# Patient Record
Sex: MALE | Race: BLACK OR AFRICAN AMERICAN | Employment: UNEMPLOYED | URBAN - METROPOLITAN AREA
[De-identification: names, ages, dates, MRNs, and addresses within clinical notes are randomized per-mention and may not be internally consistent; named-entity substitution may affect disease eponyms.]

---

## 2018-04-16 ENCOUNTER — HOSPITAL ENCOUNTER (EMERGENCY)
Age: 33
Discharge: HOME | End: 2018-04-16
Attending: EMERGENCY MEDICINE
Payer: COMMERCIAL

## 2018-04-16 VITALS
TEMPERATURE: 99 F | BODY MASS INDEX: 40.92 KG/M2 | RESPIRATION RATE: 18 BRPM | HEART RATE: 87 BPM | OXYGEN SATURATION: 99 % | HEIGHT: 68 IN | WEIGHT: 270 LBS

## 2018-04-16 DIAGNOSIS — R07.89 ATYPICAL CHEST PAIN: ICD-10-CM

## 2018-04-16 DIAGNOSIS — M54.50 ACUTE BILATERAL LOW BACK PAIN WITHOUT SCIATICA: Primary | ICD-10-CM
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
## 2018-04-16 PROCEDURE — 74011250637 HC RX REV CODE- 250/637: Performed by: PHYSICIAN ASSISTANT

## 2018-04-16 PROCEDURE — 99282 EMERGENCY DEPT VISIT SF MDM: CPT

## 2018-04-16 RX ORDER — METHOCARBAMOL 500 MG/1
1000 TABLET, FILM COATED ORAL ONCE
Status: COMPLETED | OUTPATIENT
Start: 2018-04-16 | End: 2018-04-16

## 2018-04-16 RX ORDER — METHOCARBAMOL 500 MG/1
1000 TABLET, FILM COATED ORAL 4 TIMES DAILY
Status: DISCONTINUED | OUTPATIENT
Start: 2018-04-16 | End: 2018-04-16

## 2018-04-16 RX ORDER — HYDROCODONE BITARTRATE AND ACETAMINOPHEN 5; 325 MG/1; MG/1
1 TABLET ORAL
Status: COMPLETED | OUTPATIENT
Start: 2018-04-16 | End: 2018-04-16

## 2018-04-16 RX ORDER — NAPROXEN 500 MG/1
500 TABLET ORAL 2 TIMES DAILY WITH MEALS
Qty: 20 TAB | Refills: 0 | Status: SHIPPED | OUTPATIENT
Start: 2018-04-16 | End: 2018-04-26

## 2018-04-16 RX ORDER — METAXALONE 800 MG/1
800 TABLET ORAL 4 TIMES DAILY
Qty: 20 TAB | Refills: 0 | Status: SHIPPED | OUTPATIENT
Start: 2018-04-16 | End: 2018-04-21

## 2018-04-16 RX ADMIN — HYDROCODONE BITARTRATE AND ACETAMINOPHEN 1 TABLET: 5; 325 TABLET ORAL at 10:20

## 2018-04-16 RX ADMIN — METHOCARBAMOL 1000 MG: 500 TABLET ORAL at 10:20
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
## 2018-04-16 NOTE — LETTER
[address] [place] [address] [facility] 
[address] [facility] [address] 
[phone] Work/School Note Date: 4/16/2018 To Whom It May concern: 
 
Nick Arce was seen and treated today in the emergency room by the following provider(s): 
Attending Provider: Fariha Royal MD 
Physician Assistant: Rohan Pool, [address] [place] [address] may return to work on 4/19/18 Sincerely, Rohan Pool [facility]
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
## 2018-04-18 ENCOUNTER — HOSPITAL ENCOUNTER (EMERGENCY)
Age: 33
Discharge: HOME | End: 2018-04-18
Attending: EMERGENCY MEDICINE
Payer: MEDICAID

## 2018-04-18 ENCOUNTER — APPOINTMENT (OUTPATIENT)
Dept: GENERAL RADIOLOGY | Age: 33
End: 2018-04-18
Attending: PHYSICIAN ASSISTANT
Payer: MEDICAID

## 2018-04-18 VITALS
RESPIRATION RATE: 18 BRPM | HEIGHT: 68 IN | SYSTOLIC BLOOD PRESSURE: 152 MMHG | WEIGHT: 270 LBS | BODY MASS INDEX: 40.92 KG/M2 | HEART RATE: 101 BPM | TEMPERATURE: 98.5 F | DIASTOLIC BLOOD PRESSURE: 90 MMHG | OXYGEN SATURATION: 99 %

## 2018-04-18 DIAGNOSIS — S39.012D LUMBAR STRAIN, SUBSEQUENT ENCOUNTER: Primary | ICD-10-CM

## 2018-04-18 PROCEDURE — 99282 EMERGENCY DEPT VISIT SF MDM: CPT

## 2018-04-18 PROCEDURE — 72100 X-RAY EXAM L-S SPINE 2/3 VWS: CPT
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
## 2018-04-19 NOTE — DISCHARGE INSTRUCTIONS
Back Strain: Care Instructions  Your Care Instructions    Back strain happens when you overstretch, or pull, a muscle in your back. You may hurt your back in an accident or when you exercise or lift something. Most back pain will get better with rest and time. You can take care of yourself at home to help your back heal.  Follow-up care is a key part of your treatment and safety. Be sure to make and go to all appointments, and call your doctor if you are having problems. It's also a good idea to know your test results and keep a list of the medicines you take. How can you care for yourself at home? · Try to stay as active as you can, but stop or reduce any activity that causes pain. · Put ice or a cold pack on the sore muscle for 10 to 20 minutes at a time to stop swelling. Try this every 1 to 2 hours for 3 days (when you are awake) or until the swelling goes down. Put a thin cloth between the ice pack and your skin. · After 2 or 3 days, apply a heating pad on low or a warm cloth to your back. Some doctors suggest that you go back and forth between hot and cold treatments. · Take pain medicines exactly as directed. ¨ If the doctor gave you a prescription medicine for pain, take it as prescribed. ¨ If you are not taking a prescription pain medicine, ask your doctor if you can take an over-the-counter medicine. · Try sleeping on your side with a pillow between your legs. Or put a pillow under your knees when you lie on your back. These measures can ease pain in your lower back. · Return to your usual level of activity slowly. When should you call for help? Call 911 anytime you think you may need emergency care. For example, call if:  ? · You are unable to move a leg at all. ?Call your doctor now or seek immediate medical care if:  ? · You have new or worse symptoms in your legs, belly, or buttocks. Symptoms may include:  ¨ Numbness or tingling. ¨ Weakness. ¨ Pain.    ? · You lose bladder or bowel control. ? Watch closely for changes in your health, and be sure to contact your doctor if you are not getting better as expected. Where can you learn more? Go to http://lisa-tabitha.info/. Enter Q560 in the search box to learn more about \"Back Strain: Care Instructions. \"  Current as of: March 21, 2017  Content Version: 11.4  © 2281-6282 [employer]. Care instructions adapted under license by [employer] (which disclaims liability or warranty for this information). If you have questions about a medical condition or this instruction, always ask your healthcare professional. [place] [address] any warranty or liability for your use of this information.
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
## 2018-04-20 ENCOUNTER — HOSPITAL ENCOUNTER (EMERGENCY)
Age: 33
Discharge: HOME | End: 2018-04-20
Attending: EMERGENCY MEDICINE
Payer: MEDICAID

## 2018-04-20 VITALS
DIASTOLIC BLOOD PRESSURE: 74 MMHG | WEIGHT: 280 LBS | TEMPERATURE: 98.5 F | SYSTOLIC BLOOD PRESSURE: 143 MMHG | BODY MASS INDEX: 42.44 KG/M2 | HEIGHT: 68 IN | OXYGEN SATURATION: 100 % | RESPIRATION RATE: 18 BRPM | HEART RATE: 105 BPM
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
## 2018-04-20 DIAGNOSIS — M54.50 ACUTE MIDLINE LOW BACK PAIN WITHOUT SCIATICA: ICD-10-CM

## 2018-04-20 PROCEDURE — 99282 EMERGENCY DEPT VISIT SF MDM: CPT

## 2018-04-20 RX ORDER — HYDROCODONE BITARTRATE AND ACETAMINOPHEN 5; 325 MG/1; MG/1
1 TABLET ORAL
Qty: 10 TAB | Refills: 0 | Status: SHIPPED | OUTPATIENT
Start: 2018-04-20 | End: 2018-06-05 | Stop reason: ALTCHOICE

## 2018-04-20 RX ORDER — LIDOCAINE 50 MG/G
PATCH TOPICAL
Qty: 15 EACH | Refills: 0 | Status: SHIPPED | OUTPATIENT
Start: 2018-04-20 | End: 2018-07-10

## 2018-04-20 NOTE — LETTER
NOTIFICATION OF RETURN TO WORK / SCHOOL 
 
4/20/2018 Mr. Lexy Delacruz [address] [facility] [address] [facility] [address] To Whom it may concern, Please excuse Lexy Delacruz from work 04/20/18 for medical reasons.    
 
 
Sincerely,  
 
 
 
 
Derrell Penn PA-C
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
## 2018-04-20 NOTE — DISCHARGE INSTRUCTIONS
Take anti-inflammatories such as tylenol or motrin with food for pain relief. Do not drive while taking narcotics or muscle relaxants. Massage the muscles that are tight and apply heating pads. Ice can help reduce swelling and inflammation. Apply in 20 minute intervals throughout the day. Return to ED for any significant worsening of pain, or neurologic changes such as loss of bowel or bladder control, weakness or numbness in the extremities. If this is a chronic issue, you may want to consider seeing a specialist or pain management physician. Back Stretches: Exercises  Your Care Instructions  Here are some examples of exercises for stretching your back. Start each exercise slowly. Ease off the exercise if you start to have pain. Your doctor or physical therapist will tell you when you can start these exercises and which ones will work best for you. How to do the exercises  Overhead stretch    1. Stand comfortably with your feet shoulder-width apart. 2. Looking straight ahead, raise both arms over your head and reach toward the ceiling. Do not allow your head to tilt back. 3. Hold for 15 to 30 seconds, then lower your arms to your sides. 4. Repeat 2 to 4 times. Side stretch    1. Stand comfortably with your feet shoulder-width apart. 2. Raise one arm over your head, and then lean to the other side. 3. Slide your hand down your leg as you let the weight of your arm gently stretch your side muscles. Hold for 15 to 30 seconds. 4. Repeat 2 to 4 times on each side. Press-up    1. Lie on your stomach, supporting your body with your forearms. 2. Press your elbows down into the floor to raise your upper back. As you do this, relax your stomach muscles and allow your back to arch without using your back muscles. As your press up, do not let your hips or pelvis come off the floor. 3. Hold for 15 to 30 seconds, then relax. 4. Repeat 2 to 4 times. Relax and rest    1.  Lie on your back with a rolled towel under your neck and a pillow under your knees. Extend your arms comfortably to your sides. 2. Relax and breathe normally. 3. Remain in this position for about 10 minutes. 4. If you can, do this 2 or 3 times each day. Follow-up care is a key part of your treatment and safety. Be sure to make and go to all appointments, and call your doctor if you are having problems. It's also a good idea to know your test results and keep a list of the medicines you take. Where can you learn more? Go to http://lisaAfrica Interactivetabitha.info/. Enter O535 in the search box to learn more about \"Back Stretches: Exercises. \"  Current as of: March 21, 2017  Content Version: 11.4  © 0656-0780 [employer]. Care instructions adapted under license by [employer] (which disclaims liability or warranty for this information). If you have questions about a medical condition or this instruction, always ask your healthcare professional. [place] [address] any warranty or liability for your use of this information. Learning About Relief for Back Pain  What is back tension and strain? Back strain happens when you overstretch, or pull, a muscle in your back. You may hurt your back in an accident or when you exercise or lift something. Most back pain will get better with rest and time. You can take care of yourself at home to help your back heal.  What can you do first to relieve back pain? When you first feel back pain, try these steps:  · Walk. Take a short walk (10 to 20 minutes) on a level surface (no slopes, hills, or stairs) every 2 to 3 hours. Walk only distances you can manage without pain, especially leg pain. · Relax. Find a comfortable position for rest. Some people are comfortable on the floor or a medium-firm bed with a small pillow under their head and another under their knees. Some people prefer to lie on their side with a pillow between their knees.  Don't stay in one position for too long. · Try heat or ice. Try using a heating pad on a low or medium setting, or take a warm shower, for 15 to 20 minutes every 2 to 3 hours. Or you can buy single-use heat wraps that last up to 8 hours. You can also try an ice pack for 10 to 15 minutes every 2 to 3 hours. You can use an ice pack or a bag of frozen vegetables wrapped in a thin towel. There is not strong evidence that either heat or ice will help, but you can try them to see if they help. You may also want to try switching between heat and cold. · Take pain medicine exactly as directed. ¨ If the doctor gave you a prescription medicine for pain, take it as prescribed. ¨ If you are not taking a prescription pain medicine, ask your doctor if you can take an over-the-counter medicine. What else can you do? · Stretch and exercise. Exercises that increase flexibility may relieve your pain and make it easier for your muscles to keep your spine in a good, neutral position. And don't forget to keep walking. · Do self-massage. You can use self-massage to unwind after work or school or to energize yourself in the morning. You can easily massage your feet, hands, or neck. Self-massage works best if you are in comfortable clothes and are sitting or lying in a comfortable position. Use oil or lotion to massage bare skin. · Reduce stress. Back pain can lead to a vicious Wiyot: Distress about the pain tenses the muscles in your back, which in turn causes more pain. Learn how to relax your mind and your muscles to lower your stress. Where can you learn more? Go to http://lisa-tabitha.info/. Enter E934 in the search box to learn more about \"Learning About Relief for Back Pain. \"  Current as of: March 21, 2017  Content Version: 11.4  © 1253-9717 [employer] [facility]. Care instructions adapted under license by [employer] (which disclaims liability or warranty for this information).  If you have questions about a medical condition or this instruction, always ask your healthcare professional. [place] [address] any warranty or liability for your use of this information.

## 2018-04-20 NOTE — ED NOTES
Danny Felton is a 34 y.o. male that was discharged in stable condition. The patients diagnosis, condition and treatment were explained to  patient and aftercare instructions were given. The patient verbalized understanding. Patient armband removed and shredded.
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
## 2018-05-14 ENCOUNTER — TELEPHONE (OUTPATIENT)
Dept: INTERNAL MEDICINE CLINIC | Age: 33
End: 2018-05-14

## 2018-05-14 NOTE — TELEPHONE ENCOUNTER
Patient wife andrei calling wanting to know if [employer] will do a prior auth for the patients Ethanuvia. Pt has appt to see JM on 5/21.     pls advise

## 2018-05-20 PROBLEM — M47.816 LUMBAR FACET ARTHROPATHY: Status: ACTIVE | Noted: 2018-05-20

## 2018-05-20 PROBLEM — E66.01 OBESITY, MORBID, BMI 40.0-49.9 (HCC): Status: ACTIVE | Noted: 2018-05-20

## 2018-06-05 ENCOUNTER — OFFICE VISIT (OUTPATIENT)
Dept: INTERNAL MEDICINE CLINIC | Age: 33
End: 2018-06-05

## 2018-06-05 ENCOUNTER — HOSPITAL ENCOUNTER (OUTPATIENT)
Dept: LAB | Age: 33
Discharge: HOME | End: 2018-06-05

## 2018-06-05 VITALS
SYSTOLIC BLOOD PRESSURE: 118 MMHG | OXYGEN SATURATION: 99 % | WEIGHT: 259.9 LBS | DIASTOLIC BLOOD PRESSURE: 88 MMHG | RESPIRATION RATE: 15 BRPM | HEIGHT: 68 IN | HEART RATE: 98 BPM | TEMPERATURE: 98.5 F | BODY MASS INDEX: 39.39 KG/M2

## 2018-06-05 DIAGNOSIS — R06.00 DYSPNEA, UNSPECIFIED TYPE: ICD-10-CM

## 2018-06-05 DIAGNOSIS — E66.01 OBESITY, MORBID, BMI 40.0-49.9 (HCC): ICD-10-CM

## 2018-06-05 DIAGNOSIS — F32.1 CURRENT MODERATE EPISODE OF MAJOR DEPRESSIVE DISORDER WITHOUT PRIOR EPISODE (HCC): ICD-10-CM

## 2018-06-05 DIAGNOSIS — Z76.89 ESTABLISHING CARE WITH NEW DOCTOR, ENCOUNTER FOR: Primary | ICD-10-CM

## 2018-06-05 DIAGNOSIS — E11.21 TYPE II DIABETES MELLITUS WITH NEPHROPATHY (HCC): ICD-10-CM

## 2018-06-05 DIAGNOSIS — G54.6 PHANTOM PAIN AFTER AMPUTATION OF LOWER EXTREMITY (HCC): ICD-10-CM

## 2018-06-05 PROCEDURE — 99001 SPECIMEN HANDLING PT-LAB: CPT | Performed by: NURSE PRACTITIONER

## 2018-06-05 RX ORDER — GABAPENTIN 300 MG/1
300 CAPSULE ORAL 3 TIMES DAILY
COMMUNITY
Start: 2018-05-23 | End: 2018-07-10 | Stop reason: ALTCHOICE

## 2018-06-05 RX ORDER — METOPROLOL TARTRATE 50 MG/1
50 TABLET ORAL 2 TIMES DAILY
COMMUNITY
Start: 2018-05-13 | End: 2018-06-19 | Stop reason: SDUPTHER

## 2018-06-05 RX ORDER — INSULIN LISPRO 100 [IU]/ML
2-10 INJECTION, SOLUTION INTRAVENOUS; SUBCUTANEOUS
COMMUNITY
Start: 2018-05-13 | End: 2018-06-05

## 2018-06-05 RX ORDER — INSULIN GLARGINE 100 [IU]/ML
30 INJECTION, SOLUTION SUBCUTANEOUS
COMMUNITY
Start: 2018-05-13 | End: 2018-07-10

## 2018-06-05 RX ORDER — LOSARTAN POTASSIUM 50 MG/1
50 TABLET ORAL DAILY
Qty: 90 TAB | Refills: 3 | Status: SHIPPED | OUTPATIENT
Start: 2018-06-05 | End: 2018-06-19 | Stop reason: SDUPTHER

## 2018-06-05 RX ORDER — OXYCODONE AND ACETAMINOPHEN 10; 325 MG/1; MG/1
TABLET ORAL
COMMUNITY
Start: 2018-05-23 | End: 2018-07-10

## 2018-06-05 RX ORDER — CLINDAMYCIN HYDROCHLORIDE 300 MG/1
300 CAPSULE ORAL 3 TIMES DAILY
COMMUNITY
Start: 2018-05-16 | End: 2018-06-05 | Stop reason: ALTCHOICE

## 2018-06-05 RX ORDER — LOSARTAN POTASSIUM 50 MG/1
50 TABLET ORAL DAILY
COMMUNITY
Start: 2018-05-14 | End: 2018-06-05 | Stop reason: SDUPTHER

## 2018-06-05 RX ORDER — ATORVASTATIN CALCIUM 20 MG/1
20 TABLET, FILM COATED ORAL
COMMUNITY
Start: 2018-05-13 | End: 2018-06-19 | Stop reason: SDUPTHER

## 2018-06-05 RX ORDER — PEN NEEDLE, DIABETIC 31 GX3/16"
1 NEEDLE, DISPOSABLE MISCELLANEOUS
COMMUNITY
Start: 2018-05-13 | End: 2018-07-10 | Stop reason: ALTCHOICE

## 2018-06-05 NOTE — MR AVS SNAPSHOT
Becky Barrman 
 
 
 [address], [address] [facility] 
[phone] Patient: Mike Eden MRN: [id number] Visit Information Date & Time Provider Department Dept. Phone Encounter #  
 6/5/2018 12:45 PM Kedar Knutson NP [facility] [phone] Your Appointments 6/19/2018  9:30 AM  
Office Visit with Kedar Knutson NP [facility] ([address]) Appt Note: 2 week f/u  
 [address] [facility], [address] [facility]  
  
   
 [address], [address]  
  
    
 6/19/2018 10:00 AM  
Office Visit with Mo Tubbs [facility] ([address]) Appt Note: 2 week f/u  
 [address] [facility], [address] [facility] [address] [place]  
  
   
 [address], [address] Upcoming Health Maintenance Date Due DTaP/Tdap/Td series (1 - Tdap) 8/31/2005 Influenza Age 5 to Adult 8/1/2018 Allergies as of 6/5/2018  Review Complete On: 6/5/2018 By: Ruba Diaz LPN No Known Allergies Current Immunizations  Never Reviewed No immunizations on file. Not reviewed this visit You Were Diagnosed With   
  
 Codes Comments Establishing care with new doctor, encounter for    -  Primary ICD-10-CM: Z76.89 
ICD-9-CM: V65.8 Obesity, morbid, BMI 40.0-49.9 (HCC)     ICD-10-CM: E66.01 
ICD-9-CM: 278.01 Dyspnea, unspecified type     ICD-10-CM: R06.00 
ICD-9-CM: 786.09 Type II diabetes mellitus with nephropathy (HCC)     ICD-10-CM: E11.21 
ICD-9-CM: 250.40, 583.81 Phantom pain after amputation of lower extremity ([facility] [address]     ICD-10-CM: G54.6 ICD-9-CM: 353.6 Current moderate episode of major depressive disorder without prior episode ([facility] [address]     ICD-10-CM: F32.1 ICD-9-CM: 296.22 Vitals BP Pulse Temp Resp Height(growth percentile) Weight(growth percentile) 118/88 (BP 1 Location: Right arm, BP Patient Position: Sitting) 98 98.5 °F (36.9 °C) (Oral) 15 5' 8\" (1.727 m) 259 lb 14.4 oz (117.9 kg) SpO2 BMI Smoking Status 99% 39.52 kg/m2 Never Smoker Vitals History BMI and BSA Data Body Mass Index Body Surface Area  
 39.52 kg/m 2 2.38 m 2 Preferred Pharmacy Pharmacy Name Phone [address] , [address] [place] [address] [phone] Your Updated Medication List  
  
   
This list is accurate as of 6/5/18  2:19 PM.  Always use your most recent med list.  
  
  
  
  
 atorvastatin 20 mg tablet Commonly known as:  LIPITOR 20 mg.  
  
 gabapentin 300 mg capsule Commonly known as:  NEURONTIN Take 300 mg by mouth three (3) times daily. insulin glargine 100 unit/mL injection Commonly known as:  LANTUS  
30 Units by SubCUTAneous route Before breakfast, lunch, dinner and at bedtime. Insulin Needles (Disposable) 32 gauge x 5/32\" Ndle  
1 Units. lidocaine 5 % Commonly known as:  Gilford Crews Apply patch to the affected area for 12 hours a day and remove for 12 hours a day. losartan 50 mg tablet Commonly known as:  COZAAR Take 1 Tab by mouth daily. metoprolol tartrate 50 mg tablet Commonly known as:  LOPRESSOR Take 50 mg by mouth two (2) times a day. oxyCODONE-acetaminophen  mg per tablet Commonly known as:  PERCOCET 10 Take 1 Tab by Mouth Every 4 Hours As Needed. Prescriptions Sent to Pharmacy Refills  
 losartan (COZAAR) 50 mg tablet 3 Sig: Take 1 Tab by mouth daily. Class: Normal  
 Pharmacy: [address] [place] [address], [address] [facility] #: [phone] Route: Oral  
  
We Performed the Following AMB POC EKG ROUTINE W/ 12 LEADS, INTER & REP [56546 CPT(R)] C-PEPTIDE [id number] CPT(R)] CBC WITH AUTOMATED DIFF [19340 CPT(R)] GLUTAMIC ACID DECARB AB [66294 CPT(R)] HEMOGLOBIN A1C WITH EAG [77072 CPT(R)]   
 IA-2 AUTO-ANTIBODY [[id number] Custom] METABOLIC PANEL, COMPREHENSIVE [21387 CPT(R)] MICROALBUMIN, UR, RAND W/ MICROALB/CREAT RATIO [id number] CPT(R)] NT-PRO BNP [id number] CPT(R)] T4, FREE [id number] CPT(R)] TSH 3RD GENERATION [88236 CPT(R)] To-Do List   
 06/05/2018 ECHO:  2D ECHO COMPLETE ADULT (TTE) W OR WO CONTR   
  
 06/05/2018 Lab:  C-PEPTIDE   
  
 06/05/2018 Lab:  CBC WITH AUTOMATED DIFF   
  
 06/05/2018 Lab:  GLUTAMIC ACID DECARB AB   
  
 06/05/2018 Lab:  HEMOGLOBIN A1C WITH EAG   
  
 06/05/2018 Lab:  IA-2 AUTO-ANTIBODY   
  
 06/05/2018 Lab:  METABOLIC PANEL, COMPREHENSIVE   
  
 06/05/2018 Lab:  MICROALBUMIN, UR, RAND W/ MICROALB/CREAT RATIO   
  
 06/05/2018 Lab:  NT-PRO BNP   
  
 06/05/2018 Lab:  T4, FREE   
  
 06/05/2018 Lab:  TSH 3RD GENERATION Patient Instructions Advance Directives: Care Instructions Your Care Instructions An advance directive is a legal way to state your wishes at the end of your life. It tells your family and your doctor what to do if you can no longer say what you want. There are two main types of advance directives. You can change them any time that your wishes change. · A living will tells your family and your doctor your wishes about life support and other treatment. · A durable power of  for health care lets you name a person to make treatment decisions for you when you can't speak for yourself. This person is called a health care agent. If you do not have an advance directive, decisions about your medical care may be made by a doctor or a  who doesn't know you. It may help to think of an advance directive as a gift to the people who care for you. If you have one, they won't have to make tough decisions by themselves. Follow-up care is a key part of your treatment and safety. Be sure to make and go to all appointments, and call your doctor if you are having problems. It's also a good idea to know your test results and keep a list of the medicines you take. How can you care for yourself at home? · Discuss your wishes with your loved ones and your doctor. This way, there are no surprises. · Many states have a unique form. Or you might use a universal form that has been approved by many states. This kind of form can sometimes be completed and stored online. Your electronic copy will then be available wherever you have a connection to the Internet. In most cases, doctors will respect your wishes even if you have a form from a different state. · You don't need a  to do an advance directive. But you may want to get legal advice. · Think about these questions when you prepare an advance directive: ¨ Who do you want to make decisions about your medical care if you are not able to? Many people choose a family member or close friend. ¨ Do you know enough about life support methods that might be used? If not, talk to your doctor so you understand. ¨ What are you most afraid of that might happen? You might be afraid of having pain, losing your independence, or being kept alive by machines. ¨ Where would you prefer to die? Choices include your home, a hospital, or a nursing home. ¨ Would you like to have information about hospice care to support you and your family? ¨ Do you want to donate organs when you die? ¨ Do you want certain Scientologist practices performed before you die? If so, put your wishes in the advance directive. · Read your advance directive every year, and make changes as needed. When should you call for help? Be sure to contact your doctor if you have any questions. Where can you learn more? Go to http://lisa-tabitha.info/. Enter R264 in the search box to learn more about \"Advance Directives: Care Instructions. \" Current as of: September 24, 2016 Content Version: 11.4 © 5575-6880 [employer], [employer]. Care instructions adapted under license by [employer] (which disclaims liability or warranty for this information). If you have questions about a medical condition or this instruction, always ask your healthcare professional. [address] any warranty or liability for your use of this information. Introducing [facility]! [facility] introduces [employer] patient portal. Now you can access parts of your medical record, email your doctor's office, and request medication refills online. 1. In your internet browser, go to [email or web address] 2. Click on the First Time User? Click Here link in the Sign In box. You will see the New Member Sign Up page. 3. Enter your [employer] Access Code exactly as it appears below. You will not need to use this code after youve completed the sign-up process. If you do not sign up before the expiration date, you must request a new code. · [employer] Access Code: [email or web address] Expires: 7/15/2018  9:10 AM 
 
4. Enter the last four digits of your Social Security Number (xxxx) and Date of Birth (mm/dd/yyyy) as indicated and click Submit. You will be taken to the next sign-up page. 5. Create a [employer] ID. This will be your [employer] login ID and cannot be changed, so think of one that is secure and easy to remember. 6. Create a [employer] password. You can change your password at any time. 7. Enter your Password Reset Question and Answer. This can be used at a later time if you forget your password. 8. Enter your e-mail address. You will receive e-mail notification when new information is available in [address] 9. Click Sign Up. You can now view and download portions of your medical record. 10. Click the Download Summary menu link to download a portable copy of your medical information. If you have questions, please visit the Frequently Asked Questions section of the [employer] website. Remember, [employer] is NOT to be used for urgent needs. For medical emergencies, dial 911. Now available from your iPhone and Android! Please provide this summary of care documentation to your next provider. Your primary care clinician is listed as Nkechi Carlton. If you have any questions after today's visit, please call [phone].
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
## 2018-06-05 NOTE — PATIENT INSTRUCTIONS
Advance Directives: Care Instructions  Your Care Instructions  An advance directive is a legal way to state your wishes at the end of your life. It tells your family and your doctor what to do if you can no longer say what you want. There are two main types of advance directives. You can change them any time that your wishes change. · A living will tells your family and your doctor your wishes about life support and other treatment. · A durable power of  for health care lets you name a person to make treatment decisions for you when you can't speak for yourself. This person is called a health care agent. If you do not have an advance directive, decisions about your medical care may be made by a doctor or a  who doesn't know you. It may help to think of an advance directive as a gift to the people who care for you. If you have one, they won't have to make tough decisions by themselves. Follow-up care is a key part of your treatment and safety. Be sure to make and go to all appointments, and call your doctor if you are having problems. It's also a good idea to know your test results and keep a list of the medicines you take. How can you care for yourself at home? · Discuss your wishes with your loved ones and your doctor. This way, there are no surprises. · Many states have a unique form. Or you might use a universal form that has been approved by many states. This kind of form can sometimes be completed and stored online. Your electronic copy will then be available wherever you have a connection to the Internet. In most cases, doctors will respect your wishes even if you have a form from a different state. · You don't need a  to do an advance directive. But you may want to get legal advice. · Think about these questions when you prepare an advance directive:  ¨ Who do you want to make decisions about your medical care if you are not able to?  Many people choose a family member or close friend. ¨ Do you know enough about life support methods that might be used? If not, talk to your doctor so you understand. ¨ What are you most afraid of that might happen? You might be afraid of having pain, losing your independence, or being kept alive by machines. ¨ Where would you prefer to die? Choices include your home, a hospital, or a nursing home. ¨ Would you like to have information about hospice care to support you and your family? ¨ Do you want to donate organs when you die? ¨ Do you want certain Mandaen practices performed before you die? If so, put your wishes in the advance directive. · Read your advance directive every year, and make changes as needed. When should you call for help? Be sure to contact your doctor if you have any questions. Where can you learn more? Go to http://lisa-tabitha.info/. Enter R264 in the search box to learn more about \"Advance Directives: Care Instructions. \"  Current as of: September 24, 2016  Content Version: 11.4  © 0226-5542 [employer]. Care instructions adapted under license by [employer] (which disclaims liability or warranty for this information). If you have questions about a medical condition or this instruction, always ask your healthcare professional. [address] any warranty or liability for your use of this information.

## 2018-06-05 NOTE — PROGRESS NOTES
1. Have you been to the ER, urgent care clinic or hospitalized since your last visit? YES. [facility]    2. Have you seen or consulted any other health care providers outside of the [address] [place] [address] since your last visit (Include any pap smears or colon screening)? YES  Dr Yamile Noyola [facility] podiatrist     Do you have an Advanced Directive? NO    Would you like information on Advanced Directives?  YES

## 2018-06-05 NOTE — LETTER
NOTIFICATION RETURN TO WORK / SCHOOL 
 
6/5/2018 2:05 PM 
 
Mr. Gigi Solano [address] [facility] [address] [facility] [address] To Whom It May Concern: 
 
Gigi Solano is currently under the care of Alan Marcos. He presents today with his wife and she is his form of transportation at this time. If there are questions or concerns please have the patient contact our office. Sincerely, Janelle Chew NP

## 2018-06-06 ENCOUNTER — TELEPHONE (OUTPATIENT)
Dept: INTERNAL MEDICINE CLINIC | Age: 33
End: 2018-06-06

## 2018-06-06 NOTE — TELEPHONE ENCOUNTER
Pt's spouse called, said pt told her he had been referred to get a \"heart\" test and wasn't able to give her information about why he needed the test and couldn't explain to her what he found out at this appt about his health in general and she wanted clarification about what was discussed with pt. I told her I wasn't able to give her any information because pt had not named anyone as a patient rep. Told her to speak with pt and have him come to the office to fill out a form authorizing us to speak with her or he could do it at a follow-up appt.

## 2018-06-10 LAB
ALBUMIN SERPL-MCNC: 4.2 G/DL (ref 3.5–5.5)
ALBUMIN/CREAT UR: 47.6 MG/G CREAT (ref 0–30)
ALBUMIN/GLOB SERPL: 1.2 {RATIO} (ref 1.2–2.2)
ALP SERPL-CCNC: 62 IU/L (ref 39–117)
ALT SERPL-CCNC: 20 IU/L (ref 0–44)
AST SERPL-CCNC: 15 IU/L (ref 0–40)
BASOPHILS # BLD AUTO: 0 X10E3/UL (ref 0–0.2)
BASOPHILS NFR BLD AUTO: 0 %
BILIRUB SERPL-MCNC: 0.3 MG/DL (ref 0–1.2)
BUN SERPL-MCNC: 22 MG/DL (ref 6–20)
BUN/CREAT SERPL: 25 (ref 9–20)
C PEPTIDE SERPL-MCNC: 2.3 NG/ML (ref 1.1–4.4)
CALCIUM SERPL-MCNC: 9.5 MG/DL (ref 8.7–10.2)
CHLORIDE SERPL-SCNC: 102 MMOL/L (ref 96–106)
CO2 SERPL-SCNC: 22 MMOL/L (ref 18–29)
CREAT SERPL-MCNC: 0.87 MG/DL (ref 0.76–1.27)
CREAT UR-MCNC: 56.3 MG/DL
EOSINOPHIL # BLD AUTO: 0.5 X10E3/UL (ref 0–0.4)
EOSINOPHIL NFR BLD AUTO: 5 %
ERYTHROCYTE [DISTWIDTH] IN BLOOD BY AUTOMATED COUNT: 14.4 % (ref 12.3–15.4)
EST. AVERAGE GLUCOSE BLD GHB EST-MCNC: 197 MG/DL
GAD65 AB SER IA-ACNC: <5 U/ML (ref 0–5)
GFR SERPLBLD CREATININE-BSD FMLA CKD-EPI: 113 ML/MIN/1.73
GFR SERPLBLD CREATININE-BSD FMLA CKD-EPI: 131 ML/MIN/1.73
GLOBULIN SER CALC-MCNC: 3.4 G/DL (ref 1.5–4.5)
GLUCOSE SERPL-MCNC: 167 MG/DL (ref 65–99)
HBA1C MFR BLD: 8.5 % (ref 4.8–5.6)
HCT VFR BLD AUTO: 31.7 % (ref 37.5–51)
HGB BLD-MCNC: 10.3 G/DL (ref 13–17.7)
IMM GRANULOCYTES # BLD: 0 X10E3/UL (ref 0–0.1)
IMM GRANULOCYTES NFR BLD: 0 %
ISLET CELL512 AB SER-ACNC: <1 U/ML
LYMPHOCYTES # BLD AUTO: 3.8 X10E3/UL (ref 0.7–3.1)
LYMPHOCYTES NFR BLD AUTO: 38 %
MCH RBC QN AUTO: 26.3 PG (ref 26.6–33)
MCHC RBC AUTO-ENTMCNC: 32.5 G/DL (ref 31.5–35.7)
MCV RBC AUTO: 81 FL (ref 79–97)
MICROALBUMIN UR-MCNC: 26.8 UG/ML
MONOCYTES # BLD AUTO: 0.4 X10E3/UL (ref 0.1–0.9)
MONOCYTES NFR BLD AUTO: 4 %
NEUTROPHILS # BLD AUTO: 5.3 X10E3/UL (ref 1.4–7)
NEUTROPHILS NFR BLD AUTO: 53 %
NT-PROBNP SERPL-MCNC: 225 PG/ML (ref 0–86)
PLATELET # BLD AUTO: 322 X10E3/UL (ref 150–379)
POTASSIUM SERPL-SCNC: 4.1 MMOL/L (ref 3.5–5.2)
PROT SERPL-MCNC: 7.6 G/DL (ref 6–8.5)
RBC # BLD AUTO: 3.91 X10E6/UL (ref 4.14–5.8)
SODIUM SERPL-SCNC: 141 MMOL/L (ref 134–144)
T4 FREE SERPL-MCNC: 1.36 NG/DL (ref 0.82–1.77)
TSH SERPL DL<=0.005 MIU/L-ACNC: 1.42 UIU/ML (ref 0.45–4.5)
WBC # BLD AUTO: 10 X10E3/UL (ref 3.4–10.8)

## 2018-06-11 ENCOUNTER — TELEPHONE (OUTPATIENT)
Dept: INTERNAL MEDICINE CLINIC | Age: 33
End: 2018-06-11

## 2018-06-11 DIAGNOSIS — R79.89 ELEVATED BRAIN NATRIURETIC PEPTIDE (BNP) LEVEL: Primary | ICD-10-CM

## 2018-06-11 RX ORDER — METFORMIN HYDROCHLORIDE 500 MG/1
500 TABLET ORAL 2 TIMES DAILY WITH MEALS
Qty: 60 TAB | Refills: 1 | Status: SHIPPED | OUTPATIENT
Start: 2018-06-11 | End: 2018-06-19

## 2018-06-11 NOTE — TELEPHONE ENCOUNTER
Please contact Mr. Dyanna Homans and ask how his shortness of breath has been, is it increasing, using more pillows to sleep, sleeping sitting up? His blood work did show elevation in the proteins put off my the heart muscle stating he is in congestive heart failure. Did he schedule to the ECHO completed? I am going to refer to cardiology today. Also I note a decrease in his hgb/hct level from previous levels. Has he noted an increase in blood loss with wound vac? Other labs show no signs of autoimmune disease- type 1 DM. A1c level is the marker for dm and it is at 8.5. Kidney function is good so going to send in Metformin to start taking by mouth. Metformin 500 mg to be taken twice a day, we will increase if no problems in the future, can have side effects of diarrhea that after about 2 weeks usually resolves but please monitor and report.

## 2018-06-14 ENCOUNTER — HOSPITAL ENCOUNTER (OUTPATIENT)
Dept: NON INVASIVE DIAGNOSTICS | Age: 33
Discharge: HOME | End: 2018-06-14
Attending: NURSE PRACTITIONER
Payer: COMMERCIAL

## 2018-06-14 ENCOUNTER — TELEPHONE (OUTPATIENT)
Dept: INTERNAL MEDICINE CLINIC | Age: 33
End: 2018-06-14

## 2018-06-14 DIAGNOSIS — E11.21 TYPE II DIABETES MELLITUS WITH NEPHROPATHY (HCC): ICD-10-CM

## 2018-06-14 DIAGNOSIS — R06.00 DYSPNEA, UNSPECIFIED TYPE: ICD-10-CM

## 2018-06-14 PROCEDURE — 93306 TTE W/DOPPLER COMPLETE: CPT

## 2018-06-14 RX ORDER — LANOLIN ALCOHOL/MO/W.PET/CERES
325 CREAM (GRAM) TOPICAL
Qty: 90 TAB | Refills: 2 | Status: SHIPPED | OUTPATIENT
Start: 2018-06-14 | End: 2018-09-06 | Stop reason: ALTCHOICE

## 2018-06-14 NOTE — TELEPHONE ENCOUNTER
Please inform the patient that I would like him to start taking iron tablet that I sent in daily, can possibly cause constipation so make sure water intake is adequate. And I will recheck labs in future. Also I think it is worth being seen for sleep apnea and possible work up for asthma since the shortness of breath is at night time only, I was going to give you more time since you are healing but I feel sooner work up is warranted.

## 2018-06-14 NOTE — TELEPHONE ENCOUNTER
Pt called to spk to SL to inq about whether he should be taking the insulin and the metformin both, pls adv

## 2018-06-14 NOTE — TELEPHONE ENCOUNTER
Pt aware of message below and verbalized understanding. Pt stated he still experiencing some shortness of breath at night and when using restroom. He is using pillows at night. Pt stated he has seen a minimum amount of drainage in wound vac but has not noticed any blood. Provided pt with phone number to contact central scheduling to set up appointment for ECHO.

## 2018-06-19 ENCOUNTER — HOSPITAL ENCOUNTER (OUTPATIENT)
Dept: LAB | Age: 33
Discharge: HOME | End: 2018-06-19

## 2018-06-19 ENCOUNTER — OFFICE VISIT (OUTPATIENT)
Dept: INTERNAL MEDICINE CLINIC | Age: 33
End: 2018-06-19

## 2018-06-19 ENCOUNTER — TELEPHONE (OUTPATIENT)
Dept: INTERNAL MEDICINE CLINIC | Age: 33
End: 2018-06-19

## 2018-06-19 VITALS
BODY MASS INDEX: 38.37 KG/M2 | OXYGEN SATURATION: 99 % | RESPIRATION RATE: 16 BRPM | HEIGHT: 68 IN | HEART RATE: 77 BPM | WEIGHT: 253.2 LBS | DIASTOLIC BLOOD PRESSURE: 78 MMHG | TEMPERATURE: 98.2 F | SYSTOLIC BLOOD PRESSURE: 118 MMHG

## 2018-06-19 DIAGNOSIS — E11.8 TYPE 2 DIABETES MELLITUS WITH COMPLICATION, WITHOUT LONG-TERM CURRENT USE OF INSULIN (HCC): ICD-10-CM

## 2018-06-19 DIAGNOSIS — D64.9 LOW HEMOGLOBIN: Primary | ICD-10-CM

## 2018-06-19 DIAGNOSIS — E11.21 TYPE 2 DIABETES WITH NEPHROPATHY (HCC): Primary | ICD-10-CM

## 2018-06-19 PROBLEM — E11.9 CONTROLLED TYPE 2 DIABETES MELLITUS, WITHOUT LONG-TERM CURRENT USE OF INSULIN (HCC): Status: ACTIVE | Noted: 2018-06-19

## 2018-06-19 PROCEDURE — 99001 SPECIMEN HANDLING PT-LAB: CPT | Performed by: NURSE PRACTITIONER

## 2018-06-19 RX ORDER — ATORVASTATIN CALCIUM 20 MG/1
20 TABLET, FILM COATED ORAL DAILY
Qty: 90 TAB | Refills: 3 | Status: SHIPPED | OUTPATIENT
Start: 2018-06-19 | End: 2018-07-10 | Stop reason: SDUPTHER

## 2018-06-19 RX ORDER — METFORMIN HYDROCHLORIDE 1000 MG/1
1000 TABLET ORAL 2 TIMES DAILY WITH MEALS
Qty: 60 TAB | Refills: 4 | Status: SHIPPED | OUTPATIENT
Start: 2018-06-19 | End: 2018-11-08 | Stop reason: SDUPTHER

## 2018-06-19 RX ORDER — METOPROLOL TARTRATE 50 MG/1
50 TABLET ORAL 2 TIMES DAILY
Qty: 90 TAB | Refills: 3 | Status: SHIPPED | OUTPATIENT
Start: 2018-06-19 | End: 2018-07-10 | Stop reason: SDUPTHER

## 2018-06-19 RX ORDER — LOSARTAN POTASSIUM 50 MG/1
50 TABLET ORAL DAILY
Qty: 90 TAB | Refills: 3 | Status: SHIPPED | OUTPATIENT
Start: 2018-06-19 | End: 2018-07-10 | Stop reason: SDUPTHER
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
## 2018-06-19 NOTE — TELEPHONE ENCOUNTER
Called the patient to notify of appointment for the referrals that [facility] AND [facility] place. First Cardiology appointment on 08/08/2018 at 9:00am.  Patient to arrive at 8:45am with Dr. Jose Villalba at [address] [facility]. [address]. [place], [address] Telephone: [phone]. Pulmonary appointment on 07/10/2018 at 10:20am.  Patient will be mailed new patient paperwork. Patient to arrive at 9:50am to verify all new patient paperwork was filled out correctly. If the patient doesn't fill out paperwork patient must arrive 1 hour early. Address: [address] [place], [address] Telephone: [phone]. Patient verbalized understanding. Patient wanted to know when he should return to see [facility], NP.   Spoke with Tao Doty and schedule appointment on 07/10/2018 at 1:30pm and also a follow up appointment with [facility] on that day as well at 2:00pm

## 2018-06-19 NOTE — PROGRESS NOTES
1. Have you been to the ER, urgent care clinic or hospitalized since your last visit? NO.     2. Have you seen or consulted any other health care providers outside of the [address] [place] [address] since your last visit (Include any pap smears or colon screening)? NO      Do you have an Advanced Directive? NO    Would you like information on Advanced Directives?  NO

## 2018-06-19 NOTE — PROGRESS NOTES
Pharmacy Note - Diabetes   06/19/18       Patient name: Nina Conrad (18 y.o., male)  YOB: 1985    Referred by: Dr. Naun Weber NP for diabetes education / management   No past medical history on file. Allergies:   No Known Allergies  Subjective / Objective      Medications:   Current medications for diabetes include:      Key Antihyperglycemic Medications             metFORMIN (GLUCOPHAGE) 500 mg tablet  Take 1 Tab by mouth two (2) times daily (with meals). Indications: type 2 diabetes mellitus    insulin glargine (LANTUS) 100 unit/mL injection 30 Units by SubCUTAneous route Before breakfast, lunch, dinner and at bedtime. Previous medications used for diabetes management include:   [] metformin [] SGLT-2 Inhibitors   [] sulfonylureas [] TZDs   [] DPP-IV inhibitors [] insulin   [] GLP-1 agonists  [x] none     Patient denies adherence with his medications. Blood Glucose Findings:   He checks his blood glucose readings 3 times daily. Patient did not bring his blood sugar log to today's visit. - fasting/pre-prandial range: reported less 130 for most occasions, lowest was 103      - postprandial range: highest reported was 160's after a meal    Hypoglycemic episodes: No    His last A1c value(s) noted to be:      Lab Results   Component Value Date/Time    Hemoglobin A1c 8.5 (H) 06/05/2018 01:57 PM       Assessment / Plan      1. Diabetes:  Goal A1C: less than 7%. Patient's most recent A1c is not at their current goal. During the visit today reviewed with patient: self-monitoring blood glucose fasting/post-prandial goals, importance of blood glucose control in avoidance of diabetic complications or further progression if already present, signs/symptoms/management of hypoglycemia, impact of exercise on glucose control, and diet and health maintenance items explained below.  He noted that he had stopped taking the Lantus the day after starting the metformin (roughly 1 week ago). Per the account, numbers have not gone higher but they did not bring a log with them. Metformin was increased to 1000 mg twice daily by PCP today. Had an open discussion about further treatment options including self adjustment of Lantus dosing based on fasting numbers or taking a wait and see approach in addition to working on the dietary measures covered below. Patient found this a difficult choice and advised him that tighter control would help his wound heal the fastest. Decided on having patient take metformin increased dose as above and having him come back in 3 weeks with a blood sugar log to determine averages and see what needs to be adjusted. He will remain off the Lantus at this time. Advised patient that he will likely need another medication (A1c at [facility] was noted at 10.5) but that we could likely provide him with another pill as he was somewhat resistant to daily injectable insulin. Will continue to follow with PCP and determine most appropriate steps for treatment. 2. Diet/lifestyle:  Reviewed with patient utilization of the plate method as a way to encourage healthy eating. Reviewed carbohydrate and non-carbohydrate rich foods, carbohydrate content of various foods, appropriate serving sizes for carbohydrates (15 grams = 1 carb serving), reading the nutrition label focusing on total carbohydrates while being mindful of serving size, recommended serving sizes for carbohydrates for meals and snacks (45-60g with meals and less than or equal to 15g for snacks ), and discussion regarding fruits as a carbohydrate. Patient education materials were provided and reviewed with the patient for their future reference and use. He has already begun to work on diet and was unsure about what foods he could eat.  Emphasized portion control would be key for patient to maintain roughly 45 grams of carbohydrates per meal.         Patient verbalized understanding of the information presented and all of the patients questions were answered. AVS was handed to the patient and information reviewed. Patient advised to call the office with any additional questions or concerns. Notification of recommendations will be sent to Ilsa Kraft NP for review.     Future Appointments  Date Time Provider Ana Angelia   7/10/2018 1:30 PM Ilsa Kraft NP [facility]   7/10/2018 2:00 PM [place] [id number]   8/8/2018 9:00 AM Prieto Last MD [address] [place] [address]       Thank you for the consult,  Lissa Shields, PharmD, BCPS, BCACP, BC-ADM

## 2018-06-20 LAB
HCT VFR BLD AUTO: 31.5 % (ref 37.5–51)
HGB BLD-MCNC: 10.4 G/DL (ref 13–17.7)

## 2018-06-20 NOTE — PROGRESS NOTES
Please inform the patient that the labs are stable, about the same as previous will check in a few months or if symptoms progress but for now no further labs or intervention.

## 2018-06-21 ENCOUNTER — TELEPHONE (OUTPATIENT)
Dept: INTERNAL MEDICINE CLINIC | Age: 33
End: 2018-06-21

## 2018-06-21 NOTE — TELEPHONE ENCOUNTER
----- Message from Cameron Mahajan NP sent at 6/20/2018  4:15 PM EDT -----  Please inform the patient that the labs are stable, about the same as previous will check in a few months or if symptoms progress but for now no further labs or intervention.

## 2018-06-29 ENCOUNTER — TELEPHONE (OUTPATIENT)
Dept: INTERNAL MEDICINE CLINIC | Age: 33
End: 2018-06-29

## 2018-06-29 NOTE — TELEPHONE ENCOUNTER
Almost out of his metformin. He wants to talk to Rozina Cifuentes about increasing the mg. He request a call back please.

## 2018-06-29 NOTE — TELEPHONE ENCOUNTER
Spoke with patient wife she is aware of message below and verbalized understanding. No further questions or concerns from pt at this time.

## 2018-06-29 NOTE — TELEPHONE ENCOUNTER
Please inform the patient that I did increase the Metformin at the last office visit to highest dose. He is supposed to be taking Metformin 1000 mg twice a day, at breakfast and dinner. I also called in a script for 60 tablets with 4 refills. Please have him contact his pharmacy.

## 2018-07-10 ENCOUNTER — OFFICE VISIT (OUTPATIENT)
Dept: INTERNAL MEDICINE CLINIC | Age: 33
End: 2018-07-10

## 2018-07-10 ENCOUNTER — HOSPITAL ENCOUNTER (OUTPATIENT)
Dept: LAB | Age: 33
Discharge: HOME | End: 2018-07-10

## 2018-07-10 VITALS
TEMPERATURE: 99.1 F | WEIGHT: 252.5 LBS | HEIGHT: 68 IN | OXYGEN SATURATION: 99 % | SYSTOLIC BLOOD PRESSURE: 126 MMHG | HEART RATE: 95 BPM | BODY MASS INDEX: 38.27 KG/M2 | RESPIRATION RATE: 14 BRPM | DIASTOLIC BLOOD PRESSURE: 84 MMHG

## 2018-07-10 DIAGNOSIS — D64.9 ANEMIA, UNSPECIFIED TYPE: ICD-10-CM

## 2018-07-10 DIAGNOSIS — F06.31 DEPRESSION DUE TO PHYSICAL ILLNESS: ICD-10-CM

## 2018-07-10 DIAGNOSIS — E11.21 TYPE 2 DIABETES WITH NEPHROPATHY (HCC): Primary | ICD-10-CM

## 2018-07-10 DIAGNOSIS — D64.9 ANEMIA, UNSPECIFIED TYPE: Primary | ICD-10-CM

## 2018-07-10 DIAGNOSIS — E11.21 TYPE 2 DIABETES WITH NEPHROPATHY (HCC): ICD-10-CM

## 2018-07-10 PROCEDURE — 99001 SPECIMEN HANDLING PT-LAB: CPT | Performed by: NURSE PRACTITIONER

## 2018-07-10 RX ORDER — LOSARTAN POTASSIUM 50 MG/1
50 TABLET ORAL DAILY
Qty: 90 TAB | Refills: 3 | Status: SHIPPED | OUTPATIENT
Start: 2018-07-10 | End: 2018-10-08 | Stop reason: DRUGHIGH

## 2018-07-10 RX ORDER — ATORVASTATIN CALCIUM 20 MG/1
20 TABLET, FILM COATED ORAL DAILY
Qty: 90 TAB | Refills: 3 | Status: SHIPPED | OUTPATIENT
Start: 2018-07-10 | End: 2018-09-07 | Stop reason: SDUPTHER

## 2018-07-10 RX ORDER — METOPROLOL TARTRATE 50 MG/1
50 TABLET ORAL 2 TIMES DAILY
Qty: 90 TAB | Refills: 3 | Status: SHIPPED | OUTPATIENT
Start: 2018-07-10 | End: 2018-12-14 | Stop reason: SDUPTHER

## 2018-07-10 NOTE — PROGRESS NOTES
Pharmacy Note - Diabetes   07/10/18       Patient name: Souleymane Scanlon (62 y.o., male)  YOB: 1985    Referred by: Elham Lynn NP for diabetes education / management   No past medical history on file. Allergies:   No Known Allergies  Subjective / Objective      Medications:   Current medications for diabetes include:      Key Antihyperglycemic Medications             metFORMIN (GLUCOPHAGE) 1,000 mg tablet Take 1 Tab by mouth two (2) times daily (with meals). Indications: type 2 diabetes mellitus        Patient reports adherence with his diabetes medications. Blood Glucose Findings:   He checks his blood glucose readings 1 -2 times daily. Patient did not bring his blood sugar log to today's visit. States that the lowest reading he has seen was 103 (fasting) and highest reading has been 130 (post-prandial)     Hypoglycemic episodes: No    His last A1c value(s) noted to be:      Lab Results   Component Value Date/Time    Hemoglobin A1c 8.5 (H) 06/05/2018 01:57 PM     Dietary Considerations:   States he has been working on his diet. Notes that he almost had a soda one day but decided to drink something different that day. Assessment / Plan      1. Diabetes: Based on his reported readings, he has been fine off of the Lantus. He did not bring his log with him and his wife was not present at the visit today. Discussed plan with patient's provider and will have him continue on the metformin 1000 mg twice daily and repeat an A1c at his next office visit in 2 months. If A1c is not well controlled at next office visit would consider addition of another oral agent such as a DDP-IV inhibitor or an SGLT2 inhibitor. Next follow up scheduled on 9/4 with PCP. Patient verbalized understanding of the information presented and all of the patients questions were answered. AVS was handed to the patient and information reviewed.       Patient advised to call the office with any additional questions or concerns. Notification of recommendations will be sent to Rhona Farrar NP for review.     Thank you for the consult,  Michael Gerber, PharmD, BCPS, BCACP, BC-ADM
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
## 2018-07-10 NOTE — PROGRESS NOTES
1. Have you been to the ER, urgent care clinic or hospitalized since your last visit? YES.     2. Have you seen or consulted any other health care providers outside of the [address] [place] [address] since your last visit (Include any pap smears or colon screening)? NO      Do you have an Advanced Directive? NO    Would you like information on Advanced Directives?  NO

## 2018-07-11 ENCOUNTER — TELEPHONE (OUTPATIENT)
Dept: INTERNAL MEDICINE CLINIC | Age: 33
End: 2018-07-11

## 2018-07-11 LAB
FERRITIN SERPL-MCNC: 593 NG/ML (ref 30–400)
FOLATE SERPL-MCNC: 4.3 NG/ML
VIT B12 SERPL-MCNC: 544 PG/ML (ref 232–1245)

## 2018-07-19 ENCOUNTER — TELEPHONE (OUTPATIENT)
Dept: INTERNAL MEDICINE CLINIC | Age: 33
End: 2018-07-19

## 2018-07-19 NOTE — TELEPHONE ENCOUNTER
[facility] calling, needs office note that states pt has diabetes to fit him for diabetic inserts    Fax [phone].

## 2018-07-25 DIAGNOSIS — E11.8 TYPE 2 DIABETES MELLITUS WITH COMPLICATION, WITHOUT LONG-TERM CURRENT USE OF INSULIN (HCC): ICD-10-CM

## 2018-07-25 RX ORDER — METFORMIN HYDROCHLORIDE 1000 MG/1
1000 TABLET ORAL 2 TIMES DAILY WITH MEALS
Qty: 60 TAB | Refills: 4 | Status: CANCELLED | OUTPATIENT
Start: 2018-07-25

## 2018-09-06 ENCOUNTER — OFFICE VISIT (OUTPATIENT)
Dept: INTERNAL MEDICINE CLINIC | Age: 33
End: 2018-09-06

## 2018-09-06 VITALS
RESPIRATION RATE: 15 BRPM | BODY MASS INDEX: 40.35 KG/M2 | DIASTOLIC BLOOD PRESSURE: 100 MMHG | HEIGHT: 68 IN | SYSTOLIC BLOOD PRESSURE: 150 MMHG | OXYGEN SATURATION: 98 % | HEART RATE: 77 BPM | TEMPERATURE: 98.9 F | WEIGHT: 266.2 LBS

## 2018-09-06 DIAGNOSIS — E11.21 TYPE 2 DIABETES WITH NEPHROPATHY (HCC): Primary | ICD-10-CM

## 2018-09-06 DIAGNOSIS — E66.01 OBESITY, MORBID, BMI 40.0-49.9 (HCC): ICD-10-CM

## 2018-09-06 DIAGNOSIS — Z23 ENCOUNTER FOR IMMUNIZATION: ICD-10-CM

## 2018-09-06 DIAGNOSIS — R68.82 DECREASED LIBIDO: ICD-10-CM

## 2018-09-06 DIAGNOSIS — I10 ESSENTIAL HYPERTENSION: ICD-10-CM

## 2018-09-06 RX ORDER — CHLORTHALIDONE 25 MG/1
25 TABLET ORAL DAILY
Qty: 30 TAB | Refills: 0 | Status: SHIPPED | OUTPATIENT
Start: 2018-09-06 | End: 2018-10-08 | Stop reason: SDUPTHER
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
## 2018-09-06 NOTE — PROGRESS NOTES
1. Have you been to the ER, urgent care clinic or hospitalized since your last visit? NO.     2. Have you seen or consulted any other health care providers outside of the [address] [place] [address] since your last visit (Include any pap smears or colon screening)? NO      Do you have an Advanced Directive? NO    Would you like information on Advanced Directives?  NO

## 2018-09-06 NOTE — MR AVS SNAPSHOT
[facility] 
 
 
 [address], [facility] [address] [facility] 
[phone] Patient: Chris Ramey MRN: [id number] QMO:[birth year] Visit Information Date & Time Provider Department Dept. Phone Encounter #  
 9/6/2018  9:30 AM Sam Bateman NP [facility] [address] [place] [address] [phone] Your Appointments 10/8/2018 10:30 AM  
Office Visit with Sam Bateman NP [facility] [address] [place] [address] ([facility]) Appt Note: ov 1mo. sl  
 [address] [facility], [facility] Andres Benes [address] [place]  
  
   
 [address] [facility] Upcoming Health Maintenance Date Due  
 LIPID PANEL Q1 1984 FOOT EXAM Q1 8/31/1994 EYE EXAM RETINAL OR DILATED Q1 8/31/1994 Pneumococcal 19-64 Medium Risk (1 of 1 - PPSV23) 8/31/2003 DTaP/Tdap/Td series (1 - Tdap) 8/31/2005 Influenza Age 5 to Adult 8/1/2018 HEMOGLOBIN A1C Q6M 12/5/2018 MICROALBUMIN Q1 6/5/2019 Allergies as of 9/6/2018  Review Complete On: 9/6/2018 By: Rosemarie Cosme No Known Allergies Current Immunizations  Reviewed on 6/19/2018 No immunizations on file. Not reviewed this visit You Were Diagnosed With   
  
 Codes Comments Type 2 diabetes with nephropathy (HCC)    -  Primary ICD-10-CM: E11.21 
ICD-9-CM: 250.40, 583.81 Obesity, morbid, BMI 40.0-49.9 (HCC)     ICD-10-CM: E66.01 
ICD-9-CM: 278.01 Essential hypertension     ICD-10-CM: I10 
ICD-9-CM: 401.9 Decreased libido     ICD-10-CM: P96.53 
ICD-9-CM: 799.81 Vitals BP Pulse Temp Resp Height(growth percentile) Weight(growth percentile) (!) 150/100 (BP 1 Location: Left arm, BP Patient Position: Sitting) 77 98.9 °F (37.2 °C) (Oral) 15 5' 8\" (1.727 m) 266 lb 3.2 oz (120.7 kg) SpO2 BMI Smoking Status 98% 40.48 kg/m2 Never Smoker Vitals History BMI and BSA Data Body Mass Index Body Surface Area 40.48 kg/m 2 2.41 m 2 Preferred Pharmacy Pharmacy Name Phone [address] [facility], [address] [place] [address] [phone] Your Updated Medication List  
  
   
This list is accurate as of 9/6/18 10:34 AM.  Always use your most recent med list.  
  
  
  
  
 atorvastatin 20 mg tablet Commonly known as:  LIPITOR Take 1 Tab by mouth daily. chlorthalidone 25 mg tablet Commonly known as:  Lucie Carry Take 1 Tab by mouth daily. Indications: hypertension  
  
 losartan 50 mg tablet Commonly known as:  COZAAR Take 1 Tab by mouth daily. metFORMIN 1,000 mg tablet Commonly known as:  GLUCOPHAGE Take 1 Tab by mouth two (2) times daily (with meals). Indications: type 2 diabetes mellitus  
  
 metoprolol tartrate 50 mg tablet Commonly known as:  LOPRESSOR Take 1 Tab by mouth two (2) times a day. Prescriptions Sent to Pharmacy Refills  
 chlorthalidone (HYGROTEN) 25 mg tablet 0 Sig: Take 1 Tab by mouth daily. Indications: hypertension Class: Normal  
 Pharmacy: [address] [facility] [address] [place] [address], [address] [place] [address] #: [phone] Route: Oral  
  
We Performed the Following REFERRAL TO NUTRITION [REF50 Custom] REFERRAL TO OPHTHALMOLOGY [REF57 Custom] To-Do List   
 09/06/2018 Lab:  HEMOGLOBIN A1C WITH EAG   
  
 09/06/2018 Lab:  LIPID PANEL Referral Information Referral ID Referred By Referred To  
  
 [id number] Analy Vallejo Not Available Visits Status Start Date End Date 1 New Request 9/6/18 9/6/19 If your referral has a status of pending review or denied, additional information will be sent to support the outcome of this decision. Referral ID Referred By Referred To  
 [id number] Analy Vallejo Not Available Visits Status Start Date End Date 1 New Request 9/6/18 9/6/19  If your referral has a status of pending review or denied, additional information will be sent to support the outcome of this decision. Introducing [facility]! [employer] introduces [employer] patient portal. Now you can access parts of your medical record, email your doctor's office, and request medication refills online. 1. In your internet browser, go to [email or web address] 2. Click on the First Time User? Click Here link in the Sign In box. You will see the New Member Sign Up page. 3. Enter your [employer] Access Code exactly as it appears below. You will not need to use this code after youve completed the sign-up process. If you do not sign up before the expiration date, you must request a new code. · [employer] Access Code: [email or web address] Expires: 12/5/2018  8:51 AM 
 
4. Enter the last four digits of your Social Security Number (xxxx) and Date of Birth (mm/dd/yyyy) as indicated and click Submit. You will be taken to the next sign-up page. 5. Create a [employer] ID. This will be your [employer] login ID and cannot be changed, so think of one that is secure and easy to remember. 6. Create a [employer] password. You can change your password at any time. 7. Enter your Password Reset Question and Answer. This can be used at a later time if you forget your password. 8. Enter your e-mail address. You will receive e-mail notification when new information is available in [address] 9. Click Sign Up. You can now view and download portions of your medical record. 10. Click the Download Summary menu link to download a portable copy of your medical information. If you have questions, please visit the Frequently Asked Questions section of the [employer] website. Remember, [employer] is NOT to be used for urgent needs. For medical emergencies, dial 911. Now available from your iPhone and Android! Please provide this summary of care documentation to your next provider. Your primary care clinician is listed as Shawanda Christopher.  If you have any questions after today's visit, please call [phone].

## 2018-09-07 ENCOUNTER — TELEPHONE (OUTPATIENT)
Dept: INTERNAL MEDICINE CLINIC | Age: 33
End: 2018-09-07

## 2018-09-07 LAB
CHOLEST SERPL-MCNC: 161 MG/DL (ref 100–199)
EST. AVERAGE GLUCOSE BLD GHB EST-MCNC: 128 MG/DL
HBA1C MFR BLD: 6.1 % (ref 4.8–5.6)
HDLC SERPL-MCNC: 38 MG/DL
INTERPRETATION, 910389: NORMAL
LDLC SERPL CALC-MCNC: 96 MG/DL (ref 0–99)
Lab: NORMAL
TRIGL SERPL-MCNC: 133 MG/DL (ref 0–149)
VLDLC SERPL CALC-MCNC: 27 MG/DL (ref 5–40)

## 2018-09-07 RX ORDER — ATORVASTATIN CALCIUM 20 MG/1
40 TABLET, FILM COATED ORAL DAILY
Qty: 90 TAB | Refills: 3 | Status: SHIPPED | OUTPATIENT
Start: 2018-09-07 | End: 2018-10-08 | Stop reason: DRUGHIGH

## 2018-09-07 NOTE — PROGRESS NOTES
Tell Mr. Hardwick that his A1c level is 6.1 this is FANTASTIC!! His cholesterol panel shows still need for improvement so increase the Lipitor from 20 mg to 40 mg daily- take 2 tablets from current prescription and I will call in a new script for higher dose.

## 2018-09-07 NOTE — PROGRESS NOTES
Tell MrYamile Hernandez that his A1c level is 6.1 this is FANTASTIC!! His cholesterol panel shows still need for improvement so increase the Lipitor from 20 mg to 40 mg daily- take 2 tablets from current prescription and I will call in a new script for higher dose.

## 2018-09-07 NOTE — TELEPHONE ENCOUNTER
----- Message from Cornelius Ca NP sent at 9/7/2018  5:07 PM EDT -----  Tell Mr. [facility] that his A1c level is 6.1 this is FANTASTIC!! His cholesterol panel shows still need for improvement so increase the Lipitor from 20 mg to 40 mg daily- take 2 tablets from current prescription and I will call in a new script for higher dose.

## 2018-09-12 ENCOUNTER — TELEPHONE (OUTPATIENT)
Dept: INTERNAL MEDICINE CLINIC | Age: 33
End: 2018-09-12

## 2018-09-12 NOTE — TELEPHONE ENCOUNTER
Spoke with patient and given message below to continue with BP medications as prescribed and let him know that the Hygroten is an addition to what he already has. Explained that sometimes it will take multiple meds to lower the BP as they work differently to do this. He verbalized understanding and I confirmed his f/u appt on 10/8/18.

## 2018-09-12 NOTE — TELEPHONE ENCOUNTER
I added Chlorthalidone to his profile last visit and right now I would stay on Metoprolol until next visit and then we can reassess his BP.

## 2018-09-21 ENCOUNTER — DOCUMENTATION ONLY (OUTPATIENT)
Dept: INTERNAL MEDICINE CLINIC | Age: 33
End: 2018-09-21

## 2018-09-21 NOTE — PROGRESS NOTES
PA for Atorvastatin 20mg approved from 9/20/18-9/20/19. Will fax copy of approval to [facility] [place] on file.

## 2018-10-08 ENCOUNTER — TELEPHONE (OUTPATIENT)
Dept: INTERNAL MEDICINE CLINIC | Age: 33
End: 2018-10-08

## 2018-10-08 ENCOUNTER — OFFICE VISIT (OUTPATIENT)
Dept: INTERNAL MEDICINE CLINIC | Age: 33
End: 2018-10-08

## 2018-10-08 VITALS
HEIGHT: 68 IN | DIASTOLIC BLOOD PRESSURE: 90 MMHG | WEIGHT: 265.8 LBS | SYSTOLIC BLOOD PRESSURE: 134 MMHG | BODY MASS INDEX: 40.28 KG/M2 | HEART RATE: 70 BPM | TEMPERATURE: 98.1 F | OXYGEN SATURATION: 97 % | RESPIRATION RATE: 14 BRPM

## 2018-10-08 DIAGNOSIS — I10 ESSENTIAL HYPERTENSION: ICD-10-CM

## 2018-10-08 DIAGNOSIS — E78.5 HYPERLIPIDEMIA LDL GOAL <70: ICD-10-CM

## 2018-10-08 DIAGNOSIS — Z23 ENCOUNTER FOR IMMUNIZATION: Primary | ICD-10-CM

## 2018-10-08 PROBLEM — H26.9 CATARACT OF BOTH EYES: Status: ACTIVE | Noted: 2018-10-08

## 2018-10-08 PROBLEM — H35.033 HYPERTENSIVE RETINOPATHY OF BOTH EYES: Status: ACTIVE | Noted: 2018-10-08

## 2018-10-08 RX ORDER — LOSARTAN POTASSIUM 100 MG/1
100 TABLET ORAL DAILY
Qty: 90 TAB | Refills: 3 | Status: SHIPPED | OUTPATIENT
Start: 2018-10-08 | End: 2018-12-14 | Stop reason: SDUPTHER

## 2018-10-08 RX ORDER — ATORVASTATIN CALCIUM 40 MG/1
40 TABLET, FILM COATED ORAL DAILY
Qty: 90 TAB | Refills: 3 | Status: SHIPPED | OUTPATIENT
Start: 2018-10-08 | End: 2020-01-01 | Stop reason: SDUPTHER

## 2018-10-08 RX ORDER — CHLORTHALIDONE 25 MG/1
25 TABLET ORAL DAILY
Qty: 90 TAB | Refills: 3 | Status: SHIPPED | OUTPATIENT
Start: 2018-10-08 | End: 2020-01-01

## 2018-10-08 NOTE — PROGRESS NOTES
1. Have you been to the ER, urgent care clinic or hospitalized since your last visit? NO.     2. Have you seen or consulted any other health care providers outside of the [address] [place] [address] since your last visit (Include any pap smears or colon screening)? NO      Do you have an Advanced Directive? NO    Would you like information on Advanced Directives?  NO

## 2018-10-08 NOTE — MR AVS SNAPSHOT
[address] [facility] [address] 
 
 
 [address], [address] [facility] 
[phone] Patient: David Fenton MRN: [id number]:[id number] Visit Information Date & Time Provider Department Dept. Phone Encounter #  
 10/8/2018 10:30 AM Pedro Moreau NP [facility] [address] [place] [address] 21 [phone] Your Appointments 12/14/2018  8:00 AM  
Office Visit with Pedro Moreau NP [facility] [address] [place] [address] ([address]) Appt Note: 2 month follow up  
 [address], [address] [id number] [address] [place]  
  
   
 [address], [address] Upcoming Health Maintenance Date Due  
 EYE EXAM RETINAL OR DILATED Q1 8/31/1994 DTaP/Tdap/Td series (1 - Tdap) 8/31/2005 Influenza Age 5 to Adult 8/1/2018 HEMOGLOBIN A1C Q6M 3/6/2019 MICROALBUMIN Q1 6/5/2019 FOOT EXAM Q1 9/6/2019 LIPID PANEL Q1 9/6/2019 Allergies as of 10/8/2018  Review Complete On: 10/8/2018 By: Magui Joshi No Known Allergies Current Immunizations  Reviewed on 6/19/2018 Name Date Influenza Vaccine (Quad) PF  Incomplete Pneumococcal Polysaccharide (PPSV-23) 9/6/2018 10:21 AM  
 Tdap  Incomplete Not reviewed this visit You Were Diagnosed With   
  
 Codes Comments Encounter for immunization    -  Primary ICD-10-CM: K59 ICD-9-CM: V03.89 Essential hypertension     ICD-10-CM: I10 
ICD-9-CM: 401.9 Hyperlipidemia LDL goal <70     ICD-10-CM: E78.5 ICD-9-CM: 272.4 Vitals BP Pulse Temp Resp Height(growth percentile) Weight(growth percentile) 134/90 (BP 1 Location: Left arm, BP Patient Position: Sitting) 70 98.1 °F (36.7 °C) (Oral) 14 5' 8\" (1.727 m) 265 lb 12.8 oz (120.6 kg) SpO2 BMI Smoking Status 97% 40.41 kg/m2 Never Smoker Vitals History BMI and BSA Data Body Mass Index Body Surface Area  40.41 kg/m 2 2.41 m 2  
  
  
 Preferred Pharmacy Pharmacy Name Phone [address] [facility], [address] [place] [address] [phone] Your Updated Medication List  
  
   
This list is accurate as of 10/8/18 11:12 AM.  Always use your most recent med list.  
  
  
  
  
 atorvastatin 40 mg tablet Commonly known as:  LIPITOR Take 1 Tab by mouth daily. chlorthalidone 25 mg tablet Commonly known as:  Mary Ades Take 1 Tab by mouth daily. Indications: hypertension  
  
 losartan 100 mg tablet Commonly known as:  COZAAR Take 1 Tab by mouth daily. metFORMIN 1,000 mg tablet Commonly known as:  GLUCOPHAGE Take 1 Tab by mouth two (2) times daily (with meals). Indications: type 2 diabetes mellitus  
  
 metoprolol tartrate 50 mg tablet Commonly known as:  LOPRESSOR Take 1 Tab by mouth two (2) times a day. Prescriptions Sent to Pharmacy Refills  
 chlorthalidone (HYGROTEN) 25 mg tablet 3 Sig: Take 1 Tab by mouth daily. Indications: hypertension Class: Normal  
 Pharmacy: [address], [facility] [address] [facility] Ph #: [phone] Route: Oral  
 losartan (COZAAR) 100 mg tablet 3 Sig: Take 1 Tab by mouth daily. Class: Normal  
 Pharmacy: [address], [address] [facility] Ph #: [phone] Route: Oral  
 atorvastatin (LIPITOR) 40 mg tablet 3 Sig: Take 1 Tab by mouth daily. Class: Normal  
 Pharmacy: [address], [address] [facility] Ph #: [phone] Route: Oral  
  
We Performed the Following INFLUENZA VIRUS VAC QUAD,SPLIT,PRESV FREE SYRINGE IM [id number] CPT(R)] TETANUS, DIPHTHERIA TOXOIDS AND ACELLULAR PERTUSSIS VACCINE (TDAP), IN INDIVIDS. >=7, IM [id number] CPT(R)] Introducing [facility]!    
 [employer] introduces [employer] patient portal. Now you can access parts of your medical record, email your doctor's office, and request medication refills online. 1. In your internet browser, go to [email or web address] 2. Click on the First Time User? Click Here link in the Sign In box. You will see the New Member Sign Up page. 3. Enter your [employer] Access Code exactly as it appears below. You will not need to use this code after youve completed the sign-up process. If you do not sign up before the expiration date, you must request a new code. · [employer] Access Code: [email or web address] Expires: 12/5/2018  8:51 AM 
 
4. Enter the last four digits of your Social Security Number (xxxx) and Date of Birth (mm/dd/yyyy) as indicated and click Submit. You will be taken to the next sign-up page. 5. Create a [employer] ID. This will be your [employer] login ID and cannot be changed, so think of one that is secure and easy to remember. 6. Create a [employer] password. You can change your password at any time. 7. Enter your Password Reset Question and Answer. This can be used at a later time if you forget your password. 8. Enter your e-mail address. You will receive e-mail notification when new information is available in [address] 9. Click Sign Up. You can now view and download portions of your medical record. 10. Click the Download Summary menu link to download a portable copy of your medical information. If you have questions, please visit the Frequently Asked Questions section of the [employer] website. Remember, [employer] is NOT to be used for urgent needs. For medical emergencies, dial 911. Now available from your iPhone and Android! Please provide this summary of care documentation to your next provider. Your primary care clinician is listed as Tony Boggs. If you have any questions after today's visit, please call [phone].
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
## 2018-10-08 NOTE — PROGRESS NOTES
Patient given influenza vaccine, FLUARIX Quadrivalent, in left deltoid and tetanus vaccine, TDAP, in right deltoid, per verbal order from VASILE Alba. Instructed patient to sit and wait 10-20 minutes before leaving the premises so that we can watch for any complications or adverse reactions. Patient given vaccine information statement handout before vaccine was given. Patient tolerated well without adverse reactions or complications.

## 2018-10-09 ENCOUNTER — TELEPHONE (OUTPATIENT)
Dept: INTERNAL MEDICINE CLINIC | Age: 33
End: 2018-10-09

## 2018-10-10 NOTE — TELEPHONE ENCOUNTER
[facility] calling again.  Advised per Annelise note on 10/8/2018 the losartan was increased to 100 mg daily

## 2018-10-10 NOTE — TELEPHONE ENCOUNTER
I am confused as to why [facility] is calling. The medication was ordered correctly. Do I need to call them?

## 2018-11-08 DIAGNOSIS — E11.8 TYPE 2 DIABETES MELLITUS WITH COMPLICATION, WITHOUT LONG-TERM CURRENT USE OF INSULIN (HCC): ICD-10-CM

## 2018-11-08 RX ORDER — METFORMIN HYDROCHLORIDE 1000 MG/1
1000 TABLET ORAL 2 TIMES DAILY WITH MEALS
Qty: 180 TAB | Refills: 3 | Status: SHIPPED | OUTPATIENT
Start: 2018-11-08 | End: 2018-11-15 | Stop reason: SDUPTHER

## 2018-11-08 NOTE — TELEPHONE ENCOUNTER
Last Visit: 10/08/2018 with VASILE Macias   Next Appointment: 12/14/2018 with VASILE Macias  Previous Refill Encounters: 06/19/2018 per NP Kamryn Klein #[id number] with 4 refills     Requested Prescriptions     Pending Prescriptions Disp Refills    metFORMIN (GLUCOPHAGE) 1,000 mg tablet 180 Tab 3     Sig: Take 1 Tab by mouth two (2) times daily (with meals).

## 2018-11-12 DIAGNOSIS — E11.8 TYPE 2 DIABETES MELLITUS WITH COMPLICATION, WITHOUT LONG-TERM CURRENT USE OF INSULIN (HCC): ICD-10-CM

## 2018-11-12 RX ORDER — METFORMIN HYDROCHLORIDE 1000 MG/1
1000 TABLET ORAL 2 TIMES DAILY WITH MEALS
Qty: 180 TAB | Refills: 3 | Status: CANCELLED | OUTPATIENT
Start: 2018-11-12

## 2018-11-12 NOTE — TELEPHONE ENCOUNTER
It appears Timbo Vogel sent a RX to [facility] for Metformin on 11/08/2018. Sara, Shayna Tech with [employer], states he will be eligible for a refill on 11/19. Please have the patient contact the pharmacy at that time for a refill.

## 2018-11-15 DIAGNOSIS — E11.8 TYPE 2 DIABETES MELLITUS WITH COMPLICATION, WITHOUT LONG-TERM CURRENT USE OF INSULIN (HCC): ICD-10-CM

## 2018-11-15 RX ORDER — METFORMIN HYDROCHLORIDE 1000 MG/1
1000 TABLET ORAL 2 TIMES DAILY WITH MEALS
Qty: 180 TAB | Refills: 3 | Status: SHIPPED | OUTPATIENT
Start: 2018-11-15 | End: 2020-01-01 | Stop reason: SDUPTHER

## 2018-12-14 ENCOUNTER — OFFICE VISIT (OUTPATIENT)
Dept: INTERNAL MEDICINE CLINIC | Age: 33
End: 2018-12-14

## 2018-12-14 VITALS
OXYGEN SATURATION: 99 % | DIASTOLIC BLOOD PRESSURE: 80 MMHG | TEMPERATURE: 99 F | RESPIRATION RATE: 16 BRPM | HEIGHT: 68 IN | HEART RATE: 75 BPM | SYSTOLIC BLOOD PRESSURE: 138 MMHG | BODY MASS INDEX: 41.37 KG/M2 | WEIGHT: 273 LBS

## 2018-12-14 DIAGNOSIS — I10 ESSENTIAL HYPERTENSION: Primary | ICD-10-CM

## 2018-12-14 DIAGNOSIS — E11.37X9 CONTROLLED TYPE 2 DIABETES MELLITUS WITH DIABETIC MACULAR EDEMA RESOLVED AFTER TREATMENT, WITHOUT LONG-TERM CURRENT USE OF INSULIN, UNSPECIFIED LATERALITY (HCC): ICD-10-CM

## 2018-12-14 DIAGNOSIS — E66.01 SEVERE OBESITY (BMI >= 40) (HCC): ICD-10-CM

## 2018-12-14 DIAGNOSIS — E78.5 HYPERLIPIDEMIA LDL GOAL <70: ICD-10-CM

## 2018-12-14 RX ORDER — LOSARTAN POTASSIUM 100 MG/1
100 TABLET ORAL DAILY
Qty: 90 TAB | Refills: 3 | Status: SHIPPED | OUTPATIENT
Start: 2018-12-14 | End: 2020-01-01 | Stop reason: SDUPTHER

## 2018-12-14 RX ORDER — LANOLIN ALCOHOL/MO/W.PET/CERES
400 CREAM (GRAM) TOPICAL DAILY
Qty: 90 TAB | Refills: 3 | Status: SHIPPED | OUTPATIENT
Start: 2018-12-14 | End: 2021-08-09

## 2018-12-14 RX ORDER — METOPROLOL TARTRATE 50 MG/1
50 TABLET ORAL 2 TIMES DAILY
Qty: 90 TAB | Refills: 3 | Status: SHIPPED | OUTPATIENT
Start: 2018-12-14 | End: 2020-01-01

## 2018-12-14 RX ORDER — LANOLIN ALCOHOL/MO/W.PET/CERES
500 CREAM (GRAM) TOPICAL DAILY
Qty: 90 TAB | Refills: 3 | Status: SHIPPED | OUTPATIENT
Start: 2018-12-14 | End: 2020-06-09 | Stop reason: ALTCHOICE

## 2018-12-14 NOTE — PROGRESS NOTES
ROOM # 600 [facility] presents today for   Chief Complaint   Patient presents with    Follow-up       Arie GA [address] [place] [address] preferred language for health care discussion is english/other. Depression Screening:  PHQ over the last two weeks 12/14/2018 9/6/2018 7/10/2018 6/5/2018 6/5/2018   Little interest or pleasure in doing things Not at all Several days Several days Nearly every day Nearly every day   Feeling down, depressed, irritable, or hopeless Not at all Several days Several days Nearly every day Nearly every day   Total Score PHQ 2 0 2 2 6 6       Learning Assessment:  No flowsheet data found. Abuse Screening:  No flowsheet data found. Fall Risk  No flowsheet data found. Visit Vitals  /80 (BP 1 Location: Left arm, BP Patient Position: Sitting)   Pulse 75   Temp 99 °F (37.2 °C) (Oral)   Resp 16   Ht 5' 8\" (1.727 m)   Wt 273 lb (123.8 kg)   SpO2 99%   BMI 41.51 kg/m²       Health Maintenance reviewed and discussed per provider. Yes    Rico Church is due for There are no preventive care reminders to display for this patient. Please order/place referral if appropriate. Advance Directive:  1. Do you have an advance directive in place? Patient Reply: No    2. If not, would you like material regarding how to put one in place? Patient Reply: No    Coordination of Care:  1. Have you been to the ER, urgent care clinic since your last visit? Hospitalized since your last visit?  No
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
## 2018-12-15 LAB
BUN SERPL-MCNC: 17 MG/DL (ref 6–20)
BUN/CREAT SERPL: 22 (ref 9–20)
CALCIUM SERPL-MCNC: 9.5 MG/DL (ref 8.7–10.2)
CHLORIDE SERPL-SCNC: 102 MMOL/L (ref 96–106)
CHOLEST SERPL-MCNC: 155 MG/DL (ref 100–199)
CO2 SERPL-SCNC: 29 MMOL/L (ref 20–29)
CREAT SERPL-MCNC: 0.77 MG/DL (ref 0.76–1.27)
EST. AVERAGE GLUCOSE BLD GHB EST-MCNC: 134 MG/DL
GLUCOSE SERPL-MCNC: 115 MG/DL (ref 65–99)
HBA1C MFR BLD: 6.3 % (ref 4.8–5.6)
HDLC SERPL-MCNC: 32 MG/DL
INTERPRETATION, 910389: NORMAL
LDLC SERPL CALC-MCNC: 92 MG/DL (ref 0–99)
POTASSIUM SERPL-SCNC: 4.3 MMOL/L (ref 3.5–5.2)
SODIUM SERPL-SCNC: 143 MMOL/L (ref 134–144)
TRIGL SERPL-MCNC: 154 MG/DL (ref 0–149)
VLDLC SERPL CALC-MCNC: 31 MG/DL (ref 5–40)

## 2018-12-16 NOTE — PROGRESS NOTES
Please inform the patient that his A1C went from 6.1 to 6.3. The cholesterol panel shows triglycerides are elevated and LDL (bad cholesterol) is 92- this needs to be below 70 for you. The A1C  is still decent range however with triglycerides elevated it is showing that diet is in excess of carbohydrates. This time of the year is difficult but work on limiting red meat, only eat lean meats, carbohydrates need to be in the vegetables and limit potatoes, rices, pastas, sweets and any sweet drinks.

## 2018-12-17 ENCOUNTER — TELEPHONE (OUTPATIENT)
Dept: INTERNAL MEDICINE CLINIC | Age: 33
End: 2018-12-17

## 2018-12-17 NOTE — TELEPHONE ENCOUNTER
----- Message from Chuy Reeder NP sent at 12/16/2018 10:47 AM EST -----  Please inform the patient that his A1C went from 6.1 to 6.3. The cholesterol panel shows triglycerides are elevated and LDL (bad cholesterol) is 92- this needs to be below 70 for you. The A1C  is still decent range however with triglycerides elevated it is showing that diet is in excess of carbohydrates. This time of the year is difficult but work on limiting red meat, only eat lean meats, carbohydrates need to be in the vegetables and limit potatoes, rices, pastas, sweets and any sweet drinks.

## 2019-02-12 PROBLEM — E11.3593 PROLIFERATIVE DIABETIC RETINOPATHY OF BOTH EYES (HCC): Status: ACTIVE | Noted: 2018-06-19

## 2019-12-04 ENCOUNTER — TELEPHONE (OUTPATIENT)
Dept: INTERNAL MEDICINE CLINIC | Age: 34
End: 2019-12-04

## 2019-12-04 NOTE — TELEPHONE ENCOUNTER
Pt calling asking to be seen for a er follow up for high bp and gastro issues. Naa Valencia is no longer with practice and he is asking to see Dr. Diane Rogel as new pcp. Please advise.

## 2019-12-09 NOTE — TELEPHONE ENCOUNTER
Left voicemail for patient letting him know Dr. Poppy Gomez can fit him in December 18, 2019 at [address] [place] [address]. Added patient to schedule, but did not cancel his January appointment in case he is unable to make the 12/18 appointment.

## 2019-12-18 ENCOUNTER — OFFICE VISIT (OUTPATIENT)
Dept: INTERNAL MEDICINE CLINIC | Age: 34
End: 2019-12-18

## 2019-12-18 VITALS
RESPIRATION RATE: 18 BRPM | TEMPERATURE: 98.7 F | SYSTOLIC BLOOD PRESSURE: 159 MMHG | BODY MASS INDEX: 45.86 KG/M2 | HEIGHT: 68 IN | OXYGEN SATURATION: 99 % | WEIGHT: 302.6 LBS | DIASTOLIC BLOOD PRESSURE: 84 MMHG | HEART RATE: 98 BPM

## 2019-12-18 DIAGNOSIS — E11.37X9 CONTROLLED TYPE 2 DIABETES MELLITUS WITH DIABETIC MACULAR EDEMA RESOLVED AFTER TREATMENT, WITHOUT LONG-TERM CURRENT USE OF INSULIN, UNSPECIFIED LATERALITY (HCC): Primary | ICD-10-CM

## 2019-12-18 DIAGNOSIS — Z23 ENCOUNTER FOR IMMUNIZATION: ICD-10-CM

## 2019-12-18 DIAGNOSIS — N52.9 ERECTILE DYSFUNCTION, UNSPECIFIED ERECTILE DYSFUNCTION TYPE: ICD-10-CM

## 2019-12-18 DIAGNOSIS — D64.9 ANEMIA, UNSPECIFIED TYPE: ICD-10-CM

## 2019-12-18 DIAGNOSIS — R06.83 SNORING: ICD-10-CM

## 2019-12-18 DIAGNOSIS — E78.5 HYPERLIPIDEMIA LDL GOAL <70: ICD-10-CM

## 2019-12-18 DIAGNOSIS — I10 ESSENTIAL HYPERTENSION: ICD-10-CM

## 2019-12-18 RX ORDER — AMLODIPINE BESYLATE 5 MG/1
5 TABLET ORAL DAILY
Qty: 30 TAB | Refills: 6 | Status: SHIPPED | OUTPATIENT
Start: 2019-12-18 | End: 2020-01-02

## 2019-12-18 NOTE — PATIENT INSTRUCTIONS
Vaccine Information Statement Influenza (Flu) Vaccine (Inactivated or Recombinant): What You Need to Know Many Vaccine Information Statements are available in Pashto and other languages. See www.immunize.org/vis Hojas de información sobre vacunas están disponibles en español y en muchos otros idiomas. Visite www.immunize.org/vis 1. Why get vaccinated? Influenza vaccine can prevent influenza (flu). Flu is a contagious disease that spreads around the [facility] every year, usually between October and May. Anyone can get the flu, but it is more dangerous for some people. Infants and young children, people 72years of age and older, pregnant women, and people with certain health conditions or a weakened immune system are at greatest risk of flu complications. Pneumonia, bronchitis, sinus infections and ear infections are examples of flu-related complications. If you have a medical condition, such as heart disease, cancer or diabetes, flu can make it worse. Flu can cause fever and chills, sore throat, muscle aches, fatigue, cough, headache, and runny or stuffy nose. Some people may have vomiting and diarrhea, though this is more common in children than adults. Each year thousands of people in the [facility] die from flu, and many more are hospitalized. Flu vaccine prevents millions of illnesses and flu-related visits to the doctor each year. 2. Influenza vaccines CDC recommends everyone 10months of age and older get vaccinated every flu season. Children 6 months through 6years of age may need 2 doses during a single flu season. Everyone else needs only 1 dose each flu season. It takes about 2 weeks for protection to develop after vaccination. There are many flu viruses, and they are always changing. Each year a new flu vaccine is made to protect against three or four viruses that are likely to cause disease in the upcoming flu season.  Even when the vaccine doesnt exactly match these viruses, it may still provide some protection. Influenza vaccine does not cause flu. Influenza vaccine may be given at the same time as other vaccines. 3. Talk with your health care provider Tell your vaccine provider if the person getting the vaccine: 
 Has had an allergic reaction after a previous dose of influenza vaccine, or has any severe, life-threatening allergies.  Has ever had Guillain-Barré Syndrome (also called GBS). In some cases, your health care provider may decide to postpone influenza vaccination to a future visit. People with minor illnesses, such as a cold, may be vaccinated. People who are moderately or severely ill should usually wait until they recover before getting influenza vaccine. Your health care provider can give you more information. 4. Risks of a reaction  Soreness, redness, and swelling where shot is given, fever, muscle aches, and headache can happen after influenza vaccine.  There may be a very small increased risk of Guillain-Barré Syndrome (GBS) after inactivated influenza vaccine (the flu shot). [employer] children who get the flu shot along with pneumococcal vaccine (PCV13), and/or DTaP vaccine at the same time might be slightly more likely to have a seizure caused by fever. Tell your health care provider if a child who is getting flu vaccine has ever had a seizure. People sometimes faint after medical procedures, including vaccination. Tell your provider if you feel dizzy or have vision changes or ringing in the ears. As with any medicine, there is a very remote chance of a vaccine causing a severe allergic reaction, other serious injury, or death. 5. What if there is a serious problem? An allergic reaction could occur after the vaccinated person leaves the clinic.  If you see signs of a severe allergic reaction (hives, swelling of the face and throat, difficulty breathing, a fast heartbeat, dizziness, or weakness), call 9-1-1 and get the person to the nearest hospital. 
 
For other signs that concern you, call your health care provider. Adverse reactions should be reported to the Vaccine Adverse Event Reporting System (VAERS). Your health care provider will usually file this report, or you can do it yourself. Visit the VAERS website at www.vaers. hhs.gov or call 3-908.261.4962. VAERS is only for reporting reactions, and VAERS staff do not give medical advice. 6. The National Vaccine Injury Compensation Program 
 
The [facility] Vaccine Injury Compensation Program (VICP) is a federal program that was created to compensate people who may have been injured by certain vaccines. Visit the VICP website at www.[facility].gov/vaccinecompensation or call 9-193.900.2032 to learn about the program and about filing a claim. There is a time limit to file a claim for compensation. 7. How can I learn more?  Ask your health care provider.  Call your local or state health department.  Contact the Centers for Disease Control and Prevention (CDC): 
- Call 1-920.661.3623 (6-471-RLG-INFO) or 
- Visit CDCs influenza website at www.cdc.gov/flu Vaccine Information Statement (Interim) Inactivated Influenza Vaccine 8/15/2019 
[address] [id number] Department of Health and [place] Centers for Disease Control and Prevention Office Use Only Health Maintenance Due Topic Date Due  
 MICROALBUMIN Q1  06/05/2019  
 HEMOGLOBIN A1C Q6M  06/14/2019  LIPID PANEL Q1  12/14/2019

## 2019-12-18 NOTE — PROGRESS NOTES
Patel Marroquin presents today for   Chief Complaint   Patient presents with   [address]    ED Follow-up     [facility] 12-04-19 for Hyperglycemia     The patient states it has been about a year since he has taken his blood pressure medications, due to when taking the medications he would feel his heart beat hard, so he stopped taking all,  and cholesterol medication he ran out of medication. At the present time the patient is only taking Meftormin medication for his blood sugar control. Is someone accompanying this pt? Yes wife    Is the patient using any DME equipment during [address]? no    Depression Screening:  3 most recent PHQ Screens 12/18/2019   Little interest or pleasure in doing things Several days   Feeling down, depressed, irritable, or hopeless -   Total Score PHQ 2 -       Learning Assessment:  Learning Assessment 12/18/2019   PRIMARY LEARNER Patient   HIGHEST LEVEL OF EDUCATION - PRIMARY LEARNER  SOME COLLEGE   BARRIERS PRIMARY LEARNER NONE   CO-LEARNER CAREGIVER No   PRIMARY LANGUAGE ENGLISH   LEARNER PREFERENCE PRIMARY DEMONSTRATION   ANSWERED BY patient   RELATIONSHIP SELF       Abuse Screening:  Abuse Screening Questionnaire 12/18/2019   Do you ever feel afraid of your partner? N   Are you in a relationship with someone who physically or mentally threatens you? N   Is it safe for you to go home? Y       Fall Risk  No flowsheet data found. Health Maintenance reviewed and discussed and ordered per Provider. Health Maintenance Due   Topic Date Due    MICROALBUMIN Q1  06/05/2019    HEMOGLOBIN A1C Q6M  06/14/2019    LIPID PANEL Q1  12/14/2019         Coordination of Care:  1. Have you been to the ER, urgent care clinic since your last visit? Hospitalized since your last visit? Yes When: 12-04-19 Where: [facility] Reason: Hyperglycemia    2.  Have you seen or consulted any other health care providers outside of the [address] [place] [address] since your last visit? Include any pap smears or colon screening. no    Noemy Schmidt 1985 male   who presents for routine immunizations. Patient denies any symptoms , reactions or allergies that would exclude them from being immunized today. Risks and adverse reactions were discussed and the VIS was given to them. All questions were addressed. Influenza Vaccine injected into the Right Deltoid, Lot:[id number], Expires:6-30-20, [id number], :Tipbit administered per Dr Anna Nance with read back. Patient was observed for 20 min post injection. There were no reactions observed.      Abdon Khan

## 2019-12-18 NOTE — PROGRESS NOTES
INTERNISTS OF [facility]:  12/18/2019, MRN: [id number]      Cynthia Whipple is a 27 y.o. male and presents to clinic to [address] (ROOM [address]) and ED Follow-up ([facility] [address] ER 12-04-19 for Hyperglycemia)    Subjective: The patient is a 26-year-old male with history of hypertension, type 2 diabetes, lumbar facet arthropathy, obesity, left transmetatarsal amputation secondary to a diabetic foot infection, and HLD. 1.  Type 2 Diabetes: He is present taking metformin. He is overdue for routine labs. He is not adhering to a diabetic diet. Weight is 302lbs. His last eye exam: yesterday. +H/o DM retinopathy. 2. HTN: BP is 159/84 today. He takes metoprolol and losartan along with chlorthalidone. He stopped his rx a year ago because of some ED sx. ED sx did not improve with stopping his BP meds. Snoring hx: yes. Drug use: none. Tobacco use: none. ETOH: none. 3. HLD: He was taking Lipitor. No adverse side effects of taking his medication. He ran out of this rx >1 month ago. 4.  Anemia: Present for over 6 months per review of our EHR and [facility] EHR. No bleeding. Patient Active Problem List    Diagnosis Date Noted    Hyperlipidemia LDL goal <70 12/14/2018    Cataract of both eyes 10/08/2018    Hypertensive retinopathy of both eyes 10/08/2018    Essential hypertension 09/06/2018    Controlled type 2 diabetes mellitus, without long-term current use of insulin ([facility] [address] 06/19/2018    Proliferative diabetic retinopathy of both eyes ([facility] [address] 06/19/2018    Obesity, morbid, BMI 40.0-49.9 ([facility] [address] 05/20/2018    Lumbar facet arthropathy 05/20/2018       Current Outpatient Medications   Medication Sig Dispense Refill    metFORMIN (GLUCOPHAGE) 1,000 mg tablet Take 1 Tab by mouth two (2) times daily (with meals). 180 Tab 3    metoprolol tartrate (LOPRESSOR) 50 mg tablet Take 1 Tab by mouth two (2) times a day. 90 Tab 3    losartan (COZAAR) 100 mg tablet Take 1 Tab by mouth daily.  90 Tab 3    cyanocobalamin (VITAMIN B12) 500 mcg tablet Take 1 Tab by mouth daily. 90 Tab 3    folic acid 496 mcg tablet Take 1 Tab by mouth daily. 90 Tab 3    chlorthalidone (HYGROTEN) 25 mg tablet Take 1 Tab by mouth daily. Indications: hypertension 90 Tab 3    atorvastatin (LIPITOR) 40 mg tablet Take 1 Tab by mouth daily. 90 Tab 3       No Known Allergies    Past Medical History:   Diagnosis Date    Diabetes ([facility] [address]     Hypertension        Past Surgical History:   Procedure Laterality Date    HX AMPUTATION TOE         No family history on file. Social History     Tobacco Use    Smoking status: Never Smoker    Smokeless tobacco: Never Used   Substance Use Topics    Alcohol use: No       ROS   Review of Systems   Constitutional: Negative for chills and fever. HENT: Negative for ear pain and sore throat. Eyes: Negative for blurred vision and pain. Respiratory: Negative for cough and shortness of breath. Cardiovascular: Negative for chest pain. Gastrointestinal: Negative for abdominal pain, blood in stool and melena. Genitourinary: Negative for dysuria and hematuria. Musculoskeletal: Negative for joint pain and myalgias. Skin: Negative for rash. Neurological: Negative for tingling, focal weakness and headaches. Endo/Heme/Allergies: Does not bruise/bleed easily. Psychiatric/Behavioral: Negative for substance abuse. Objective     Vitals:    12/18/19 1258   BP: 159/84   Pulse: 98   Resp: 18   Temp: 98.7 °F (37.1 °C)   TempSrc: Oral   SpO2: 99%   Weight: 302 lb 9.6 oz (137.3 kg)   Height: 5' 8\" (1.727 m)   PainSc:   2   PainLoc: Foot       Physical Exam  Vitals signs and nursing note reviewed. Constitutional:       Appearance: Normal appearance. HENT:      Head: Normocephalic and atraumatic. Right Ear: External ear normal.      Left Ear: External ear normal.      Nose: Nose normal.      Mouth/Throat:      Pharynx: No oropharyngeal exudate or posterior oropharyngeal erythema.    Eyes: General:         Right eye: No discharge. Left eye: No discharge. Conjunctiva/sclera: Conjunctivae normal.   Neck:      Musculoskeletal: Neck supple. No muscular tenderness. Cardiovascular:      Rate and Rhythm: Normal rate and regular rhythm. Pulses: Normal pulses. Heart sounds: Normal heart sounds. Pulmonary:      Effort: Pulmonary effort is normal.      Breath sounds: Normal breath sounds. Abdominal:      General: Abdomen is flat. Bowel sounds are normal. There is no distension. Palpations: Abdomen is soft. Tenderness: There is no tenderness. Musculoskeletal:         General: No swelling or tenderness (BUE). Skin:     General: Skin is warm and dry. Findings: No erythema. Neurological:      General: No focal deficit present. Mental Status: He is alert and oriented to person, place, and time.       Gait: Gait normal.   Psychiatric:         Mood and Affect: Mood normal.         Diabetic foot exam:     Left: Filament test normal sensation with micro filament   Pulse DP: 2+ (normal)   Pulse PT: 2+ (normal)   Deformities: No lacerations/ulcers  Right: Filament test normal sensation with micro filament   Pulse DP: 2+ (normal)   Pulse PT: 2+ (normal)   Deformities: No lacerations/ulcers  LABS   Data Review:   Lab Results   Component Value Date/Time    WBC 10.0 06/05/2018 01:57 PM    HGB 10.4 (L) 06/19/2018 12:23 PM    HCT 31.5 (L) 06/19/2018 12:23 PM    PLATELET 416 77/35/6084 01:57 PM    MCV 81 06/05/2018 01:57 PM       Lab Results   Component Value Date/Time    Sodium 143 12/14/2018 08:38 AM    Potassium 4.3 12/14/2018 08:38 AM    Chloride 102 12/14/2018 08:38 AM    CO2 29 12/14/2018 08:38 AM    Glucose 115 (H) 12/14/2018 08:38 AM    BUN 17 12/14/2018 08:38 AM    Creatinine 0.77 12/14/2018 08:38 AM    BUN/Creatinine ratio 22 (H) 12/14/2018 08:38 AM    GFR est  12/14/2018 08:38 AM    GFR est non- 12/14/2018 08:38 AM    Calcium 9.5 12/14/2018 08:38 AM Lab Results   Component Value Date/Time    Cholesterol, total 155 12/14/2018 08:38 AM    HDL Cholesterol 32 (L) 12/14/2018 08:38 AM    LDL, calculated 92 12/14/2018 08:38 AM    VLDL, calculated 31 12/14/2018 08:38 AM    Triglyceride 154 (H) 12/14/2018 08:38 AM       Lab Results   Component Value Date/Time    Hemoglobin A1c 6.3 (H) 12/14/2018 08:38 AM       Assessment/Plan:   1. Essential hypertension  Checking labs today. Ordering amlodipine since I did not know his renal function at this time. Referral placed to Sleep Medicine  Placing a pharmacy consultation referral order for assistance with BP management and compliance. 2. Controlled type 2 diabetes mellitus:   Continue with Rx as prescribed. A diabetic foot exam was performed today.  We discussed the importance of weight reduction. I encouraged him to carb count, consuming a with a 45 g of carb per meal and no more than 15 g of carb per snack. I will recheck his weight at his follow-up appointment. He was given a carb counting book. [address] labs today. We discussed the complications associated with poorly controlled type 2 diabetes. We discussed his A1c, what this is, and his goal A1c.  I encouraged him to check his blood glucose. We briefly discussed his blood glucose goals.  Placing a referral to our clinical pharmacy team for assistance with diabetes management    3. Hyperlipidemia LDL goal <70  Checking labs today. We discussed how he should be on a cholesterol-lowering medication. We will wait first on his lab results prior to ordering this medication and dosing it. 4. Anemia: Etiology is unknown. [address] labs today. 5. ED: Likely type II poorly controlled BP/DM, which was discussed with him today.  We will try to optimize medical therapy for #1 and #2.   If he continues to have 80 symptoms wants #1 number #2 well-controlled, he will need additional evaluation by a urologist.    6.  Health Maintenance:  Mike Villavicencio flu vaccine was administered today. Health Maintenance Due   Topic Date Due    MICROALBUMIN Q1  06/05/2019    HEMOGLOBIN A1C Q6M  06/14/2019    Influenza Age 9 to Adult  08/01/2019    FOOT EXAM Q1  09/06/2019    LIPID PANEL Q1  12/14/2019     Lab review: labs are reviewed in our EHR and in the [address]    I have discussed the diagnosis with the patient and the intended plan as seen in the above orders. The patient has received an after-visit summary and questions were answered concerning future plans. I have discussed medication side effects and warnings with the patient as well. I have reviewed the plan of care with the patient, accepted their input and they are in agreement with the treatment goals. All questions were answered. The patient understands the plan of care. Handouts provided today with above information. Pt instructed if symptoms worsen to call the office or report to the ED for continued care. Greater than 50% of the visit time was spent in counseling and/or coordination of care. I spent 40 minutes with the patient for this encounter, 25 of which were spent counseling him as described above. Voice recognition was used to generate this report, which may have resulted in some phonetic based errors in grammar and contents. Even though attempts were made to correct all the mistakes, some may have been missed, and remained in the body of the document.           Ulisses Correa MD

## 2019-12-19 LAB
ALBUMIN SERPL-MCNC: 3.8 G/DL (ref 3.5–5.5)
ALBUMIN/CREAT UR: 3127.1 MG/G CREAT (ref 0–30)
ALBUMIN/GLOB SERPL: 1.2 {RATIO} (ref 1.2–2.2)
ALP SERPL-CCNC: 72 IU/L (ref 39–117)
ALT SERPL-CCNC: 15 IU/L (ref 0–44)
AST SERPL-CCNC: 8 IU/L (ref 0–40)
BASOPHILS # BLD AUTO: 0.1 X10E3/UL (ref 0–0.2)
BASOPHILS NFR BLD AUTO: 1 %
BILIRUB SERPL-MCNC: 0.2 MG/DL (ref 0–1.2)
BUN SERPL-MCNC: 29 MG/DL (ref 6–20)
BUN/CREAT SERPL: 24 (ref 9–20)
CALCIUM SERPL-MCNC: 9.7 MG/DL (ref 8.7–10.2)
CHLORIDE SERPL-SCNC: 99 MMOL/L (ref 96–106)
CO2 SERPL-SCNC: 24 MMOL/L (ref 20–29)
CREAT SERPL-MCNC: 1.23 MG/DL (ref 0.76–1.27)
CREAT UR-MCNC: 67.5 MG/DL
EOSINOPHIL # BLD AUTO: 0.3 X10E3/UL (ref 0–0.4)
EOSINOPHIL NFR BLD AUTO: 3 %
ERYTHROCYTE [DISTWIDTH] IN BLOOD BY AUTOMATED COUNT: 13.1 % (ref 12.3–15.4)
FERRITIN SERPL-MCNC: 352 NG/ML (ref 30–400)
GLOBULIN SER CALC-MCNC: 3.3 G/DL (ref 1.5–4.5)
GLUCOSE SERPL-MCNC: 299 MG/DL (ref 65–99)
HBA1C MFR BLD: 12.3 % (ref 4.8–5.6)
HCT VFR BLD AUTO: 36.1 % (ref 37.5–51)
HGB BLD-MCNC: 12.1 G/DL (ref 13–17.7)
IMM GRANULOCYTES # BLD AUTO: 0.1 X10E3/UL (ref 0–0.1)
IMM GRANULOCYTES NFR BLD AUTO: 1 %
IRON SATN MFR SERPL: 24 % (ref 15–55)
IRON SERPL-MCNC: 54 UG/DL (ref 38–169)
LYMPHOCYTES # BLD AUTO: 3.1 X10E3/UL (ref 0.7–3.1)
LYMPHOCYTES NFR BLD AUTO: 32 %
MCH RBC QN AUTO: 26.4 PG (ref 26.6–33)
MCHC RBC AUTO-ENTMCNC: 33.5 G/DL (ref 31.5–35.7)
MCV RBC AUTO: 79 FL (ref 79–97)
MICROALBUMIN UR-MCNC: 2110.8 UG/ML
MONOCYTES # BLD AUTO: 0.6 X10E3/UL (ref 0.1–0.9)
MONOCYTES NFR BLD AUTO: 7 %
NEUTROPHILS # BLD AUTO: 5.5 X10E3/UL (ref 1.4–7)
NEUTROPHILS NFR BLD AUTO: 56 %
PLATELET # BLD AUTO: 371 X10E3/UL (ref 150–450)
POTASSIUM SERPL-SCNC: 4.5 MMOL/L (ref 3.5–5.2)
PROT SERPL-MCNC: 7.1 G/DL (ref 6–8.5)
RBC # BLD AUTO: 4.58 X10E6/UL (ref 4.14–5.8)
SODIUM SERPL-SCNC: 137 MMOL/L (ref 134–144)
T4 FREE SERPL-MCNC: 1.39 NG/DL (ref 0.82–1.77)
TIBC SERPL-MCNC: 227 UG/DL (ref 250–450)
TSH SERPL DL<=0.005 MIU/L-ACNC: 1.99 UIU/ML (ref 0.45–4.5)
UIBC SERPL-MCNC: 173 UG/DL (ref 111–343)
WBC # BLD AUTO: 9.6 X10E3/UL (ref 3.4–10.8)

## 2020-01-01 DIAGNOSIS — E11.8 TYPE 2 DIABETES MELLITUS WITH COMPLICATION, WITHOUT LONG-TERM CURRENT USE OF INSULIN (HCC): ICD-10-CM

## 2020-01-01 DIAGNOSIS — E78.5 HYPERLIPIDEMIA LDL GOAL <70: ICD-10-CM

## 2020-01-01 DIAGNOSIS — I10 ESSENTIAL HYPERTENSION: ICD-10-CM

## 2020-01-01 RX ORDER — LOSARTAN POTASSIUM 50 MG/1
50 TABLET ORAL DAILY
Qty: 90 TAB | Refills: 1 | Status: SHIPPED | OUTPATIENT
Start: 2020-01-01 | End: 2020-01-02

## 2020-01-01 RX ORDER — ATORVASTATIN CALCIUM 40 MG/1
40 TABLET, FILM COATED ORAL DAILY
Qty: 90 TAB | Refills: 3 | Status: SHIPPED | OUTPATIENT
Start: 2020-01-01 | End: 2020-12-13

## 2020-01-01 RX ORDER — METFORMIN HYDROCHLORIDE 1000 MG/1
1000 TABLET ORAL 2 TIMES DAILY WITH MEALS
Qty: 180 TAB | Refills: 1 | Status: SHIPPED | OUTPATIENT
Start: 2020-01-01 | End: 2020-07-07 | Stop reason: SDUPTHER

## 2020-01-02 ENCOUNTER — TELEPHONE (OUTPATIENT)
Dept: INTERNAL MEDICINE CLINIC | Age: 35
End: 2020-01-02

## 2020-01-02 DIAGNOSIS — E11.9 TYPE 2 DIABETES MELLITUS WITHOUT COMPLICATION, WITHOUT LONG-TERM CURRENT USE OF INSULIN (HCC): Primary | ICD-10-CM

## 2020-01-02 DIAGNOSIS — I10 ESSENTIAL HYPERTENSION: ICD-10-CM

## 2020-01-02 RX ORDER — LOSARTAN POTASSIUM 100 MG/1
100 TABLET ORAL DAILY
Qty: 90 TAB | Refills: 1 | Status: SHIPPED | OUTPATIENT
Start: 2020-01-02 | End: 2020-07-07 | Stop reason: SDUPTHER

## 2020-01-02 NOTE — TELEPHONE ENCOUNTER
Please let him know that he can stop norvasc and take 100mg daily of losartan instead of 50mg daily. The swelling in the feet will resolve with stopping norvasc. Meanwhile, if his BP is >140/90 tomorrow when he meets with our clinical pharmacy team, we may add a diuretic rx for better BP control and BLE edema.      Dr. Fareed Worthington  Internists of [facility], [address] [place] [address], [address] [place] [address] Str.  Phone: [phone]  Fax: [phone]

## 2020-01-02 NOTE — TELEPHONE ENCOUNTER
Chief Complaint   Patient presents with    Results     labs done 12-18-19 per Dr Chantell Buchanan     01-02-20 Patient reached and 2 identifiers were used: Full Name, and Date of Birth verified. The patient informed of results, and understands there are 3 medications ordered yesterday by Dr Chantell Buchanan: Metformin 1,000 mg tablet, Losartan 50 mg tablet, and Atorvastatin 40 mg tablet to be taken as prescribed. The patient asked if he should stop taking the Norvasc 5 mg tablet? The patient started taking this a week or so ago, and he feels it is causing his feet to swell, hurts when he walks. The patient informed that Dr Chantell Buchanan is with her patients, and we will get back with him on whether he should stop or take the Norvasc medication. The patient did take the Norvasc medication this morning.

## 2020-01-02 NOTE — PROGRESS NOTES
He will need a statin and ARB or ACEi. Please schedule to meet with him this month for assistance with DM management. I am ordering low dose losartan for now and refilling his lipitor (he used to take).     Dr. Brian Wall  Internists of [facility], [address] [place] [address], [address] [place] [address] Str.  Phone: [phone]  Fax: [phone]

## 2020-01-02 NOTE — TELEPHONE ENCOUNTER
----- Message from Ulisses Correa MD sent at 1/1/2020  9:47 PM EST -----  Please let him know that his diabetes is not controlled well with his A1C measuring 12.3. It needs to be under 7! He is showing signs of microalbuminuria. There is strain on his renal function. TFTs are normal. He is mildly anemic with a hemoglobin measuring 12. 1. MCV is normal. RDW is normal. The remainder of his CBC is unremarkable. I am ordering metformin 1000mg bid. He will need meter supplies and BG checks. We will need to determine how insulin resistant he is. There's a good chance he needs insulin to prevent further damage to his kidneys. Please schedule him with Cecilia Arteaga, our clinical pharmacist, this month to assist with his DM management.     Dr. Mary Aragon  Internists of [address], [facility] [address]  Phone: [phone]  Fax: [phone]

## 2020-01-02 NOTE — TELEPHONE ENCOUNTER
Pt says new medication is making his feet swell. Hurts when he walks. Says he thinks the med name is Norvasc. Says just started it about a week ago. Wants to know what he should do? Stop med get new med?

## 2020-01-02 NOTE — PROGRESS NOTES
Please let him know that his diabetes is not controlled well with his A1C measuring 12.3. It needs to be under 7! He is showing signs of microalbuminuria. There is strain on his renal function. TFTs are normal. He is mildly anemic with a hemoglobin measuring 12. 1. MCV is normal. RDW is normal. The remainder of his CBC is unremarkable. I am ordering metformin 1000mg bid. He will need meter supplies and BG checks. We will need to determine how insulin resistant he is. There's a good chance he needs insulin to prevent further damage to his kidneys. Please schedule him with Aubree Quiñones, our clinical pharmacist, this month to assist with his DM management.     Dr. Maryam Nguyen  Internists of [address]  Phone: [phone]  Fax: [phone]

## 2020-01-03 ENCOUNTER — OFFICE VISIT (OUTPATIENT)
Dept: INTERNAL MEDICINE CLINIC | Age: 35
End: 2020-01-03

## 2020-01-03 VITALS — HEART RATE: 106 BPM | DIASTOLIC BLOOD PRESSURE: 97 MMHG | SYSTOLIC BLOOD PRESSURE: 151 MMHG

## 2020-01-03 DIAGNOSIS — E11.9 TYPE 2 DIABETES MELLITUS WITHOUT COMPLICATION, WITHOUT LONG-TERM CURRENT USE OF INSULIN (HCC): Primary | ICD-10-CM

## 2020-01-03 RX ORDER — FUROSEMIDE 20 MG/1
20 TABLET ORAL DAILY
Qty: 30 TAB | Refills: 2 | Status: SHIPPED | OUTPATIENT
Start: 2020-01-03 | End: 2020-03-16 | Stop reason: SDUPTHER

## 2020-01-03 RX ORDER — FLUTICASONE PROPIONATE 50 MCG
SPRAY, SUSPENSION (ML) NASAL
COMMUNITY
Start: 2020-01-02 | End: 2020-01-10 | Stop reason: ALTCHOICE

## 2020-01-03 RX ORDER — BENZONATATE 100 MG/1
1 CAPSULE ORAL
COMMUNITY
Start: 2020-01-02 | End: 2020-01-10 | Stop reason: ALTCHOICE

## 2020-01-03 NOTE — Clinical Note
Hi Dr. Gonzalo Ram have attached Mr. Marshall's note. As we discussed, I started him on Lasix 20 mg daily and Trulicity 2.37 mg every 7 days. I will see him in 1 week for BG and BP follow up and we will consider starting Lantus at this visit. Please let me know if you have any additional recommendations for him. Wilfredo Blum, PharmDClinical Pharmacist Specialist

## 2020-01-03 NOTE — PROGRESS NOTES
CC:  Diabetes management    S/O: Jabari Torres is a 27 y.o. male referred by Dr. Jaxson Tsang for diabetes management. Current diabetes regimen consists of the following: Metformin 1 gram BID. -States he was on Humalog and Lantus 2 years ago - when he first got out of the hospital after his amputation    States he had been on amlodipine 5 mg for around a week and noticed that he had swelling in his legs and feet; states every step when walking was painful from foot swelling. He states last dose of amlodipine was yesterday. He states this morning he took Cozaar 150 mg today.  -Patient still has +2 bilateral pitting edema in ankles today    Patient reports the following signs/symptoms of diabetes: blurry vision. Patient states sometimes he feels faint when he takes the metformin in the morning and doesn't eat for a while. States this has happened once or twice. Patient is not currently checking blood sugars. Most recent A1C was 12.3 % on 12/18/19. A typical days food intake is as follows:  Breakfast: Scrambled eggs and turkey sausages; states rarely has waffles or pancakes; sometimes oatmeal or grits  Lunch: Sometimes turkey burger or steak, string beans, sometimes burger with no bread, broccoli, cauliflower  Dinner: Chicken fajitas with wheat wrap; sometimes steak or grilled chicken, turkey, vegetables  Beverages: Mainly water and sweet tea (small cup)  Snacks: Jello sugar-free, sugar-free chocolate pudding, grapes, bananas  -States he has been trying to cut down on his carbs  -States he was curious about some low calorie diets and going vegetarian    Exercise: 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   Patient reports adverse effects from their medications - swelling in feet/legs from amlodipine.  -States last time he missed a dose was 2 weeks ago    Past Medical History:   Diagnosis Date    Diabetes ([address]     Hypertension      Past Surgical History:   Procedure Laterality Date    HX AMPUTATION TOE       No family history on file. Social History     Socioeconomic History    Marital status:      Spouse name: Not on file    Number of children: Not on file    Years of education: Not on file    Highest education level: Not on file   Tobacco Use    Smoking status: Never Smoker    Smokeless tobacco: Never Used   Substance and Sexual Activity    Alcohol use: No    Drug use: No    Sexual activity: Yes     Partners: Female     No Known Allergies  Current Outpatient Medications   Medication Sig    losartan (COZAAR) 100 mg tablet Take 1 Tab by mouth daily.  metFORMIN (GLUCOPHAGE) 1,000 mg tablet Take 1 Tab by mouth two (2) times daily (with meals). Indications: type 2 diabetes mellitus    atorvastatin (LIPITOR) 40 mg tablet Take 1 Tab by mouth daily.  cyanocobalamin (VITAMIN B12) 500 mcg tablet Take 1 Tab by mouth daily.  folic acid 298 mcg tablet Take 1 Tab by mouth daily. No current facility-administered medications for this visit. There were no vitals taken for this visit. Data reviewed:  Lab Results   Component Value Date/Time    Hemoglobin A1c 12.3 (H) 12/18/2019 02:00 PM     Lab Results   Component Value Date/Time    Cholesterol, total 155 12/14/2018 08:38 AM    HDL Cholesterol 32 (L) 12/14/2018 08:38 AM    LDL, calculated 92 12/14/2018 08:38 AM    VLDL, calculated 31 12/14/2018 08:38 AM    Triglyceride 154 (H) 12/14/2018 08:38 AM     Lab Results   Component Value Date/Time    ALT (SGPT) 15 12/18/2019 02:00 PM    AST (SGOT) 8 12/18/2019 02:00 PM    Alk.  phosphatase 72 12/18/2019 02:00 PM    Bilirubin, total 0.2 12/18/2019 02:00 PM     Lab Results   Component Value Date/Time    Creatinine 1.23 12/18/2019 02:00 PM     Lab Results   Component Value Date/Time    Microalb/Creat ratio (ug/mg creat.) 3,127.1 (H) 12/18/2019 02:00 PM       Screenings:  Last eye exam was 1/29/19  Last foot exam was 12/18/19    Assessment/Plan:     1. Diabetes: uncontrolled with goal A1C at least <7%. Blood glucose readings: not currently checking BG at home. A. Medications:  Stressed importance of medication adherence on overall diabetes control as well as with preventing complications of diabetes. Reviewed symptoms of hypoglycemia and how to treat. Instructed patient to monitor blood sugars daily (fasting BG) and whenever he is not feeling well and call the office if: he has persistently elevated BG or if he has trouble getting Trulicity Rx filled at pharmacy  -Started Trulicity 2.97 mg every 7 days - completed teaching about administration and storage using demo pen  -Discussed options for diabetes management including long acting insulin, Jardiance   -Discussed that we will likely start long acting insulin at next visit given high BG - discussed that patient must get BG monitor and be checking BG at home; patient states he is familiar with insulin as he has been on this in the past  -Discussed affordable options (Relion, True Metrix) for BG monitors since patient states his insurance did not previously cover meter or test strips  -Provided patient with BG log to record BG numbers    B. Diet/lifestyle:  Reinforced importance of regular activity/exercise to help improve insulin resistance and reviewed food labels/carbohydrates/general carb guidelines for meals (45-60 g) and snacks (goal of 15g).   Patient education materials were provided and reviewed with the patient.  -Discussed that diets including ketogenic diet or low calorie diet would be dangerous for patient, especially without supervision from nutritionist  -Discussed that if he wants to transition to vegetarian diet, he should consult with nutritionist during process to ensure safety    C. Screenings/Prevention:  Vaccinations: Patient is eligible for the following vaccinations: none  Dilated eye exam (recommended at least every 2 years or annually if retinopathy present): next due 21  Albumin-to creatinine ratio (recommended annually): next due 20  Foot Exam (recommended annually): next due 20    2. Hypertension:  Blood pressure is not at goal of < 140/90 mm Hg per the ADA guidelines. Emphasized the importance of regular physical activity, restricting salt in diet and weight maintenance/loss on overall blood pressure control.  -Discussed that patient should only be taking Cozaar (losartan) 100 mg daily each day  -Started furosemide 20 mg daily per Dr. Yadira Robles  -Will recheck BG and edema in legs at visit in 1 week  -Discussed that patient should get home automatic BP cuff at home and check BP daily    3. Hyperlipidemia:  LDL currently is <100 mg/dL (LDL 92 on 19). Current lipid treatment guidelines recommend at least moderate-intensity statin doses to decrease ASCVD risk. 4.  Medication reconciliation was completed during the visit. There are no discontinued medications. Patient verbalized understanding of the information presented and all of the patients questions were answered. AVS was handed to the patient and information reviewed. Patient advised to call the office with any additional questions or concerns. Follow-up: 1 week office viist.  Notification of recommendations will be sent to Dr. Torrie Guzman MD for review.     Thank you for the consult,  Nica Crowder, [facility]    Time spent with the patient:  60 mins  Time spent documentin mins

## 2020-01-03 NOTE — PATIENT INSTRUCTIONS
Your Visit Summary:    1.) You can try the Relion brand glucometer at [facility] or the True Metric brand glucometer at [facility] for affordable glucometer, test strips, and lancets. 2.) Check your blood sugar first thing in the morning every day. Bring your meter/log to all future visits. 3.) Make sure you are taking Cozaar (losartan) 100 mg daily. 4.) Start taking furosemide 20 mg daily every morning.    5.) Get a blood pressure monitor and check your blood pressure once a day. Your blood sugar goals:  - Fasting (first thing in the morning)  blood sugar: 80 - 130   - 1 to 2 hours after a meal: less than 180     When you experience symptoms of low blood sugar (example: less than 70):  - Confirm low reading by checking your blood sugar.   - Then treat with 15 grams of carbohydrates (one-half cup of juice or regular soda, or 4-5 glucose tablets). - Wait 15 minutes to recheck blood sugar.   - Then eat a protein containing meal/snack to prevent another low blood sugar episode. (example: peanut butter + crackers)    Other recommendations:  - Schedule an annual eye exam.  - Check your feet daily for any signs of open wounds, cuts, or sores. - Given your risk factors, the following vaccines are recommended: annual flu shot, age-based pneumococcal vaccines (Pneumovax, Prevnar 13). In addition to taking your medications as directed, improving your blood sugar involves modifying your nutrition and maximizing the amount of physical activity. Nutrition:  - When reviewing a nutrition label, focus on the serving size, total calories, fat (and type of fats), total carbohydrates, sugar (and amount of added sugar), amount of fiber (good for your digestive), and amount of protein. Refer to your nutrition label guide for more information.  - For a meal : max 45 - 60 grams of carbohydrates  - For a snack: max 15 grams of carbohydrates  - Reduce amount of saturated and trans fat.   Consider more unsaturated fat options as they are better for your heart health. - Have at least 1 serving of lean fat protein with each meal.    - Increase fiber intake slowly to prevent constipation.   - Substitute fruit juices for the whole fruit    Physical Activity:  - Aim for 30 minutes of consistent, moderately intensive, physical activity a day for 5 days or an average of 150 minutes per week. - Start slow, increase as tolerated. For example: Walk every day, working up to 30 minutes of brisk walking, 5 days a week--or split the 30 minutes into two 15-minute or three 10-minute walks. - If you sit for a long time, get up and move/stretch every 90 minutes. Please call [phone] for any medication or diabetes questions.     Faye Martell, Clinical Pharmacist

## 2020-01-05 PROBLEM — N52.9 ERECTILE DYSFUNCTION: Status: ACTIVE | Noted: 2020-01-05

## 2020-01-05 PROBLEM — R06.83 SNORING: Status: ACTIVE | Noted: 2020-01-05

## 2020-01-05 PROBLEM — D64.9 ANEMIA: Status: ACTIVE | Noted: 2020-01-05

## 2020-01-10 ENCOUNTER — OFFICE VISIT (OUTPATIENT)
Dept: INTERNAL MEDICINE CLINIC | Age: 35
End: 2020-01-10

## 2020-01-10 VITALS — SYSTOLIC BLOOD PRESSURE: 138 MMHG | DIASTOLIC BLOOD PRESSURE: 86 MMHG | HEART RATE: 95 BPM

## 2020-01-10 DIAGNOSIS — E11.9 TYPE 2 DIABETES MELLITUS WITHOUT COMPLICATION, WITHOUT LONG-TERM CURRENT USE OF INSULIN (HCC): Primary | ICD-10-CM

## 2020-01-10 RX ORDER — GLIPIZIDE 5 MG/1
5 TABLET, FILM COATED, EXTENDED RELEASE ORAL DAILY
Qty: 30 TAB | Refills: 1 | Status: SHIPPED | OUTPATIENT
Start: 2020-01-10 | End: 2020-01-31

## 2020-01-10 RX ORDER — LANCETS
EACH MISCELLANEOUS
Qty: 100 EACH | Refills: 3 | Status: SHIPPED | OUTPATIENT
Start: 2020-01-10 | End: 2021-08-18

## 2020-01-10 RX ORDER — INSULIN PUMP SYRINGE, 3 ML
EACH MISCELLANEOUS
Qty: 1 KIT | Refills: 0 | Status: SHIPPED | OUTPATIENT
Start: 2020-01-10 | End: 2022-06-30

## 2020-01-10 NOTE — PROGRESS NOTES
CC:  Diabetes management    S/O: Milady Carcamo is a 27 y.o. male referred by Dr. Rajiv Sandoval for diabetes management. Current diabetes regimen consists of the following: Trulicity 2.11 mg once a week, metformin 1 gram BID. -States he wasn't able to  Trulicity from pharmacy; states Trulicity was $256 copay    Patient reports the following signs/symptoms of diabetes: polyuria (but on Lasix), some blurry vision. Patient denies recent hypoglycemic symptoms  -States he has appointment with podiatrist on Tuesday    Patient hasn't been able to check BG since he states meter was broken. Most recent A1C was 12.3 % on 12/18/19. A typical days food intake is as follows:  Breakfast: States eats small cup of grits or oatmeal; sometimes eats some grapes (11-12 grapes, about half a cup)  Lunch: Veggie meatballs with brown rice; turkey burger with brown rice  Dinner: Chicken noodle soup; veggie burgers with wheat wrap  Beverages: Water  Snacks: Pistachios, almonds, a few pieces of grapefruit  -States he has improved diet since last visit and is trying to eat heathier and avoid sweets; states wife helps his manage his diet    Exercise: States hard because of his history of foot amputation; states he goes up and down steps at apartment and at job    Patient denies adherence with His medications. Patient denies adverse effects from their medications.  -Did not  trulicity due to cost; states he is adherent with rest of his   -States his leg edema has improved on furosemide    Past Medical History:   Diagnosis Date    Diabetes ([address]     Hypertension      Past Surgical History:   Procedure Laterality Date    HX AMPUTATION TOE       No family history on file.   Social History     Socioeconomic History    Marital status:      Spouse name: Not on file    Number of children: Not on file    Years of education: Not on file    Highest education level: Not on file   Tobacco Use    Smoking status: Never Smoker  Smokeless tobacco: Never Used   Substance and Sexual Activity    Alcohol use: No    Drug use: No    Sexual activity: Yes     Partners: Female     Allergies   Allergen Reactions    Amlodipine Swelling     Had swelling in his legs and feet - states was painful to walk     Current Outpatient Medications   Medication Sig    benzonatate (TESSALON) 100 mg capsule Take 1 Cap by mouth three (3) times daily as needed for Cough.  fluticasone propionate (FLONASE) 50 mcg/actuation nasal spray 1 spray in each nostril once a day    furosemide (LASIX) 20 mg tablet Take 1 Tab by mouth daily.  dulaglutide (TRULICITY) 1.74 UH/2.5 mL sub-q pen 0.5 mL by SubCUTAneous route every seven (7) days.  losartan (COZAAR) 100 mg tablet Take 1 Tab by mouth daily.  metFORMIN (GLUCOPHAGE) 1,000 mg tablet Take 1 Tab by mouth two (2) times daily (with meals). Indications: type 2 diabetes mellitus    atorvastatin (LIPITOR) 40 mg tablet Take 1 Tab by mouth daily.  cyanocobalamin (VITAMIN B12) 500 mcg tablet Take 1 Tab by mouth daily.  folic acid 588 mcg tablet Take 1 Tab by mouth daily. No current facility-administered medications for this visit. There were no vitals taken for this visit. Data reviewed:  Lab Results   Component Value Date/Time    Hemoglobin A1c 12.3 (H) 12/18/2019 02:00 PM     Lab Results   Component Value Date/Time    Cholesterol, total 155 12/14/2018 08:38 AM    HDL Cholesterol 32 (L) 12/14/2018 08:38 AM    LDL, calculated 92 12/14/2018 08:38 AM    VLDL, calculated 31 12/14/2018 08:38 AM    Triglyceride 154 (H) 12/14/2018 08:38 AM     Lab Results   Component Value Date/Time    ALT (SGPT) 15 12/18/2019 02:00 PM    AST (SGOT) 8 12/18/2019 02:00 PM    Alk.  phosphatase 72 12/18/2019 02:00 PM    Bilirubin, total 0.2 12/18/2019 02:00 PM     Lab Results   Component Value Date/Time    Creatinine 1.23 12/18/2019 02:00 PM     Lab Results   Component Value Date/Time    Microalb/Creat ratio (ug/mg creat.) 3,127.1 (H) 12/18/2019 02:00 PM       Screenings:  Last eye exam was 1/29/19  Last foot exam was 1/5/2020    Assessment/Plan:     1. Diabetes: uncontrolled with goal A1C at least <7%. Blood glucose readings: not currently checking BG at home    A. Medications:  Stressed importance of medication adherence on overall diabetes control as well as with preventing complications of diabetes. Reviewed symptoms of hypoglycemia and how to treat. Instructed patient to  glucometer from his pharmacy and check BG at least once per day. Instructed patient to call the office if glucometer is not covered or if copay is unaffordable.  -Sent prescription for glucometer kit, lancets, and test strips to patient's preferred pharmacy [facility]  -Discussed other affordable options for glucometers including the ReliOn meter which is OTC at [facility] and the TrueMetrics meter which is OTC at [facility]  -Started patient on glipizide XL 5 mg daily since patient prefers oral medications and has not been compliant with checking BG at home   -Discussed that if BG are high at next visit, we will have to start him on long acting insulin such as Lantus; reviewed administration and storage of Lantus pen using demo device   -Will discuss plan with Dr. Kate Burt and call his [address] to determine if there are other GLP-1 agents more affordable than Trulicholden     B. Diet/lifestyle:  Reinforced importance of regular activity/exercise to help improve insulin resistance and reviewed food labels/carbohydrates/general carb guidelines for meals (45-60 g) and snacks (goal of 15g).   Patient education materials were provided and reviewed with the patient.  -Patient has made good dietary changes and states he is feeling better; encouraged him to continue good dietary changes and remember to use carb counting to help with carb portions    C. Screenings/Prevention:  Vaccinations: Patient is eligible for the following vaccinations: none  Dilated eye exam (recommended at least every 2 years or annually if retinopathy present): next due 1/29/21  Albumin-to creatinine ratio (recommended annually): next due 12/18/2020  Foot Exam (recommended annually): next due 1/5/21    2. Hypertension:  Blood pressure is not at goal of < 140/90 mm Hg per the ADA guidelines (last /97 on 1/3/2020). Emphasized the importance of regular physical activity, restricting salt in diet and weight maintenance/loss on overall blood pressure control.  -Currently on losartan 100 mg daily and furosemide 20 mg daily  -BP today improved at 138/86 HR 95 using automatic BP cuff  -Instructed patient to continue current regimen    3. Hyperlipidemia:  LDL currently is <100 mg/dL (LDL 92 on 12/14/18). Current lipid treatment guidelines recommend at least moderate-intensity statin doses to decrease ASCVD risk. 4.  Medication reconciliation was completed during the visit. There are no discontinued medications. Patient verbalized understanding of the information presented and all of the patients questions were answered. AVS was handed to the patient and information reviewed. Patient advised to call the office with any additional questions or concerns. Follow-up: 2 weeks. Notification of recommendations will be sent to Dr. Yves Haque MD for review.     Thank you for the consult,  Braxton Desir [facility]    Time spent with the patient: 40 mins  Time spent documenting: 15 mins

## 2020-01-10 NOTE — Clinical Note
Hi Dr. Halley Ernandez have attached my note for Mr. [place] Ant. He has been doing well with eating healthy and improving his diet, but he has still not been checking BG and states he did not  Trulicity since it was expensive. I started him on glipizide XL for now since I don't feel safe starting insulin without him checking BG. I reviewed the consequences of high BG in detail with him. I sent prescriptions for a glucometer kit to his pharmacy. He knows we will start Lantus at next visit if his BG are high. I will call his pharmacy to see if they know which other agents would be affordable for him. I am following up with him in 2 weeks. Please let me know if you have any additional recommendations for him. Meg Miller, PharmDClinical Pharmacist Specialist

## 2020-01-10 NOTE — PATIENT INSTRUCTIONS
Your Visit Summary:    1.) Please  your blood sugar monitor from [address] Call if this is not affordable. Other options include the [employer] meter which you can buy over the counter. 2.) Please check and document your blood sugar first thing in the morning (fasting) every day and whenever you are not feeling well. Bring your meter/log to all future visits. 3.) Keep up the good work with your diet changes. 4.) Continue your metformin 1,000 mg twice a day. Please  your glipizide. Take 1 tablet daily with breakfast.    Your blood sugar goals:  - Fasting (first thing in the morning)  blood sugar: 80 - 130   - 1 to 2 hours after a meal: less than 180     When you experience symptoms of low blood sugar (example: less than 70):  - Confirm low reading by checking your blood sugar.   - Then treat with 15 grams of carbohydrates (one-half cup of juice or regular soda, or 4-5 glucose tablets). - Wait 15 minutes to recheck blood sugar.   - Then eat a protein containing meal/snack to prevent another low blood sugar episode. (example: peanut butter + crackers)    Other recommendations:  - Schedule an annual eye exam.  - Check your feet daily for any signs of open wounds, cuts, or sores. - Given your risk factors, the following vaccines are recommended: annual flu shot, age-based pneumococcal vaccines (Pneumovax, Prevnar 13). In addition to taking your medications as directed, improving your blood sugar involves modifying your nutrition and maximizing the amount of physical activity. Nutrition:  - When reviewing a nutrition label, focus on the serving size, total calories, fat (and type of fats), total carbohydrates, sugar (and amount of added sugar), amount of fiber (good for your digestive), and amount of protein.   Refer to your nutrition label guide for more information.  - For a meal : max 45 - 60 grams of carbohydrates  - For a snack: max 15 grams of carbohydrates  - Reduce amount of saturated and trans fat. Consider more unsaturated fat options as they are better for your heart health. - Have at least 1 serving of lean fat protein with each meal.    - Increase fiber intake slowly to prevent constipation.   - Substitute fruit juices for the whole fruit    Physical Activity:  - Aim for 30 minutes of consistent, moderately intensive, physical activity a day for 5 days or an average of 150 minutes per week. - Start slow, increase as tolerated. For example: Walk every day, working up to 30 minutes of brisk walking, 5 days a week--or split the 30 minutes into two 15-minute or three 10-minute walks. - If you sit for a long time, get up and move/stretch every 90 minutes. Please call [phone] for any medication or diabetes questions.     Jose Guadalupe Alvares, Clinical Pharmacist

## 2020-01-24 ENCOUNTER — OFFICE VISIT (OUTPATIENT)
Dept: INTERNAL MEDICINE CLINIC | Age: 35
End: 2020-01-24

## 2020-01-24 VITALS — SYSTOLIC BLOOD PRESSURE: 149 MMHG | HEART RATE: 102 BPM | DIASTOLIC BLOOD PRESSURE: 89 MMHG

## 2020-01-24 DIAGNOSIS — E11.9 TYPE 2 DIABETES MELLITUS WITHOUT COMPLICATION, WITHOUT LONG-TERM CURRENT USE OF INSULIN (HCC): Primary | ICD-10-CM

## 2020-01-24 LAB — GLUCOSE POC: 168 MG/DL

## 2020-01-24 RX ORDER — PEN NEEDLE, DIABETIC 31 GX3/16"
NEEDLE, DISPOSABLE MISCELLANEOUS
Qty: 50 PEN NEEDLE | Refills: 1 | Status: SHIPPED | OUTPATIENT
Start: 2020-01-24 | End: 2020-01-24

## 2020-01-24 RX ORDER — INSULIN GLARGINE 100 [IU]/ML
10 INJECTION, SOLUTION SUBCUTANEOUS DAILY
Qty: 2 PEN | Refills: 1 | Status: SHIPPED | OUTPATIENT
Start: 2020-01-24 | End: 2020-01-24

## 2020-01-24 NOTE — PROGRESS NOTES
CC:  Diabetes management    S/O: Avinash Gonzalez is a 27 y.o. male referred by Dr. Eddie James for diabetes management. Current diabetes regimen consists of the following: Glipizide XL 5 mg daily, metformin 1 gram BID. States he saw podiatrist on 1/14/2020 and was told he has an ulcer on his left foot that is not infected. Was not started on antibiotics per his report but was given instructions for wound care. He states he was told he needs to follow up in wound care clinic for skin grafts in the coming weeks. Patient denies the following signs/symptoms of diabetes polyuria, polydispia, polyphagia, tingling in fingers and toes, blurry vision. Patient denies recent hypoglycemic symptoms; denies any BG <70  -States he can sleep through the night without getting up to urinate - improved after he started the glipizide    Patient checks home BG 2-3 times daily with BG ranging 80s-160s per BG log.   Most recent A1C was 12.3 % on 12/18/19.  -Brought BG log, but forgot glucometer today  -States he checks BG 3 times daily during weekends   -During weekdays, checks BG before breakfast and before dinner  -Fasting BG -   -Before lunch (3 readings per BG log) -   -Before dinner -     Clinic nurse checked patient's POC BG today in clinic (almost 2 hours after meal) - 168    A typical days food intake is as follows:  Breakfast: Sometimes oatmeal or grits, protein pack (with chicken, cheese, raisins or almonds)  Lunch: [place] ([facility]) chili; turkey burger with no bun, organic beef or veggie burger; string beans or vegetables and small portion of brown rice  Dinner: Spinach turkey wrap from [place]; [place] chicken; chicken noodle or [place] wedding soup (progressive); mainly vegetables (string beans or broccoli), brown rice  Beverages: Water or crystal light  Snacks: Nutrigrain bar or trail mix (nuts, chocolate, raisins), sometimes grapes (around 10 pieces), sometimes half a banana    Exercise: States walking every day; around a half hour every day    Patient denies adherence with His medications. Patient denies adverse effects from their medications. Past Medical History:   Diagnosis Date    Diabetes ([address]     Hypertension      Past Surgical History:   Procedure Laterality Date    HX AMPUTATION TOE       No family history on file. Social History     Socioeconomic History    Marital status:      Spouse name: Not on file    Number of children: Not on file    Years of education: Not on file    Highest education level: Not on file   Tobacco Use    Smoking status: Never Smoker    Smokeless tobacco: Never Used   Substance and Sexual Activity    Alcohol use: No    Drug use: No    Sexual activity: Yes     Partners: Female     Allergies   Allergen Reactions    Amlodipine Swelling     Had swelling in his legs and feet - states was painful to walk     Current Outpatient Medications   Medication Sig    Blood-Glucose Meter monitoring kit Use to check blood sugar 2 times per day.  glucose blood VI test strips (ASCENSIA AUTODISC VI, ONE TOUCH ULTRA TEST VI) strip Use to check blood sugars twice a day.  lancets misc Use to check blood sugar twice a day    glipiZIDE SR (GLUCOTROL XL) 5 mg CR tablet Take 1 Tab by mouth daily.  furosemide (LASIX) 20 mg tablet Take 1 Tab by mouth daily.  dulaglutide (TRULICITY) 6.63 FY/5.7 mL sub-q pen 0.5 mL by SubCUTAneous route every seven (7) days.  losartan (COZAAR) 100 mg tablet Take 1 Tab by mouth daily.  metFORMIN (GLUCOPHAGE) 1,000 mg tablet Take 1 Tab by mouth two (2) times daily (with meals). Indications: type 2 diabetes mellitus    atorvastatin (LIPITOR) 40 mg tablet Take 1 Tab by mouth daily.  cyanocobalamin (VITAMIN B12) 500 mcg tablet Take 1 Tab by mouth daily.  folic acid 940 mcg tablet Take 1 Tab by mouth daily. No current facility-administered medications for this visit. There were no vitals taken for this visit.     Data reviewed:  Lab Results   Component Value Date/Time    Hemoglobin A1c 12.3 (H) 12/18/2019 02:00 PM     Lab Results   Component Value Date/Time    Cholesterol, total 155 12/14/2018 08:38 AM    HDL Cholesterol 32 (L) 12/14/2018 08:38 AM    LDL, calculated 92 12/14/2018 08:38 AM    VLDL, calculated 31 12/14/2018 08:38 AM    Triglyceride 154 (H) 12/14/2018 08:38 AM     Lab Results   Component Value Date/Time    ALT (SGPT) 15 12/18/2019 02:00 PM    AST (SGOT) 8 12/18/2019 02:00 PM    Alk. phosphatase 72 12/18/2019 02:00 PM    Bilirubin, total 0.2 12/18/2019 02:00 PM     Lab Results   Component Value Date/Time    Creatinine 1.23 12/18/2019 02:00 PM     Lab Results   Component Value Date/Time    Microalb/Creat ratio (ug/mg creat.) 3,127.1 (H) 12/18/2019 02:00 PM     Screenings:  Last eye exam was 12/17/19  Last foot exam was 1/5/2020    Assessment/Plan:     1. Diabetes: uncontrolled with goal A1C at least <7%. Blood glucose readings: brought BG log but not glucometer to visit; highest BG 120s-160s per patient's BG log sheet    A. Medications: Stressed importance of medication adherence on overall diabetes control as well as with preventing complications of diabetes. Reviewed symptoms of hypoglycemia and how to treat. Instructed patient to continue checking BG 2-3 times per day and call office if he has BG <70 or in 200s and if he has symptoms of hypoglycemia  -Instructed patient to continue metformin 1 gram BID and glipizide XL 5 mg daily  -Reiterated importance of patient bringing glucometer to appointments   -Follow up on BG at next PCP visit, as patient may need to start long-acting insulin for BG control  -Discussed with patient importance of us getting BG controlled given ulcer on his foot    B. Diet/lifestyle:  Reinforced importance of regular activity/exercise to help improve insulin resistance and reviewed food labels/carbohydrates/general carb guidelines for meals (45-60 g) and snacks (goal of 15g).   -Patient has made good dietary changes and states he is feeling better; encouraged him to continue good dietary changes and remember to use carb counting to help with carb portions    C. Screenings/Prevention:  Vaccinations: Patient is eligible for the following vaccinations: none  Dilated eye exam (recommended at least every 2 years or annually if retinopathy present): next due 12/17/21  Albumin-to creatinine ratio (recommended annually): next due 12/18/2020  Foot Exam (recommended annually): last foot exam 1/14/2020   -States podiatrist also did PVL test in arms and legs    2. Hypertension:  Blood pressure is not at goal of < 140/90 mm Hg per the ADA guidelines (/89 today in office). Emphasized the importance of regular physical activity, restricting salt in diet and weight maintenance/loss on overall blood pressure control.  -Currently on losartan 100 mg daily and furosemide 20 mg daily  -Instructed patient to continue current regimen, recheck BP at next visit    3. Hyperlipidemia:  LDL currently is <100 mg/dL (LDL 92 on 12/14/18). Current lipid treatment guidelines recommend at least moderate-intensity statin doses to decrease ASCVD risk.  -Currently on atorvastatin 40 mg daily    4. Medication reconciliation was completed during the visit. There are no discontinued medications. Patient verbalized understanding of the information presented and all of the patients questions were answered. AVS was handed to the patient and information reviewed. Patient advised to call the office with any additional questions or concerns. Follow-up: 6 weeks per patient's request due to upcoming skin grafts. Notification of recommendations will be sent to Dr. Jorgito Holman MD for review.     Thank you for the consult,  Justice Closs, [facility]    Time spent with the patient: 50 mins  Time spent documenting: 15 mins

## 2020-01-24 NOTE — Clinical Note
Hi Dr. Kierra Montanez have attached my note for . [place]. I apologize for the delay. I am seeing him for followup again the first week of March. Please let me know if you have any additional recommendations for him. Gianna Disla, PharmDClinical Pharmacist Specialist

## 2020-01-24 NOTE — PATIENT INSTRUCTIONS
Your Visit Summary:     1.) Check and document your blood sugar first thing in the morning (fasting) and before bedtime. Also check whenever you are not feeling well. Bring your meter/log to all future visits. 2.) Continue your glipizide XL and your metformin. Do not start Lantus insulin - we are checking the copay cost for you.    3.) Consider checking home blood pressures. Your blood sugar goals:  - Fasting (first thing in the morning)  blood sugar: 80 - 130   - 1 to 2 hours after a meal: less than 180     When you experience symptoms of low blood sugar (example: less than 70):  - Confirm low reading by checking your blood sugar.   - Then treat with 15 grams of carbohydrates (one-half cup of juice or regular soda, or 4-5 glucose tablets). - Wait 15 minutes to recheck blood sugar.   - Then eat a protein containing meal/snack to prevent another low blood sugar episode. (example: peanut butter + crackers)    Other recommendations:  - Schedule an annual eye exam.  - Check your feet daily for any signs of open wounds, cuts, or sores. - Given your risk factors, the following vaccines are recommended: annual flu shot, age-based pneumococcal vaccines (Pneumovax, Prevnar 13). In addition to taking your medications as directed, improving your blood sugar involves modifying your nutrition and maximizing the amount of physical activity. Nutrition:  - When reviewing a nutrition label, focus on the serving size, total calories, fat (and type of fats), total carbohydrates, sugar (and amount of added sugar), amount of fiber (good for your digestive), and amount of protein. Refer to your nutrition label guide for more information.  - For a meal : max 45 - 60 grams of carbohydrates  - For a snack: max 15 grams of carbohydrates  - Reduce amount of saturated and trans fat. Consider more unsaturated fat options as they are better for your heart health.    - Have at least 1 serving of lean fat protein with each meal.    - Increase fiber intake slowly to prevent constipation.   - Substitute fruit juices for the whole fruit    Physical Activity:  - Aim for 30 minutes of consistent, moderately intensive, physical activity a day for 5 days or an average of 150 minutes per week. - Start slow, increase as tolerated. For example: Walk every day, working up to 30 minutes of brisk walking, 5 days a week--or split the 30 minutes into two 15-minute or three 10-minute walks. - If you sit for a long time, get up and move/stretch every 90 minutes. Please call [phone] for any medication or diabetes questions.     Nora Chauhan Clinical Pharmacist

## 2020-01-31 ENCOUNTER — OFFICE VISIT (OUTPATIENT)
Dept: INTERNAL MEDICINE CLINIC | Age: 35
End: 2020-01-31

## 2020-01-31 VITALS
DIASTOLIC BLOOD PRESSURE: 82 MMHG | TEMPERATURE: 98.8 F | HEART RATE: 91 BPM | BODY MASS INDEX: 45.28 KG/M2 | RESPIRATION RATE: 12 BRPM | WEIGHT: 298.8 LBS | OXYGEN SATURATION: 100 % | SYSTOLIC BLOOD PRESSURE: 132 MMHG | HEIGHT: 68 IN

## 2020-01-31 DIAGNOSIS — I10 ESSENTIAL HYPERTENSION: ICD-10-CM

## 2020-01-31 DIAGNOSIS — D64.9 ANEMIA, UNSPECIFIED TYPE: ICD-10-CM

## 2020-01-31 DIAGNOSIS — E78.5 HYPERLIPIDEMIA LDL GOAL <70: ICD-10-CM

## 2020-01-31 DIAGNOSIS — E11.37X9 CONTROLLED TYPE 2 DIABETES MELLITUS WITH DIABETIC MACULAR EDEMA RESOLVED AFTER TREATMENT, WITHOUT LONG-TERM CURRENT USE OF INSULIN, UNSPECIFIED LATERALITY (HCC): Primary | ICD-10-CM

## 2020-01-31 LAB — GLUCOSE POC: 200 MG/DL

## 2020-01-31 RX ORDER — GLIPIZIDE 10 MG/1
10 TABLET, FILM COATED, EXTENDED RELEASE ORAL DAILY
Qty: 30 TAB | Refills: 5 | Status: SHIPPED | OUTPATIENT
Start: 2020-01-31 | End: 2020-07-23

## 2020-01-31 RX ORDER — FUROSEMIDE 40 MG/1
40 TABLET ORAL DAILY
Qty: 30 TAB | Refills: 5 | Status: CANCELLED | OUTPATIENT
Start: 2020-01-31

## 2020-01-31 NOTE — PATIENT INSTRUCTIONS
Health Maintenance Due Topic Date Due  
 LIPID PANEL Q1  12/14/2019 Learning About Tests When You Have Diabetes Why do you need regular diabetes tests? Diabetes can be hard on your body if it's not well controlled. But having tests on a regular schedule can help you and your doctor find problems early, when it's easier to start managing them. What tests do you need? The tests you may have, how often you should have them, and the goals of the tests are: 
A1c blood test. This test shows the average level of blood sugar over the past 2 to 3 months. It helps your doctor see whether blood sugar levels have been staying within your target range. · How often: Every 3 to 6 months · Goal: A blood sugar level in your target range Blood pressure test: This test measures the pressure of blood flow in the arteries. Controlling blood pressure can help prevent damage to nerves and blood vessels. · How often: Every 3 to 6 months · Goal: A blood pressure level in your target range Cholesterol test: This test measures the amount of a type of fat in the blood. It is common for people with diabetes to also have high cholesterol. Too much cholesterol in the blood can build up inside the blood vessels and raise the risk for heart attack and stroke. · How often: At the time of your diabetes diagnosis, and as often as your doctor recommends after that · Goal: A cholesterol level in your target range Albumin-creatinine ratio test: This test checks for kidney damage by looking for the protein albumin (say \"al-BYOO-roscoe\") in the urine. Albumin is normally found in the blood. Kidney damage can let small amounts of it (microalbumin) leak into the urine. · How often: Once a year · Goal: No protein in the urine Blood creatinine test/estimated glomerular filtration (eGFR):  The blood creatinine (say \"cdwo-GZ-uw-neen\") level shows how well your kidneys are working. Creatinine is a waste product that muscles release into the blood. Blood creatinine is used to estimate the glomerular filtration rate. A high level of creatinine and/or a low eGFR may mean your kidneys are not working as well as they should. · How often: Once a year · Goal: Normal level of creatinine in the blood. The eGFR goal is greater than 60 mL/min/1.73 m². Complete foot exam: The doctor checks for foot sores and whether any sensation has been lost. 
· How often: Once a year · Goal: Healthy feet with no foot ulcers or loss of feeling Dental exam and cleaning: The dentist checks for gum disease and tooth decay. People with high blood sugar are more likely to have these problems. · How often: Every 6 months · Goal: Healthy teeth and gums Complete eye exam: High blood sugar levels can damage the eyes. This exam is done by an ophthalmologist or optometrist. It includes a dilated eye exam. The exam shows whether there's damage to the back of the eye (diabetic retinopathy). · How often: Once a year. If you don't have any signs of diabetic retinopathy, your doctor may recommend an exam every 2 years. · Goal: No damage to the back of the eye Thyroid-stimulating hormone (TSH) blood test: This test checks for thyroid disease. Too little thyroid hormone can cause some medicines (like insulin) to stay in the body longer. This can cause low blood sugar. You may be tested if you have high cholesterol or are a woman over 48years old. · How often: As part of your diabetes diagnosis, and as often as your doctor recommends after that · Goal: Normal level of TSH in the blood Follow-up care is a key part of your treatment and safety. Be sure to make and go to all appointments, and call your doctor if you are having problems. It's also a good idea to know your test results and keep a list of the medicines you take. Where can you learn more? Go to http://lisa-tabitha.info/. Enter [id number] in the search box to learn more about \"Learning About Tests When You Have Diabetes. \" Current as of: April 16, 2019 Content Version: 12.2 © 8263-7565 [employer]. Care instructions adapted under license by [employer] (which disclaims liability or warranty for this information). If you have questions about a medical condition or this instruction, always ask your healthcare professional. [address] any warranty or liability for your use of this information.

## 2020-01-31 NOTE — PROGRESS NOTES
Jewel Strickland presents today for   Chief Complaint   Patient presents with    Diabetes     follow up   [address]     done 12-18-19       Is someone accompanying this pt? no    Is the patient using any DME equipment during OV? no    Depression Screening:  3 most recent PHQ Screens 1/31/2020   Little interest or pleasure in doing things Several days   Feeling down, depressed, irritable, or hopeless Several days   Total Score PHQ 2 2       Learning Assessment:  Learning Assessment 1/31/2020   PRIMARY LEARNER Patient   HIGHEST LEVEL OF EDUCATION - PRIMARY LEARNER  SOME COLLEGE   BARRIERS PRIMARY LEARNER NONE   CO-LEARNER CAREGIVER No   PRIMARY LANGUAGE ENGLISH   LEARNER PREFERENCE PRIMARY DEMONSTRATION   ANSWERED BY patient   RELATIONSHIP SELF       Abuse Screening:  Abuse Screening Questionnaire 1/31/2020   Do you ever feel afraid of your partner? N   Are you in a relationship with someone who physically or mentally threatens you? N   Is it safe for you to go home? Y       Fall Risk  No flowsheet data found. Health Maintenance reviewed and discussed and ordered per Provider. Health Maintenance Due   Topic Date Due    LIPID PANEL Q1  12/14/2019   . Coordination of Care:  1. Have you been to the ER, urgent care clinic since your last visit? Hospitalized since your last visit? no    2. Have you seen or consulted any other health care providers outside of the [address] [place] [address] since your last visit? Include any pap smears or colon screening. Yes Podiatry for Left Foot Care.

## 2020-01-31 NOTE — PROGRESS NOTES
INTERNISTS OF [facility]:  1/31/2020, MRN: [id number]      Avinash Gonzalez is a 27 y.o. male and presents to clinic for Diabetes (follow up   ROOM  11) and Labs (done 12-18-19)    Subjective: The patient is a 76-year-old male with history of hypertension, type 2 diabetes, lumbar facet arthropathy, obesity, left transmetatarsal amputation secondary to a diabetic foot infection, and HLD. 1.  Type 2 Diabetes: His most recent labs show that his A1c is up to 12. 3. He has microalbuminuria. His creatinine is high normal.  I added glipizide and metformin. He is checking his blood glucose on average: bid. He brought in his log and states adamantly that they are truly his measurements. The highest BG was 160s and the lowest was 80s. +Left plantar ulcer, diagnosed since his last apt. He has to f/u with with a Wound Clinic next wk at [facility] next week. He states he has made significant changes to his diet. He has cut back significantly on carbs per his history. 1/16/20 Arterial PVL: Normal lower extremity arterial examination at rest.    1/15/20 Left Foot Xray:Plantar ulceration as described. No radiographic evidence of osteomyelitis. 2. HTN: BP is 145/85 and 132/82 today. He is taking Lasix and losartan. No adverse effects of taking these medicines. 3. HLD: He is taking Lipitor. He is overdue for a lipid panel. Weight is 298 pounds. 4. Anemia: His hemoglobin recently checked was in the 12 range. No bleeding.       Patient Active Problem List    Diagnosis Date Noted    Erectile dysfunction 01/05/2020    Snoring 01/05/2020    Anemia 01/05/2020    Hyperlipidemia LDL goal <70 12/14/2018    Cataract of both eyes 10/08/2018    Hypertensive retinopathy of both eyes 10/08/2018    Essential hypertension 09/06/2018    Controlled type 2 diabetes mellitus, without long-term current use of insulin ([address] 06/19/2018    Proliferative diabetic retinopathy of both eyes ([address] 06/19/2018    Obesity, morbid, BMI 40.0-49.9 ([facility] 05/20/2018    Lumbar facet arthropathy 05/20/2018       Current Outpatient Medications   Medication Sig Dispense Refill    Blood-Glucose Meter monitoring kit Use to check blood sugar 2 times per day. 1 Kit 0    glucose blood VI test strips (ASCENSIA AUTODISC VI, ONE TOUCH ULTRA TEST VI) strip Use to check blood sugars twice a day. 100 Strip 3    lancets misc Use to check blood sugar twice a day 100 Each 3    glipiZIDE SR (GLUCOTROL XL) 5 mg CR tablet Take 1 Tab by mouth daily. 30 Tab 1    furosemide (LASIX) 20 mg tablet Take 1 Tab by mouth daily. 30 Tab 2    losartan (COZAAR) 100 mg tablet Take 1 Tab by mouth daily. 90 Tab 1    metFORMIN (GLUCOPHAGE) 1,000 mg tablet Take 1 Tab by mouth two (2) times daily (with meals). Indications: type 2 diabetes mellitus 180 Tab 1    atorvastatin (LIPITOR) 40 mg tablet Take 1 Tab by mouth daily. 90 Tab 3    cyanocobalamin (VITAMIN B12) 500 mcg tablet Take 1 Tab by mouth daily. 90 Tab 3    folic acid 350 mcg tablet Take 1 Tab by mouth daily. 90 Tab 3       Allergies   Allergen Reactions    Amlodipine Swelling     Had swelling in his legs and feet - states was painful to walk       Past Medical History:   Diagnosis Date    Diabetes ([facility]     Hypertension        Past Surgical History:   Procedure Laterality Date    HX AMPUTATION TOE         No family history on file. Social History     Tobacco Use    Smoking status: Never Smoker    Smokeless tobacco: Never Used   Substance Use Topics    Alcohol use: No       ROS   Review of Systems   Constitutional: Negative for chills and fever. HENT: Negative for ear pain and sore throat. Eyes: Negative for blurred vision and pain. Respiratory: Negative for cough and shortness of breath. Cardiovascular: Negative for chest pain. Gastrointestinal: Negative for abdominal pain, blood in stool and melena. Genitourinary: Negative for dysuria and hematuria.    Musculoskeletal: Negative for joint pain and myalgias. Skin: Negative for rash. Neurological: Positive for tingling. Negative for focal weakness and headaches. Endo/Heme/Allergies: Does not bruise/bleed easily. Psychiatric/Behavioral: Negative for substance abuse. Objective     Vitals:    01/31/20 1055   BP: 145/85   Pulse: 94   Resp: 16   Temp: 98.8 °F (37.1 °C)   TempSrc: Oral   SpO2: 100%   Weight: 298 lb 12.8 oz (135.5 kg)   Height: 5' 8\" (1.727 m)   PainSc:   3   PainLoc: Foot       Physical Exam  Vitals signs and nursing note reviewed. Constitutional:       Appearance: Normal appearance. HENT:      Head: Normocephalic and atraumatic. Right Ear: External ear normal.      Left Ear: External ear normal.      Nose: Nose normal.      Mouth/Throat:      Mouth: Mucous membranes are moist.      Pharynx: Oropharynx is clear. No oropharyngeal exudate or posterior oropharyngeal erythema. Eyes:      Extraocular Movements: Extraocular movements intact. Conjunctiva/sclera: Conjunctivae normal.      Pupils: Pupils are equal, round, and reactive to light. Neck:      Musculoskeletal: Neck supple. No muscular tenderness. Cardiovascular:      Pulses: Normal pulses. Heart sounds: Normal heart sounds. Pulmonary:      Effort: Pulmonary effort is normal.      Breath sounds: Normal breath sounds. Abdominal:      General: Abdomen is flat. Bowel sounds are normal. There is no distension. Palpations: Abdomen is soft. Musculoskeletal:         General: No swelling (Bue) or tenderness (Bue). Lymphadenopathy:      Cervical: No cervical adenopathy. Skin:     General: Skin is warm and dry. Findings: No erythema. Neurological:      Mental Status: He is alert and oriented to person, place, and time.       Gait: Gait normal.   Psychiatric:         Mood and Affect: Mood normal.         LABS   Data Review:   Lab Results   Component Value Date/Time    WBC 9.6 12/18/2019 02:00 PM    HGB 12.1 (L) 12/18/2019 02:00 PM HCT 36.1 (L) 12/18/2019 02:00 PM    PLATELET 404 68/20/8365 02:00 PM    MCV 79 12/18/2019 02:00 PM       Lab Results   Component Value Date/Time    Sodium 137 12/18/2019 02:00 PM    Potassium 4.5 12/18/2019 02:00 PM    Chloride 99 12/18/2019 02:00 PM    CO2 24 12/18/2019 02:00 PM    Glucose 299 (H) 12/18/2019 02:00 PM    BUN 29 (H) 12/18/2019 02:00 PM    Creatinine 1.23 12/18/2019 02:00 PM    BUN/Creatinine ratio 24 (H) 12/18/2019 02:00 PM    GFR est AA 87 12/18/2019 02:00 PM    GFR est non-AA 76 12/18/2019 02:00 PM    Calcium 9.7 12/18/2019 02:00 PM       Lab Results   Component Value Date/Time    Cholesterol, total 155 12/14/2018 08:38 AM    HDL Cholesterol 32 (L) 12/14/2018 08:38 AM    LDL, calculated 92 12/14/2018 08:38 AM    VLDL, calculated 31 12/14/2018 08:38 AM    Triglyceride 154 (H) 12/14/2018 08:38 AM       Lab Results   Component Value Date/Time    Hemoglobin A1c 12.3 (H) 12/18/2019 02:00 PM       Assessment/Plan:   1. Essential hypertension: Stable. Continue with Rx as prescribed. We will check labs 1 week before his follow-up appoint. ORDERS:  - LIPID PANEL; Future  - HEMOGLOBIN A1C W/O EAG; Future    2. Anemia: Etiology is unknown.  Checking a FIT test to r/o GI bleed. ORDERS:  - OCCULT BLOOD IMMUNOASSAY,DIAGNOSTIC; Future    3. Controlled type 2 diabetes mellitus: +Left plantar toe ulcer. I encouraged him to follow-up with his wound care clinic and dietary change. Continue with Rx as prescribed but I am increasing his glipizide from 5 mg XL daily to 10 mg XL daily given his postprandial blood glucose today in our office of 200. I encouraged him to reduce his carb intake. I encouraged him to reduce his weight as well. I will recheck his weight at his follow-up appointment. I encouraged him to continue checking his blood glucoses.    I will have him follow-up with our clinical pharmacy team in March per patient request since he cannot take off any time during the month of February.  Checking labs 1 week before his follow-up appointment. ORDERS:  - LIPID PANEL; Future  - HEMOGLOBIN A1C W/O EAG; Future  - AMB POC GLUCOSE, QUANTITATIVE, BLOOD  - glipiZIDE SR (GLUCOTROL XL) 10 mg CR tablet; Take 1 Tab by mouth daily. Dispense: 30 Tab; Refill: 5    4. Hyperlipidemia LDL goal <70:   Continue with Rx as prescribed. We will check labs 1 week before his follow-up appointment. ORDERS:  - LIPID PANEL; Future  - HEMOGLOBIN A1C W/O EAG; Future        Health Maintenance Due   Topic Date Due    LIPID PANEL Q1  12/14/2019         Lab review: labs are reviewed in the EHR and ordered as mentioned above    I have discussed the diagnosis with the patient and the intended plan as seen in the above orders. The patient has received an after-visit summary and questions were answered concerning future plans. I have discussed medication side effects and warnings with the patient as well. I have reviewed the plan of care with the patient, accepted their input and they are in agreement with the treatment goals. All questions were answered. The patient understands the plan of care. Handouts provided today with above information. Pt instructed if symptoms worsen to call the office or report to the ED for continued care. Greater than 50% of the visit time was spent in counseling and/or coordination of care. Voice recognition was used to generate this report, which may have resulted in some phonetic based errors in grammar and contents. Even though attempts were made to correct all the mistakes, some may have been missed, and remained in the body of the document.           Cely Al MD

## 2020-02-10 ENCOUNTER — TELEPHONE (OUTPATIENT)
Dept: INTERNAL MEDICINE CLINIC | Age: 35
End: 2020-02-10

## 2020-02-10 NOTE — TELEPHONE ENCOUNTER
Patient reached and he states that he didn't know how to file for disability. Patient states that he is currently working. Patient advised to contact his employer about their policy/process.

## 2020-02-10 NOTE — TELEPHONE ENCOUNTER
lvm for patient to return our call. Patient advised to leave as much information in message as possible.

## 2020-02-10 NOTE — TELEPHONE ENCOUNTER
Pt calling asking to speak to Dr. Ольга Fregoso. Says he is applying for disability and he has some questions to ask her. Tried to get the information but he said he just needs to ask the doctor or her nurse.

## 2020-03-06 ENCOUNTER — TELEPHONE (OUTPATIENT)
Dept: INTERNAL MEDICINE CLINIC | Age: 35
End: 2020-03-06

## 2020-03-06 NOTE — TELEPHONE ENCOUNTER
Pt calling had to cancel appt today. Wants to speak with Christian Mccollum about when he can be seen.  Needs special time frames

## 2020-03-06 NOTE — TELEPHONE ENCOUNTER
Called patient to follow up to reschedule PharmD appointment. No answer, left message requesting patient return call to PharmD.     Leonardo Amaya, PharmD  Clinical Pharmacist Specialist
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
## 2020-03-16 ENCOUNTER — TELEPHONE (OUTPATIENT)
Dept: FAMILY MEDICINE CLINIC | Age: 35
End: 2020-03-16

## 2020-03-16 ENCOUNTER — OFFICE VISIT (OUTPATIENT)
Dept: FAMILY MEDICINE CLINIC | Age: 35
End: 2020-03-16

## 2020-03-16 VITALS — HEART RATE: 108 BPM | SYSTOLIC BLOOD PRESSURE: 137 MMHG | DIASTOLIC BLOOD PRESSURE: 81 MMHG

## 2020-03-16 DIAGNOSIS — E11.37X9 CONTROLLED TYPE 2 DIABETES MELLITUS WITH DIABETIC MACULAR EDEMA RESOLVED AFTER TREATMENT, WITHOUT LONG-TERM CURRENT USE OF INSULIN, UNSPECIFIED LATERALITY (HCC): Primary | ICD-10-CM

## 2020-03-16 DIAGNOSIS — I10 ESSENTIAL HYPERTENSION: Primary | ICD-10-CM

## 2020-03-16 RX ORDER — FUROSEMIDE 20 MG/1
20 TABLET ORAL DAILY
Qty: 30 TAB | Refills: 5 | Status: SHIPPED | OUTPATIENT
Start: 2020-03-16 | End: 2020-09-22

## 2020-03-16 NOTE — Clinical Note
Hi Dr. Em Wong,  Mr. Preston Yun BG have come down to 70s-180s on his current regimen of metformin and glipizide XL 10 mg daily. I continued his current regimen for now. I scheduled a follow up with him in 4 weeks on the same day as his next visit with you. Please let me know if you have any additional recommendations for him.   Rakesh Clemens, PharmD Clinical Pharmacist Specialist

## 2020-03-16 NOTE — TELEPHONE ENCOUNTER
Called patient to complete Covid-19 screening prior to scheduled appointment today. Patient denies any SOB/fevers/chills or any other symptoms and denies any recent sick exposure or travel. States he has cough from seasonal allergies, but denies any other symptoms.     Karyn Osei, PharmD  Clinical Pharmacist Specialist

## 2020-03-16 NOTE — PATIENT INSTRUCTIONS
Your Visit Summary:  
 
1.) Check your blood sugars at least once a day (fasting, bedtime) and whenever you are not feeling well. Bring your meter/log to all future visits. 2.) Continue your metformin 1,000 mg twice daily and glipizide XL 10 mg daily. 3.) Continue carb counting and using the plate method. Your blood sugar goals: 
- Fasting (first thing in the morning)  blood sugar: 80 - 130  
- 1 to 2 hours after a meal: less than 180 When you experience symptoms of low blood sugar (example: less than 70): - Confirm low reading by checking your blood sugar.  
- Then treat with 15 grams of carbohydrates (one-half cup of juice or regular soda, or 4-5 glucose tablets). - Wait 15 minutes to recheck blood sugar.  
- Then eat a protein containing meal/snack to prevent another low blood sugar episode. (example: peanut butter + crackers) Other recommendations: - Schedule an annual eye exam. 
- Check your feet daily for any signs of open wounds, cuts, or sores. - Given your risk factors, the following vaccines are recommended: annual flu shot, age-based pneumococcal vaccines (Pneumovax, Prevnar 13). In addition to taking your medications as directed, improving your blood sugar involves modifying your nutrition and maximizing the amount of physical activity. Nutrition: 
- When reviewing a nutrition label, focus on the serving size, total calories, fat (and type of fats), total carbohydrates, sugar (and amount of added sugar), amount of fiber (good for your digestive), and amount of protein. Refer to your nutrition label guide for more information. 
- For a meal : max 45 - 60 grams of carbohydrates - For a snack: max 15 grams of carbohydrates - Reduce amount of saturated and trans fat. Consider more unsaturated fat options as they are better for your heart health. - Have at least 1 serving of lean fat protein with each meal.   
- Increase fiber intake slowly to prevent constipation. - Substitute fruit juices for the whole fruit Physical Activity: - Aim for 30 minutes of consistent, moderately intensive, physical activity a day for 5 days or an average of 150 minutes per week. - Start slow, increase as tolerated. For example: Walk every day, working up to 30 minutes of brisk walking, 5 days a weekor split the 30 minutes into two 15-minute or three 10-minute walks. - If you sit for a long time, get up and move/stretch every 90 minutes. Please call [phone] for any medication or diabetes questions.  
 
Shane Copeland, Clinical Pharmacist

## 2020-04-06 ENCOUNTER — TELEPHONE (OUTPATIENT)
Dept: INTERNAL MEDICINE CLINIC | Age: 35
End: 2020-04-06

## 2020-04-06 NOTE — TELEPHONE ENCOUNTER
Called patient to discuss upcoming PharmD appointment on 4/14/2020. No answer, left voicemail requesting patient return call to PharmD. Will continue to attempt to reach patient.     Dajuan Montoya, PharmD  Clinical Pharmacist Specialist

## 2020-04-08 ENCOUNTER — TELEPHONE (OUTPATIENT)
Dept: INTERNAL MEDICINE CLINIC | Age: 35
End: 2020-04-08

## 2020-04-08 NOTE — TELEPHONE ENCOUNTER
Called patient to discuss upcoming PharmD appointment on 4/14/2020. No answer, left voicemail requesting patient return call to PharmD.     Lon Mcleod, PharmD  Clinical Pharmacist Specialist

## 2020-04-14 ENCOUNTER — VIRTUAL VISIT (OUTPATIENT)
Dept: INTERNAL MEDICINE CLINIC | Age: 35
End: 2020-04-14

## 2020-04-14 ENCOUNTER — TELEPHONE (OUTPATIENT)
Dept: INTERNAL MEDICINE CLINIC | Age: 35
End: 2020-04-14

## 2020-04-14 DIAGNOSIS — E11.9 TYPE 2 DIABETES MELLITUS WITHOUT COMPLICATION, WITHOUT LONG-TERM CURRENT USE OF INSULIN (HCC): Primary | ICD-10-CM

## 2020-04-14 DIAGNOSIS — E11.37X9 CONTROLLED TYPE 2 DIABETES MELLITUS WITH DIABETIC MACULAR EDEMA RESOLVED AFTER TREATMENT, WITHOUT LONG-TERM CURRENT USE OF INSULIN, UNSPECIFIED LATERALITY (HCC): Primary | ICD-10-CM

## 2020-04-14 RX ORDER — AMOXICILLIN AND CLAVULANATE POTASSIUM 875; 125 MG/1; MG/1
1 TABLET, FILM COATED ORAL EVERY 12 HOURS
COMMUNITY
Start: 2020-04-13 | End: 2020-04-28 | Stop reason: ALTCHOICE

## 2020-04-14 NOTE — TELEPHONE ENCOUNTER
Pharmacy Progress Note - Telephone Encounter    S/O: Mr. Trevor Matute 27 y.o. male contacted via an outbound telephone call to discuss PharmD office visit scheduled for 4/14 today. A/P:  - Patient agrees to change office visit to telephone appointment today at 2:30PM  - Patient endorses understanding to the provided information. All questions answered at this time.        Thank you,  Radha Toussaint, PharmD  Clinical Pharmacist Specialist

## 2020-04-14 NOTE — Clinical Note
Hi Dr. Edgardo Lock,  I spoke with Mr. Ethel Anand about his BG yesterday. His fasting BG have been 70s-90s and before meal BG have been 100s-120s. He did have 1 episode of hypoglycemia since last visit. I had him continue his current BG regimen and will have him check fasting and postprandial BG. I am calling him in 2 weeks to follow up. Please let me know if you have any additional recommendations for him.   James Clemens, PharmD Clinical Pharmacist Specialist

## 2020-04-14 NOTE — PROGRESS NOTES
Bhumika Clemens is a 27 y.o. male who was seen by synchronous (real-time) audio-video technology on 4/14/2020. Assessment & Plan:   Type 2 diabetes: +Diabetic left foot ulcer  I instructed him to check his blood sugar 2 hours after eating. Continue with Rx as prescribed. I will follow-up with him in 4 weeks to assess his home blood glucose readings. I stressed the importance of carb counting and lowering his carb intake.  I encouraged him to continue following up with his podiatrist.  Anne Rendon with amoxicillin course per Podiatry. Lab review: labs are reviewed in the EHR     I have discussed the diagnosis with the patient and the intended plan as seen in the above orders. I have discussed medication side effects and warnings with the patient as well. I have reviewed the plan of care with the patient, accepted their input and they are in agreement with the treatment goals. All questions were answered. The patient understands the plan of care. Pt instructed if symptoms worsen to call the office or report to the ED for continued care. Greater than 50% of the visit time was spent in counseling and/or coordination of care. Voice recognition was used to generate this report, which may have resulted in some phonetic based errors in grammar and contents. Even though attempts were made to correct all the mistakes, some may have been missed, and remained in the body of the document. Subjective:   Bhumika Clemens was seen for   Chief Complaint   Patient presents with    High Blood Sugar     The patient is a 76-year-old male with history of hypertension, type 2 diabetes, lumbar facet arthropathy, obesity, left transmetatarsal amputation secondary to a diabetic foot infection, and HLD. Type 2 DM and Left Foot Plantar Ulcer: His A1c was 12.3 in December. He is taking glipizide and metformin. No adverse side effects with taking these medicines. Home BG readings: 79-170s.  He checks his BG before meals. He checks his BG tid. Diet: He admits to eating a lot of snacks and oatmeal.     At his last apt, he had a left plantar ulcer. He was seen a physician at a wound care clinic at [facility].  Since his last appointment, he reports: the left plantar ulcer is still present. He saw his podiatrist. \"The left side of my foot is not healing well. \" He was put on abx. He was placed on amoxicillin. He has a f/u apt with his podiatrist in 2 wks. No fever/chills. Prior to Admission medications    Medication Sig Start Date End Date Taking? Authorizing Provider   furosemide (LASIX) 20 mg tablet Take 1 Tab by mouth daily. 3/16/20   Radha Burnham MD   glipiZIDE SR (GLUCOTROL XL) 10 mg CR tablet Take 1 Tab by mouth daily. 1/31/20   Radha Burnham MD   Blood-Glucose Meter monitoring kit Use to check blood sugar 2 times per day. 1/10/20   Radha Burnham MD   glucose blood VI test strips (ASCENSIA AUTODISC VI, ONE TOUCH ULTRA TEST VI) strip Use to check blood sugars twice a day. 1/10/20   Radha Burnham MD   lancets misc Use to check blood sugar twice a day 1/10/20   Radha Burnham MD   losartan (COZAAR) 100 mg tablet Take 1 Tab by mouth daily. 1/2/20   Radha Burnham MD   metFORMIN (GLUCOPHAGE) 1,000 mg tablet Take 1 Tab by mouth two (2) times daily (with meals). Indications: type 2 diabetes mellitus 1/1/20   Radha Burnham MD   atorvastatin (LIPITOR) 40 mg tablet Take 1 Tab by mouth daily. 1/1/20   Radha Burnham MD   cyanocobalamin (VITAMIN B12) 500 mcg tablet Take 1 Tab by mouth daily. 12/14/18   Martina Macias NP   folic acid 010 mcg tablet Take 1 Tab by mouth daily.  12/14/18   Martina Macias NP     Allergies   Allergen Reactions    Amlodipine Swelling     Had swelling in his legs and feet - states was painful to walk     Past Medical History:   Diagnosis Date    Diabetes ([address]     Hypertension      Past Surgical History:   Procedure Laterality Date    HX AMPUTATION TOE       No family history on file. Social History     Socioeconomic History    Marital status:      Spouse name: Not on file    Number of children: Not on file    Years of education: Not on file    Highest education level: Not on file   Tobacco Use    Smoking status: Never Smoker    Smokeless tobacco: Never Used   Substance and Sexual Activity    Alcohol use: No    Drug use: No    Sexual activity: Yes     Partners: Female       ROS:  Gen: No fever/chills  HEENT: No sore throat, eye pain, ear pain, or congestion. No HA  CV: No CP  Resp: No cough/SOB  GI: No abdominal pain  : No hematuria/dysuria  Derm: No rash  Neuro: No new paresthesias/weakness  Musc: No new myalgias/jt pain  Psych: No depression sx      Objective:     General: alert, cooperative, no distress   Mental  status: mental status: alert, oriented to person, place, and time, normal mood, behavior, speech, dress, motor activity, and thought processes   Resp: resp: normal effort and no respiratory distress   Neuro: neuro: no gross deficits   Skin: skin: no discoloration or lesions of concern on visible areas   His left foot plantar ulcer is not evaluated/made today as it was wrapped in a dressing, done by his podiatrist yesterday.     LABS:  Lab Results   Component Value Date/Time    Sodium 137 12/18/2019 02:00 PM    Potassium 4.5 12/18/2019 02:00 PM    Chloride 99 12/18/2019 02:00 PM    CO2 24 12/18/2019 02:00 PM    Glucose 299 (H) 12/18/2019 02:00 PM    BUN 29 (H) 12/18/2019 02:00 PM    Creatinine 1.23 12/18/2019 02:00 PM    BUN/Creatinine ratio 24 (H) 12/18/2019 02:00 PM    GFR est AA 87 12/18/2019 02:00 PM    GFR est non-AA 76 12/18/2019 02:00 PM    Calcium 9.7 12/18/2019 02:00 PM       Lab Results   Component Value Date/Time    Cholesterol, total 155 12/14/2018 08:38 AM    HDL Cholesterol 32 (L) 12/14/2018 08:38 AM    LDL, calculated 92 12/14/2018 08:38 AM    VLDL, calculated 31 12/14/2018 08:38 AM    Triglyceride 154 (H) 12/14/2018 08:38 AM       Lab Results   Component Value Date/Time    WBC 9.6 12/18/2019 02:00 PM    HGB 12.1 (L) 12/18/2019 02:00 PM    HCT 36.1 (L) 12/18/2019 02:00 PM    PLATELET 790 48/33/2026 02:00 PM    MCV 79 12/18/2019 02:00 PM       Lab Results   Component Value Date/Time    Hemoglobin A1c 12.3 (H) 12/18/2019 02:00 PM       Lab Results   Component Value Date/Time    TSH 1.990 12/18/2019 02:00 PM           Due to this being a TeleHealth  evaluation, many elements of the physical examination are unable to be assessed. The pt was seen by synchronous (real-time) audio-video technology, and/or her healthcare decision maker, is aware that this patient-initiated, Telehealth encounter is a billable service, with coverage as determined by her insurance carrier. She is aware that she may receive a bill and has provided verbal consent to proceed: Yes. The pt is being evaluated by a video visit encounter for concerns as above. A caregiver was present when appropriate. Due to this being a TeleHealth encounter (During [place] public health emergency), evaluation of the following organ systems was limited: Vitals/Constitutional/EENT/Resp/CV/GI//MS/Neuro/Skin/Heme-Lymph-Imm. Pursuant to the emergency declaration under the [address] [place] [address], [facility] waiver authority and the [employer] and [employer] Act, this Virtual  Visit was conducted, with patient's (and/or legal guardian's) consent, to reduce the patient's risk of exposure to COVID-19 and provide necessary medical care. Services were provided through a video synchronous discussion virtually to substitute for in-person clinic visit. Patient and provider were located at their individual homes. We discussed the expected course, resolution and complications of the diagnosis(es) in detail. Medication risks, benefits, costs, interactions, and alternatives were discussed as indicated.   I advised him to contact the office if his condition worsens, changes or fails to improve as anticipated. He expressed understanding with the diagnosis(es) and plan.      Suhail Rodriguez MD

## 2020-04-14 NOTE — PROGRESS NOTES
Pharmacy Note:  Diabetes Follow-up    S/O: Placed an outgoing call to patient to follow-up on SMBG readings and diabetes. Patient referred by Myra Maynard MD for diabetes management.    -States podiatrist put him on Augmentin 10 day course for his foot    Patient is currently taking the following for diabetes: glipizide XL 10 mg daily, metformin 1,000 mg daily. He reports signs/sx of hypoglycemia. States one day a few weeks ago he woke up and felt dizzy. States he drank half cup of orange juice then felt better. Last A1C was 12.3 on 12/18/19. Patient is checking His blood sugar 3 times daily and reports the following values:  Date Fasting Before Lunch Before Dinner   4/14 82     4/13 90     4/12 79 110 103   4/11 93 117 (didn't have lunch)    4/10 97  120, 115   4/9 82  103   4/8 94  110   4/7 90 100 106   4/6      -States he usually checks BG before meals; states BG usually around 100 before dinner  -States highest BG since last PharmD visit was 180 before dinner    Diet/Exercise:  -States he eats turkey burgers or beef burgers wrapped in lettuce    Medication Adherence:  -Patient denies adherence with medications  -Patient denies adverse effects from medications    Screenings:  Diabetic Foot and Eye Exam HM Status   Topic Date Due    Diabetic Foot Care  01/05/2021    Eye Exam  12/17/2021       Current Outpatient Medications   Medication Sig    furosemide (LASIX) 20 mg tablet Take 1 Tab by mouth daily.  glipiZIDE SR (GLUCOTROL XL) 10 mg CR tablet Take 1 Tab by mouth daily.  Blood-Glucose Meter monitoring kit Use to check blood sugar 2 times per day.  glucose blood VI test strips (ASCENSIA AUTODISC VI, ONE TOUCH ULTRA TEST VI) strip Use to check blood sugars twice a day.  lancets misc Use to check blood sugar twice a day    losartan (COZAAR) 100 mg tablet Take 1 Tab by mouth daily.  metFORMIN (GLUCOPHAGE) 1,000 mg tablet Take 1 Tab by mouth two (2) times daily (with meals). Indications: type 2 diabetes mellitus    atorvastatin (LIPITOR) 40 mg tablet Take 1 Tab by mouth daily.  cyanocobalamin (VITAMIN B12) 500 mcg tablet Take 1 Tab by mouth daily.  folic acid 898 mcg tablet Take 1 Tab by mouth daily. No current facility-administered medications for this visit. Lab Results   Component Value Date/Time    Hemoglobin A1c 12.3 (H) 12/18/2019 02:00 PM       Lab Results   Component Value Date/Time    Sodium 137 12/18/2019 02:00 PM    Potassium 4.5 12/18/2019 02:00 PM    Chloride 99 12/18/2019 02:00 PM    CO2 24 12/18/2019 02:00 PM    Glucose 299 (H) 12/18/2019 02:00 PM    BUN 29 (H) 12/18/2019 02:00 PM    Creatinine 1.23 12/18/2019 02:00 PM    BUN/Creatinine ratio 24 (H) 12/18/2019 02:00 PM    GFR est AA 87 12/18/2019 02:00 PM    GFR est non-AA 76 12/18/2019 02:00 PM    Calcium 9.7 12/18/2019 02:00 PM       Lab Results   Component Value Date/Time    Cholesterol, total 155 12/14/2018 08:38 AM    HDL Cholesterol 32 (L) 12/14/2018 08:38 AM    LDL, calculated 92 12/14/2018 08:38 AM    Triglyceride 154 (H) 12/14/2018 08:38 AM       A/P:    Diabetes:  -Instructed patient to continue glipizide XL and metformin  -Reiterated importance of checking BG 2 times daily (fasting, 2 hours postprandial)    Reviewed with patient signs/sx/treatment of hypoglycemia. Patient verbalized understanding. Will follow up with patient on 4/28 at 2:00 PM for telephone visit. Will route note to PCP.     Rian Garcia, PharmD  Clinical Pharmacist Specialist    CLINICAL PHARMACY CONSULT: MED RECONCILIATION/REVIEW ADDENDUM    For Pharmacy Admin Tracking Only    PHSO: PHSO Patient?: No  Total # of Interventions Recommended: Count: 1 - diabetes education  Total Interventions Accepted: 1  Time Spent (min): 2623 [facility], PharmD

## 2020-04-28 ENCOUNTER — TELEPHONE (OUTPATIENT)
Dept: INTERNAL MEDICINE CLINIC | Age: 35
End: 2020-04-28

## 2020-04-28 ENCOUNTER — VIRTUAL VISIT (OUTPATIENT)
Dept: INTERNAL MEDICINE CLINIC | Age: 35
End: 2020-04-28

## 2020-04-28 DIAGNOSIS — E11.9 TYPE 2 DIABETES MELLITUS WITHOUT COMPLICATION, WITHOUT LONG-TERM CURRENT USE OF INSULIN (HCC): Primary | ICD-10-CM

## 2020-04-28 NOTE — PROGRESS NOTES
Pharmacy Note:  Diabetes Follow-up    S/O: Placed an outgoing call to patient to follow-up on SMBG readings and diabetes. Patient referred by Jerry Alvarenga MD for diabetes management. States he might be having surgery on left foot tomorrow at [address] [place]. States he got MRI last week, and he reports he was told by podiatrist that his foot might be infected. States he was told this was scheduled for either tomorrow or later this week. States he completed course of Augmentin around 4 days ago. Patient is currently taking the following for diabetes: glipizide XL 10 mg daily, metformin 1,000 mg daily. He denies any signs/sx of hypoglycemia. Denies any signs of hyperglycemia. Last A1C was 12.3 on 12/18/19. Patient is checking His blood sugar 1-2 times daily and reports the following values:  -States his BG have been 149 (after breakfast), 140 (after dinner yesterday), 130 (after breakfast), 150 (after dinner), 120 (afte breakfast), 156 (after dinner)  -States highest BG since last visit was 159, lowest was 110    Diet/Exercise:  -States he has been eating less carbs for breakfast - avoiding oatmeal and having more eggs  -States otherwise diet is similar  -States he eats some sugar free jello, trail mix, and peaches for snacks  -States he mainly eats chicken and fish    Medication Adherence:  -Patient reports adherence with medications  -Patient denies adverse effects from medications    Screenings:  Diabetic Foot and Eye Exam HM Status   Topic Date Due    Diabetic Foot Care  01/05/2021    Eye Exam  12/17/2021       Current Outpatient Medications   Medication Sig    amoxicillin-clavulanate (AUGMENTIN) 875-125 mg per tablet Take 1 Tab by mouth every twelve (12) hours.  furosemide (LASIX) 20 mg tablet Take 1 Tab by mouth daily.  glipiZIDE SR (GLUCOTROL XL) 10 mg CR tablet Take 1 Tab by mouth daily.  Blood-Glucose Meter monitoring kit Use to check blood sugar 2 times per day.     glucose blood VI test strips (ASCENSIA AUTODISC VI, ONE TOUCH ULTRA TEST VI) strip Use to check blood sugars twice a day.  lancets misc Use to check blood sugar twice a day    losartan (COZAAR) 100 mg tablet Take 1 Tab by mouth daily.  metFORMIN (GLUCOPHAGE) 1,000 mg tablet Take 1 Tab by mouth two (2) times daily (with meals). Indications: type 2 diabetes mellitus    atorvastatin (LIPITOR) 40 mg tablet Take 1 Tab by mouth daily.  cyanocobalamin (VITAMIN B12) 500 mcg tablet Take 1 Tab by mouth daily.  folic acid 868 mcg tablet Take 1 Tab by mouth daily. No current facility-administered medications for this visit. Lab Results   Component Value Date/Time    Hemoglobin A1c 12.3 (H) 12/18/2019 02:00 PM       Lab Results   Component Value Date/Time    Sodium 137 12/18/2019 02:00 PM    Potassium 4.5 12/18/2019 02:00 PM    Chloride 99 12/18/2019 02:00 PM    CO2 24 12/18/2019 02:00 PM    Glucose 299 (H) 12/18/2019 02:00 PM    BUN 29 (H) 12/18/2019 02:00 PM    Creatinine 1.23 12/18/2019 02:00 PM    BUN/Creatinine ratio 24 (H) 12/18/2019 02:00 PM    GFR est AA 87 12/18/2019 02:00 PM    GFR est non-AA 76 12/18/2019 02:00 PM    Calcium 9.7 12/18/2019 02:00 PM       Lab Results   Component Value Date/Time    Cholesterol, total 155 12/14/2018 08:38 AM    HDL Cholesterol 32 (L) 12/14/2018 08:38 AM    LDL, calculated 92 12/14/2018 08:38 AM    Triglyceride 154 (H) 12/14/2018 08:38 AM       A/P:    Diabetes:  -Instructed patient to continue glipizide and metformin  -Instructed him to ask his podiatrist about instructions for medications for surgery tomorrow  -Reiterated importance of continuing to check BG 1-2 times daily (fasting and post-prandial)    Reviewed with patient signs/sx/treatment of hypoglycemia. Patient verbalized understanding. Will follow up with patient on 5/5 for telephone visit. Will route note to PCP.     Lon Mcleod, PharmD  Clinical Pharmacist Specialist    CLINICAL PHARMACY CONSULT: MED RECONCILIATION/REVIEW ADDENDUM    For Pharmacy Admin Tracking Only    PHSO: PHSO Patient?: No  Total # of Interventions Recommended: Count: 0  Total Interventions Accepted: 0  Time Spent (min): Joel 967, 1634 [facility], PharmD

## 2020-04-28 NOTE — TELEPHONE ENCOUNTER
Received call from patient stating that his foot surgery at [facility] was moved to this Friday morning on 5/1. States he was told he will get call tomorrow regarding instructions for surgery. Will make PCP aware.     Schuyler Aguilar, PharmD  Clinical Pharmacist Specialist

## 2020-04-28 NOTE — Clinical Note
Audie Krause,  I spoke with Mr. Codi oMrrison today, he states he may be getting outpatient foot surgery tomorrow at [facility]. He states he got an MRI of his foot last week and his podiatrist told him his left foot may be infected. He had just finished a course of Augmentin. His BG have been running 110s-150s postprandial.  I told him to follow up with his podiatrist about medication instructions for surgery since I was not sure the exact details of the procedure. I told him they would likely have him hold his glipizide and metformin the morning of surgery. I told him to continue metformin and glipizide once cleared after his surgery and continue to check BG 2 times daily (fasting and postprandial). I am calling him in 1 week to follow up. Please let me know if you have any additional recommendations for him.   Kandi Clemens, PharmD Clinical Pharmacist Specialist

## 2020-05-12 ENCOUNTER — VIRTUAL VISIT (OUTPATIENT)
Dept: INTERNAL MEDICINE CLINIC | Age: 35
End: 2020-05-12

## 2020-05-12 DIAGNOSIS — E11.621 DIABETIC ULCER OF LEFT HEEL ASSOCIATED WITH TYPE 2 DIABETES MELLITUS, UNSPECIFIED ULCER STAGE (HCC): ICD-10-CM

## 2020-05-12 DIAGNOSIS — L97.429 DIABETIC ULCER OF LEFT HEEL ASSOCIATED WITH TYPE 2 DIABETES MELLITUS, UNSPECIFIED ULCER STAGE (HCC): ICD-10-CM

## 2020-05-12 DIAGNOSIS — E11.37X9 CONTROLLED TYPE 2 DIABETES MELLITUS WITH DIABETIC MACULAR EDEMA RESOLVED AFTER TREATMENT, WITHOUT LONG-TERM CURRENT USE OF INSULIN, UNSPECIFIED LATERALITY (HCC): Primary | ICD-10-CM

## 2020-05-12 NOTE — PROGRESS NOTES
Robby Wilkins is a 27 y.o. male who was seen by synchronous (real-time) audio-video technology on 5/12/2020. Assessment & Plan:   Controlled type 2 diabetes mellitus: +Diabetic Left heel ulcer/infection s/p abx and debridement 5/1/20  - Continue to follow up with Podiatry for rx of the diabetic heel ulcer and infection.  - C/w rx as prescribed. Adding Jardiance. We discussed the side effect profile of this rx. All questions were answered. - We discussed how I believe he needs a shot of long acting insulin daily for better DM control. He seemed hesitant about this. We will max out on po rx and have him c/w BG checks at home. I will f/u with him in 6 weeks to assess his response to this new rx. - Low carb diet stressed once more. ORDERS:  - empagliflozin (Jardiance) 25 mg tablet; Take 1 Tab by mouth daily. Dispense: 30 Tab; Refill: 5      Lab review: labs are reviewed in the EHR     I have discussed the diagnosis with the patient and the intended plan as seen in the above orders. I have discussed medication side effects and warnings with the patient as well. I have reviewed the plan of care with the patient, accepted their input and they are in agreement with the treatment goals. All questions were answered. The patient understands the plan of care. Pt instructed if symptoms worsen to call the office or report to the ED for continued care. Greater than 50% of the visit time was spent in counseling and/or coordination of care. Voice recognition was used to generate this report, which may have resulted in some phonetic based errors in grammar and contents. Even though attempts were made to correct all the mistakes, some may have been missed, and remained in the body of the document.       Subjective:   Robby Wilkins was seen for   Chief Complaint   Patient presents with    Follow-up     The patient is a 39-year-old male with history of hypertension, type 2 diabetes, lumbar facet arthropathy, obesity, left transmetatarsal amputation secondary to a diabetic foot infection, and HLD. Type 2 DM and Left Foot Plantar Ulcer: At his last apt, he reported having a left plantar ulcer, which was being treated by Podiatry. Since then, he reports: no pain along his left heel. He is scheduled to meet with Podiatry next wk. He was told to keep the dressing on his heel and to offset pressure from his heel as much as possible. BG  Checks: bid. Taking: glipizide and metformin. Diet: he is still consuming high amts of carb but his wife has forced him to cut back on his portions. Fever/chills: none. S/p augmentin course. Pain along the left plantar ulcer: none. Paresthesias in BLE: none. He admits that BGs at home have been 120-200s. Prior to Admission medications    Medication Sig Start Date End Date Taking? Authorizing Provider   traMADoL (ULTRAM) 50 mg tablet Take 50 mg by mouth every six (6) hours as needed for Pain. Provider, Historical   amoxicillin-clavulanate (Augmentin) 875-125 mg per tablet Take 1 Tab by mouth every twelve (12) hours. Provider, Historical   furosemide (LASIX) 20 mg tablet Take 1 Tab by mouth daily. 3/16/20   Helga Delong MD   glipiZIDE SR (GLUCOTROL XL) 10 mg CR tablet Take 1 Tab by mouth daily. 1/31/20   Helga Delong MD   Blood-Glucose Meter monitoring kit Use to check blood sugar 2 times per day. 1/10/20   Helga Delong MD   glucose blood VI test strips (ASCENSIA AUTODISC VI, ONE TOUCH ULTRA TEST VI) strip Use to check blood sugars twice a day. 1/10/20   Helga Delong MD   lancets misc Use to check blood sugar twice a day 1/10/20   Helga Delong MD   losartan (COZAAR) 100 mg tablet Take 1 Tab by mouth daily. 1/2/20   Helga Delong MD   metFORMIN (GLUCOPHAGE) 1,000 mg tablet Take 1 Tab by mouth two (2) times daily (with meals).  Indications: type 2 diabetes mellitus 1/1/20   Hegla Delong MD   atorvastatin (LIPITOR) 40 mg tablet Take 1 Tab by mouth daily. 1/1/20   Jerry Alvarenga MD   cyanocobalamin (VITAMIN B12) 500 mcg tablet Take 1 Tab by mouth daily. 12/14/18   Elliot Macias NP   folic acid 665 mcg tablet Take 1 Tab by mouth daily. 12/14/18   Elliot Macias NP     Allergies   Allergen Reactions    Amlodipine Swelling     Had swelling in his legs and feet - states was painful to walk     Past Medical History:   Diagnosis Date    Diabetes ([facility] [address]     Hypertension      Past Surgical History:   Procedure Laterality Date    HX AMPUTATION TOE       No family history on file. Social History     Socioeconomic History    Marital status:      Spouse name: Not on file    Number of children: Not on file    Years of education: Not on file    Highest education level: Not on file   Tobacco Use    Smoking status: Never Smoker    Smokeless tobacco: Never Used   Substance and Sexual Activity    Alcohol use: No    Drug use: No    Sexual activity: Yes     Partners: Female       ROS:  Gen: No fever/chills  HEENT: No sore throat, eye pain, ear pain, or congestion.  No HA  CV: No CP  Resp: No cough/SOB  GI: No abdominal pain  : No hematuria/dysuria  Derm: No rash  Neuro: No new paresthesias/weakness  Musc: No new myalgias/jt pain  Psych: No depression sx      Objective:     General: alert, cooperative, no distress   Mental  status: mental status: alert, oriented to person, place, and time, normal mood, behavior, speech, dress, motor activity, and thought processes   Resp: resp: normal effort and no respiratory distress   Neuro: neuro: no gross deficits   Skin: skin: no discoloration or lesions of concern on visible areas; his left foot was not examined - as it had a dressing on it     LABS:  Lab Results   Component Value Date/Time    Sodium 137 12/18/2019 02:00 PM    Potassium 4.5 12/18/2019 02:00 PM    Chloride 99 12/18/2019 02:00 PM    CO2 24 12/18/2019 02:00 PM    Glucose 299 (H) 12/18/2019 02:00 PM    BUN 29 (H) 12/18/2019 02:00 PM Creatinine 1.23 12/18/2019 02:00 PM    BUN/Creatinine ratio 24 (H) 12/18/2019 02:00 PM    GFR est AA 87 12/18/2019 02:00 PM    GFR est non-AA 76 12/18/2019 02:00 PM    Calcium 9.7 12/18/2019 02:00 PM       Lab Results   Component Value Date/Time    Cholesterol, total 155 12/14/2018 08:38 AM    HDL Cholesterol 32 (L) 12/14/2018 08:38 AM    LDL, calculated 92 12/14/2018 08:38 AM    VLDL, calculated 31 12/14/2018 08:38 AM    Triglyceride 154 (H) 12/14/2018 08:38 AM       Lab Results   Component Value Date/Time    WBC 9.6 12/18/2019 02:00 PM    HGB 12.1 (L) 12/18/2019 02:00 PM    HCT 36.1 (L) 12/18/2019 02:00 PM    PLATELET 447 82/11/3625 02:00 PM    MCV 79 12/18/2019 02:00 PM       Lab Results   Component Value Date/Time    Hemoglobin A1c 12.3 (H) 12/18/2019 02:00 PM       Lab Results   Component Value Date/Time    TSH 1.990 12/18/2019 02:00 PM           Due to this being a TeleHealth  evaluation, many elements of the physical examination are unable to be assessed. The pt was seen by synchronous (real-time) audio-video technology, and/or her healthcare decision maker, is aware that this patient-initiated, Telehealth encounter is a billable service, with coverage as determined by her insurance carrier. She is aware that she may receive a bill and has provided verbal consent to proceed: Yes. The pt is being evaluated by a video visit encounter for concerns as above. A caregiver was present when appropriate. Due to this being a TeleHealth encounter ([facility]), evaluation of the following organ systems was limited: Vitals/Constitutional/EENT/Resp/CV/GI//MS/Neuro/Skin/Heme-Lymph-Imm.    Pursuant to the emergency declaration under the [facility] waiver authority and the [employer] Act, this Virtual  Visit was conducted, with patient's (and/or legal guardian's) consent, to reduce the patient's risk of exposure to COVID-19 and provide necessary medical care. Services were provided through a video synchronous discussion virtually to substitute for in-person clinic visit. Patient and provider were located at their individual homes. We discussed the expected course, resolution and complications of the diagnosis(es) in detail. Medication risks, benefits, costs, interactions, and alternatives were discussed as indicated. I advised him to contact the office if his condition worsens, changes or fails to improve as anticipated. He expressed understanding with the diagnosis(es) and plan.      Shayan Valentino MD

## 2020-05-13 ENCOUNTER — TELEPHONE (OUTPATIENT)
Dept: INTERNAL MEDICINE CLINIC | Age: 35
End: 2020-05-13

## 2020-05-13 NOTE — TELEPHONE ENCOUNTER
Pt calling to let Charbel Valles know he hasn't been able to  his medication from yesterday as of yet because he is waiting for his new insurance card to come in the mail. Says it is over 500.00 so he does have to wait for insurance.

## 2020-05-14 DIAGNOSIS — E11.37X9 CONTROLLED TYPE 2 DIABETES MELLITUS WITH DIABETIC MACULAR EDEMA RESOLVED AFTER TREATMENT, WITHOUT LONG-TERM CURRENT USE OF INSULIN, UNSPECIFIED LATERALITY (HCC): Primary | ICD-10-CM

## 2020-05-18 NOTE — TELEPHONE ENCOUNTER
Patient had regular medicaid.  ID# [id number]  Medicaid # [id number]  Until May 31, 2020 and then he will [facility] on 6-1-20

## 2020-05-18 NOTE — TELEPHONE ENCOUNTER
Phone number provided was incorrect, contacted patients pharmacy, they gave me number they have on file [phone].

## 2020-05-18 NOTE — TELEPHONE ENCOUNTER
Card info is required, like bin number and group numbers, phone number to insurance that is usually on the back of the card, as well as the name on the top of the card which is not in the system. Please get this info from the patient.

## 2020-05-22 NOTE — PROGRESS NOTES
Pharmacy Note:  Diabetes Follow-up    S/O: Placed an outgoing call to patient to follow-up on SMBG readings and diabetes. Patient referred by Johnny Myers MD for diabetes management. States he completed full course of Augmentin and has podiatrist appointment on June 1st.    Patient is currently taking the following for diabetes: glipizide XL 10 mg daily, metformin 1,000 mg daily, Jardiance 25 mg daily.  -States he started Jardiance less than 1 week ago    He denies any signs/sx of hypoglycemia. Denies any BG <70. Last A1C was 12.3 on 12/18/19. States he has been urinating a lot from adjust. States he has been trying to drink a lot of water. Denies any symptoms of UTI/yeast infections or any dizziness. Denies any tingling in fingers/toes or blurry vision    Patient is checking His blood sugar 1-2 times daily and reports the following values:  -States he doesn't have meter with him since he isn't home currently  -States yesterday he thinks BG was around 120s after breakfast  -States fasting BG were usually around 80s-100s  -States after dinner, he thinks BG were around 130s-140s    Diet/Exercise:  -States he usually has 2-3 meals per day  -Breakfast - Small bowl of grits or scrambled eggs  -Lunch - Varies; sometimes 1 slice of wheat bread with turkey, sometimes turkey burger with no bread  -Dinner - Sometimes salad with some steak in it and light vinaigrette  -Snacks - Trail mix    Medication Adherence:  -Patient reports adherence with medications  -Patient denies adverse effects from medications    Screenings:  Diabetic Foot and Eye Exam HM Status   Topic Date Due    Diabetic Foot Care  01/05/2021    Eye Exam  12/17/2021       Current Outpatient Medications   Medication Sig    empagliflozin (Jardiance) 25 mg tablet Take 1 Tab by mouth daily.  furosemide (LASIX) 20 mg tablet Take 1 Tab by mouth daily.  glipiZIDE SR (GLUCOTROL XL) 10 mg CR tablet Take 1 Tab by mouth daily.     Blood-Glucose Meter monitoring kit Use to check blood sugar 2 times per day.  glucose blood VI test strips (ASCENSIA AUTODISC VI, ONE TOUCH ULTRA TEST VI) strip Use to check blood sugars twice a day.  lancets misc Use to check blood sugar twice a day    losartan (COZAAR) 100 mg tablet Take 1 Tab by mouth daily.  metFORMIN (GLUCOPHAGE) 1,000 mg tablet Take 1 Tab by mouth two (2) times daily (with meals). Indications: type 2 diabetes mellitus    atorvastatin (LIPITOR) 40 mg tablet Take 1 Tab by mouth daily.  traMADoL (ULTRAM) 50 mg tablet Take 50 mg by mouth every six (6) hours as needed for Pain.  cyanocobalamin (VITAMIN B12) 500 mcg tablet Take 1 Tab by mouth daily.  folic acid 399 mcg tablet Take 1 Tab by mouth daily. No current facility-administered medications for this visit.         Lab Results   Component Value Date/Time    Hemoglobin A1c 12.3 (H) 12/18/2019 02:00 PM       Lab Results   Component Value Date/Time    Sodium 137 12/18/2019 02:00 PM    Potassium 4.5 12/18/2019 02:00 PM    Chloride 99 12/18/2019 02:00 PM    CO2 24 12/18/2019 02:00 PM    Glucose 299 (H) 12/18/2019 02:00 PM    BUN 29 (H) 12/18/2019 02:00 PM    Creatinine 1.23 12/18/2019 02:00 PM    BUN/Creatinine ratio 24 (H) 12/18/2019 02:00 PM    GFR est AA 87 12/18/2019 02:00 PM    GFR est non-AA 76 12/18/2019 02:00 PM    Calcium 9.7 12/18/2019 02:00 PM       Lab Results   Component Value Date/Time    Cholesterol, total 155 12/14/2018 08:38 AM    HDL Cholesterol 32 (L) 12/14/2018 08:38 AM    LDL, calculated 92 12/14/2018 08:38 AM    Triglyceride 154 (H) 12/14/2018 08:38 AM       A/P:    Diabetes:  -Instructed patient to continue current regimen of glipizide XL 10 mg daily, metformin 1,000 mg daily, Jardiance 25 mg daily  -Reiterated importance of checking BG 1-2 times daily (fasting, 2 hours after dinner) and having BG monitor at all PharmD and PCP appointments  -Instructed patient to call PharmD or PCP if he feels dizzy or has symptoms of UTI/yeast infection; instructed him to get home upper arm BG automatic cuff if possible  -Discussed that patient should avoid trail mix due to raisins/chocolate in it having high sugar; discussed other low carb snack alternatives    Reviewed with patient signs/sx/treatment of hypoglycemia. Patient verbalized understanding. Will follow up with patient in 2 weeks for telephone visit. Will route note to PCP.     Nolia Buerger, PharmD  Clinical Pharmacist Specialist    CLINICAL PHARMACY CONSULT: MED RECONCILIATION/REVIEW ADDENDUM    For Pharmacy Admin Tracking Only    PHSO: PHSO Patient?: No  Total # of Interventions Recommended: Count: 0  Total Interventions Accepted: 0  Time Spent (min): Joel 112, [facility], PharmD

## 2020-05-26 ENCOUNTER — VIRTUAL VISIT (OUTPATIENT)
Dept: INTERNAL MEDICINE CLINIC | Age: 35
End: 2020-05-26

## 2020-05-26 DIAGNOSIS — E11.9 TYPE 2 DIABETES MELLITUS WITHOUT COMPLICATION, WITHOUT LONG-TERM CURRENT USE OF INSULIN (HCC): Primary | ICD-10-CM

## 2020-05-26 NOTE — Clinical Note
Hi Dr. Pepe Hallman,  I spoke with Mr. Roge Garcias this morning. He started jardiance less than 1 week ago and has been drinking plenty of water. He did not have BG monitor with him but reports BG 80s-140s. I reiterated importance of him checking BG 1-2 times daily and having BG monitor ready for all PharmD and PCP appointments. I had him continue current BG regimen and advised him to monitor for signs of UTI/yeast infection and dizzienss. I advised him to get home BP cuff if possible. I will call him in 2 weeks to follow up on BG. Of note, he completed full course of Augmentin and has podiatry appt on June 1. I am a little concerned about possible increased risk of lower limb amputation with SGLT-2 therapy. He should continue following closely with podiatry and we should continue to weigh risk of this adverse effect.   Kaley Clemens, PharmD Clinical Pharmacist Specialist

## 2020-06-09 ENCOUNTER — VIRTUAL VISIT (OUTPATIENT)
Dept: INTERNAL MEDICINE CLINIC | Age: 35
End: 2020-06-09

## 2020-06-09 DIAGNOSIS — E11.9 TYPE 2 DIABETES MELLITUS WITHOUT COMPLICATION, WITHOUT LONG-TERM CURRENT USE OF INSULIN (HCC): Primary | ICD-10-CM

## 2020-06-09 RX ORDER — LANOLIN ALCOHOL/MO/W.PET/CERES
1000 CREAM (GRAM) TOPICAL DAILY
COMMUNITY
End: 2021-03-31

## 2020-06-09 NOTE — PROGRESS NOTES
Pharmacy Note:  Diabetes Follow-up    S/O: Placed an outgoing call to patient to follow-up on SMBG readings and diabetes. Patient referred by Tiffani Bradley MD for diabetes management. States he has upcoming follow up with podiatrist on Monday. States having a little foot discomfort. Patient is currently taking the following for diabetes: glipizide XL 10 mg daily, metformin 1,000 mg daily, Jardiance 25 mg daily. He denies any signs/sx of hypoglycemia. Denies any BG <70. Last A1C was 12.3 on 12/18/19. Reports polyuria due to Jardiance.  Also reports blurry vision, and states he went to eye doctor yesterday.  -States eye doctor \"stuck needle in both of my eyes to decrease the swelling\"  -States he has follow up with eye doctor in 6 weeks    Patient is checking His blood sugar 1-2 times daily and reports the following values:  Date Fasting After Breakfast Bedtime   6/9  125    6/8  136 119   6/7 96  132   6/6 100     6/4 102  140   6/3 122  136   6/2  132    6/1   145   -States highest BG since he started Aline Hoffman was 145, lowest was 96    Diet/Exercise:  -States he has been drinking plenty of water  -Breakfast - 2 pancakes and sugar free syrup; normally some scrambled eggs with turkey meat; rarely has grits and boiled eggs; sometimes sandwich with whole wheat bread  -Lunch - Sometimes doesn't eat lunch since he doesn't feel hungry; sometimes beef burger with no bread  -Dinner - Sometimes turkey meatloaf or food from [employer] (super greens - lots of vegetables with grilled chicken)    Medication Adherence:  -Patient reports adherence with medications  -Patient denies adverse effects from medications  -Denies any itching/burning with urination or any dizziness    Screenings:  Diabetic Foot and Eye Exam HM Status   Topic Date Due    Diabetic Foot Care  01/05/2021    Eye Exam  12/17/2021       Current Outpatient Medications   Medication Sig    cyanocobalamin (Vitamin B-12) 1,000 mcg tablet Take 1,000 mcg by mouth daily.  empagliflozin (Jardiance) 25 mg tablet Take 1 Tab by mouth daily.  traMADoL (ULTRAM) 50 mg tablet Take 50 mg by mouth every six (6) hours as needed for Pain.  furosemide (LASIX) 20 mg tablet Take 1 Tab by mouth daily.  glipiZIDE SR (GLUCOTROL XL) 10 mg CR tablet Take 1 Tab by mouth daily.  Blood-Glucose Meter monitoring kit Use to check blood sugar 2 times per day.  glucose blood VI test strips (ASCENSIA AUTODISC VI, ONE TOUCH ULTRA TEST VI) strip Use to check blood sugars twice a day.  lancets misc Use to check blood sugar twice a day    losartan (COZAAR) 100 mg tablet Take 1 Tab by mouth daily.  metFORMIN (GLUCOPHAGE) 1,000 mg tablet Take 1 Tab by mouth two (2) times daily (with meals). Indications: type 2 diabetes mellitus    atorvastatin (LIPITOR) 40 mg tablet Take 1 Tab by mouth daily.  folic acid 939 mcg tablet Take 1 Tab by mouth daily. No current facility-administered medications for this visit.         Lab Results   Component Value Date/Time    Hemoglobin A1c 12.3 (H) 12/18/2019 02:00 PM       Lab Results   Component Value Date/Time    Sodium 137 12/18/2019 02:00 PM    Potassium 4.5 12/18/2019 02:00 PM    Chloride 99 12/18/2019 02:00 PM    CO2 24 12/18/2019 02:00 PM    Glucose 299 (H) 12/18/2019 02:00 PM    BUN 29 (H) 12/18/2019 02:00 PM    Creatinine 1.23 12/18/2019 02:00 PM    BUN/Creatinine ratio 24 (H) 12/18/2019 02:00 PM    GFR est AA 87 12/18/2019 02:00 PM    GFR est non-AA 76 12/18/2019 02:00 PM    Calcium 9.7 12/18/2019 02:00 PM       Lab Results   Component Value Date/Time    Cholesterol, total 155 12/14/2018 08:38 AM    HDL Cholesterol 32 (L) 12/14/2018 08:38 AM    LDL, calculated 92 12/14/2018 08:38 AM    Triglyceride 154 (H) 12/14/2018 08:38 AM       A/P:    Diabetes:  -Instructed patient to continue glipizide XL 10 mg daily, metformin 1,000 mg daily, Jardiance 25 mg daily  -Reiterated importance of continuing checking BG 1-2 times daily  -Instructed him to contact PCP if he has signs of UTI/yeast infection or any dizziness  -Reminded him of importance of continuing to watch his carb intake  -Reminded him of importance of following closely with his podiatrist if he has any issues with his foot; discussed that Juliet Miner has small risk of worsening foot wound    Reviewed with patient signs/sx/treatment of hypoglycemia. Patient verbalized understanding. Will follow up with patient in 4 weeks for telephone visit. If BG continue to be at goal, will sign off on patient at follow up. Will route note to PCP.     Shayna NelsonD  Clinical Pharmacist Specialist    CLINICAL PHARMACY CONSULT: MED RECONCILIATION/REVIEW ADDENDUM    For Pharmacy Admin Tracking Only    PHSO: PHSO Patient?: No  Total # of Interventions Recommended: Count: 0  Total Interventions Accepted: 0  Time Spent (min): 138 Joanie Garcia, [id number] [facility], PharmD

## 2020-07-07 ENCOUNTER — VIRTUAL VISIT (OUTPATIENT)
Dept: INTERNAL MEDICINE CLINIC | Age: 35
End: 2020-07-07

## 2020-07-07 DIAGNOSIS — I10 ESSENTIAL HYPERTENSION: ICD-10-CM

## 2020-07-07 DIAGNOSIS — E11.8 TYPE 2 DIABETES MELLITUS WITH COMPLICATION, WITHOUT LONG-TERM CURRENT USE OF INSULIN (HCC): ICD-10-CM

## 2020-07-07 RX ORDER — LOSARTAN POTASSIUM 100 MG/1
100 TABLET ORAL DAILY
Qty: 90 TAB | Refills: 1 | Status: SHIPPED | OUTPATIENT
Start: 2020-07-07 | End: 2021-01-04

## 2020-07-07 RX ORDER — METFORMIN HYDROCHLORIDE 1000 MG/1
1000 TABLET ORAL 2 TIMES DAILY WITH MEALS
Qty: 180 TAB | Refills: 1 | Status: SHIPPED | OUTPATIENT
Start: 2020-07-07 | End: 2021-01-04

## 2020-07-07 NOTE — PROGRESS NOTES
Pharmacy Note:  Diabetes Follow-up    S/O: Placed an outgoing call to patient to follow-up on SMBG readings and diabetes. Patient referred by Jackie Jane MD for diabetes management. Discussed with patient the Pharmacist Collaborative Practice Agreement. Patient provided verbal and/or electronic (ex. mychart) consent to participate in the collaborative practice agreement between the pharmacist and referred patient. This is in lieu of paper consent due to COVID-19 precautions and the use of remote/virtual visits. States he has small ulcer on left foot. States he is going to wound clinic every 2 weeks. States he applies MediHoney to it and changes it every other day. Patient is currently taking the following for diabetes: glipizide XL 10 mg daily, metformin 1,000 mg daily, Jardiance 25 mg daily.  -States he needs refills for metformin and losartan    He denies any signs/sx of hypoglycemia. Denies any BG <70. Last A1C was 12.3 on 12/18/19. Denies any signs of hyperglycemia except blurry vision, which is improving. States he is seeing eye doctor later this month.     Patient is checking His blood sugar 1-2 times daily and reports the following values:  Date Fasting After Breakfast Before Lunch Before Dinner   7/7 125      7/6 101  140    7/5  100  120   7/4 98   145   7/3 113  127    7/2  130  127   7/1 98        Diet/Exercise:  -Breakfast - Eggs and turkey sausage  -Lunch - Sometimes not that hungry; sometimes has burger with lettuce and tomato (no bread)  -Dinner - [place] chicken breast or grilled chicken salad with string beans or vegetables  -Snacks - Skinny popcorn    Medication Adherence:  -Patient reports adherence with medications  -Patient denies adverse effects from medications    Screenings:  Diabetic Foot and Eye Exam HM Status   Topic Date Due    Diabetic Foot Care  01/05/2021    Eye Exam  12/17/2021       Current Outpatient Medications   Medication Sig    cyanocobalamin (Vitamin B-12) 1,000 mcg tablet Take 1,000 mcg by mouth daily.  empagliflozin (Jardiance) 25 mg tablet Take 1 Tab by mouth daily.  traMADoL (ULTRAM) 50 mg tablet Take 50 mg by mouth every six (6) hours as needed for Pain.  furosemide (LASIX) 20 mg tablet Take 1 Tab by mouth daily.  glipiZIDE SR (GLUCOTROL XL) 10 mg CR tablet Take 1 Tab by mouth daily.  Blood-Glucose Meter monitoring kit Use to check blood sugar 2 times per day.  glucose blood VI test strips (ASCENSIA AUTODISC VI, ONE TOUCH ULTRA TEST VI) strip Use to check blood sugars twice a day.  lancets misc Use to check blood sugar twice a day    losartan (COZAAR) 100 mg tablet Take 1 Tab by mouth daily.  metFORMIN (GLUCOPHAGE) 1,000 mg tablet Take 1 Tab by mouth two (2) times daily (with meals). Indications: type 2 diabetes mellitus    atorvastatin (LIPITOR) 40 mg tablet Take 1 Tab by mouth daily.  folic acid 815 mcg tablet Take 1 Tab by mouth daily. No current facility-administered medications for this visit.         Lab Results   Component Value Date/Time    Hemoglobin A1c 12.3 (H) 12/18/2019 02:00 PM       Lab Results   Component Value Date/Time    Sodium 137 12/18/2019 02:00 PM    Potassium 4.5 12/18/2019 02:00 PM    Chloride 99 12/18/2019 02:00 PM    CO2 24 12/18/2019 02:00 PM    Glucose 299 (H) 12/18/2019 02:00 PM    BUN 29 (H) 12/18/2019 02:00 PM    Creatinine 1.23 12/18/2019 02:00 PM    BUN/Creatinine ratio 24 (H) 12/18/2019 02:00 PM    GFR est AA 87 12/18/2019 02:00 PM    GFR est non-AA 76 12/18/2019 02:00 PM    Calcium 9.7 12/18/2019 02:00 PM       Lab Results   Component Value Date/Time    Cholesterol, total 155 12/14/2018 08:38 AM    HDL Cholesterol 32 (L) 12/14/2018 08:38 AM    LDL, calculated 92 12/14/2018 08:38 AM    Triglyceride 154 (H) 12/14/2018 08:38 AM       A/P:    Diabetes:  -Instructed patient to continue current BG regimen of glipizide XL, metformin and Jardiance   -Patient has upcoming labwork on 7/24, will assess A1C   -Consider switching glipizide Xl to Trulicity at future visit to help with weight loss  -Reiterated importance of checking BG 1-2 times dialy  -Reiterated importance of drinking plenty of water and monitoring for UTI/yeast infections and dizziness and letting PCP know if these occur  -Discussed potential increased risk of lower limb amputation on Jardiance - reiterated importance of patient monitoring for worsening of foot ulcer and calling PCP if this occurs  -Sent refill for metformin Rx to his pharmacy per his request; routed Rx request for losartan to PCP    Reviewed with patient signs/sx/treatment of hypoglycemia. Patient verbalized understanding. Will follow up with patient in 3 weeks for telephone visit. Will route note to PCP.     Brent Galindo, PharmD  Clinical Pharmacist Specialist    CLINICAL PHARMACY CONSULT: MED RECONCILIATION/REVIEW ADDENDUM    For Pharmacy Admin Tracking Only    PHSO: PHSO Patient?: No  Total # of Interventions Recommended: Count: 0  Total Interventions Accepted: 0  Time Spent (min): 138 Joanie Garcia, [address] [facility], PharmD

## 2020-07-22 DIAGNOSIS — E11.37X9 CONTROLLED TYPE 2 DIABETES MELLITUS WITH DIABETIC MACULAR EDEMA RESOLVED AFTER TREATMENT, WITHOUT LONG-TERM CURRENT USE OF INSULIN, UNSPECIFIED LATERALITY (HCC): ICD-10-CM

## 2020-07-23 RX ORDER — GLIPIZIDE 10 MG/1
TABLET, FILM COATED, EXTENDED RELEASE ORAL
Qty: 30 TAB | Refills: 0 | Status: SHIPPED | OUTPATIENT
Start: 2020-07-23 | End: 2020-07-28 | Stop reason: DRUGHIGH

## 2020-07-24 ENCOUNTER — HOSPITAL ENCOUNTER (OUTPATIENT)
Dept: LAB | Age: 35
Discharge: HOME | End: 2020-07-24

## 2020-07-24 ENCOUNTER — APPOINTMENT (OUTPATIENT)
Dept: INTERNAL MEDICINE CLINIC | Age: 35
End: 2020-07-24

## 2020-07-24 LAB — XX-LABCORP SPECIMEN COL,LCBCF: NORMAL

## 2020-07-24 PROCEDURE — 99001 SPECIMEN HANDLING PT-LAB: CPT

## 2020-07-25 LAB
CHOLEST SERPL-MCNC: 259 MG/DL (ref 100–199)
HBA1C MFR BLD: 6 % (ref 4.8–5.6)
HDLC SERPL-MCNC: 34 MG/DL
INTERPRETATION, 910389: NORMAL
LDLC SERPL CALC-MCNC: 156 MG/DL (ref 0–99)
TRIGL SERPL-MCNC: 347 MG/DL (ref 0–149)
VLDLC SERPL CALC-MCNC: 69 MG/DL (ref 5–40)

## 2020-07-27 NOTE — PROGRESS NOTES
I will discuss his results with him at his upcoming appointment. His total cholesterol is up to 259 from 1.5 a year ago. His triglycerides are 347, up from 154 a year ago. His HDL is 34. His LDL is up to 156 from 92 a year ago. His A1c is down from 12.3 seven months ago to 6.     Dr. Keshawn Kaufman  [facility], [address] [place] [address], [address] [place] [address] Str.  Phone: [phone]  Fax: [phone]

## 2020-07-28 ENCOUNTER — VIRTUAL VISIT (OUTPATIENT)
Dept: INTERNAL MEDICINE CLINIC | Age: 35
End: 2020-07-28

## 2020-07-28 DIAGNOSIS — E11.8 TYPE 2 DIABETES MELLITUS WITH COMPLICATION, WITHOUT LONG-TERM CURRENT USE OF INSULIN (HCC): Primary | ICD-10-CM

## 2020-07-28 RX ORDER — GLIPIZIDE 5 MG/1
5 TABLET, FILM COATED, EXTENDED RELEASE ORAL DAILY
Qty: 30 TAB | Refills: 1 | Status: SHIPPED | OUTPATIENT
Start: 2020-07-28 | End: 2020-08-17 | Stop reason: ALTCHOICE

## 2020-07-28 RX ORDER — DULAGLUTIDE 0.75 MG/.5ML
0.75 INJECTION, SOLUTION SUBCUTANEOUS
Qty: 4 PEN | Refills: 1 | Status: SHIPPED | OUTPATIENT
Start: 2020-07-28 | End: 2020-08-31 | Stop reason: SDUPTHER

## 2020-07-28 NOTE — PROGRESS NOTES
Pharmacy Note:  Diabetes Follow-up    S/O: Placed an outgoing call to patient to follow-up on SMBG readings and diabetes. Patient referred by Lane Mccormick MD for diabetes management. Discussed with patient the Pharmacist Collaborative Practice Agreement. Patient provided verbal and/or electronic (ex. mychart) consent to participate in the collaborative practice agreement between the pharmacist and referred patient. This is in lieu of paper consent due to COVID-19 precautions and the use of remote/virtual visits. Patient is currently taking the following for diabetes: glipizide XL 10 mg daily, metformin 1,000 mg BID, Jardiance 25 mg daily. He denies any signs/sx of hypoglycemia. Denies any BG <70. Last A1C was 6.0 on 7/24/2020.     Denies any signs of hyperglycemia except for some blurry vision.  -States he is following with eye doctor - states next appointment is September    Patient is checking His blood sugar 1-2 times daily and reports the following values:  Date Fasting After Lunch Bedtime   7/28 90     7/27 103 108    7/26 94     7/25 101  104   7/24 106     7/23 97     7/22 91     7/21 90     7/20 111       Diet/Exercise:  -Breakfast - 2 egg white patties with spinach and turkey sausage  -Lunch - Sometimes skips when he isn't hungry; progresso soup  -Dinner - Rotisserie or baked chicken; rarely has steak; for sides has mainly vegetables (mixed veggies, string beans, peas, asparagus)  -Snacks - Sometimes has watermelon or grapes, skinny popcorn  -States he has been drinking Zevia drinks - has zero sugar and no artificial sweeteners; states he rarely has a sweet tea    Medication Adherence:  -Patient reports adherence with medications  -Patient denies adverse effects from medications  -Denies any itching/burning with urination, dizziness    Screenings:  Diabetic Foot and Eye Exam HM Status   Topic Date Due    Diabetic Foot Care  01/05/2021    Eye Exam  06/08/2022     Current Outpatient Medications   Medication Sig    glipiZIDE SR (GLUCOTROL XL) 10 mg CR tablet Take 1 tablet by mouth once daily    metFORMIN (GLUCOPHAGE) 1,000 mg tablet Take 1 Tab by mouth two (2) times daily (with meals). Indications: type 2 diabetes mellitus    losartan (COZAAR) 100 mg tablet Take 1 Tab by mouth daily.  cyanocobalamin (Vitamin B-12) 1,000 mcg tablet Take 1,000 mcg by mouth daily.  empagliflozin (Jardiance) 25 mg tablet Take 1 Tab by mouth daily.  traMADoL (ULTRAM) 50 mg tablet Take 50 mg by mouth every six (6) hours as needed for Pain.  furosemide (LASIX) 20 mg tablet Take 1 Tab by mouth daily.  Blood-Glucose Meter monitoring kit Use to check blood sugar 2 times per day.  glucose blood VI test strips (ASCENSIA AUTODISC VI, ONE TOUCH ULTRA TEST VI) strip Use to check blood sugars twice a day.  lancets misc Use to check blood sugar twice a day    atorvastatin (LIPITOR) 40 mg tablet Take 1 Tab by mouth daily.  folic acid 212 mcg tablet Take 1 Tab by mouth daily. No current facility-administered medications for this visit.         Lab Results   Component Value Date/Time    Hemoglobin A1c 6.0 (H) 07/24/2020 09:46 AM       Lab Results   Component Value Date/Time    Sodium 137 12/18/2019 02:00 PM    Potassium 4.5 12/18/2019 02:00 PM    Chloride 99 12/18/2019 02:00 PM    CO2 24 12/18/2019 02:00 PM    Glucose 299 (H) 12/18/2019 02:00 PM    BUN 29 (H) 12/18/2019 02:00 PM    Creatinine 1.23 12/18/2019 02:00 PM    BUN/Creatinine ratio 24 (H) 12/18/2019 02:00 PM    GFR est AA 87 12/18/2019 02:00 PM    GFR est non-AA 76 12/18/2019 02:00 PM    Calcium 9.7 12/18/2019 02:00 PM       Lab Results   Component Value Date/Time    Cholesterol, total 259 (H) 07/24/2020 09:46 AM    HDL Cholesterol 34 (L) 07/24/2020 09:46 AM    LDL, calculated 156 (H) 07/24/2020 09:46 AM    Triglyceride 347 (H) 07/24/2020 09:46 AM     A/P:    Diabetes:  -Instructed patient to start Trulicity 5.35 mg once weekly and decrease glipizide XL to 5 mg daily  -Changing glipizide XL to Trulicity to help decrease weight gain with glipizide  -Sent Rx for Trulicity and glipizide XL 5 mg tablet to his pharmacy  -Reviewed proper administration and storage of trulicity  -Instructed him to continue metformin and Jardiance  -Discussed that A1C is now at goal at 6.0 on 7/24/2020  -Reiterated importance of continuing to check BG 1-2 times daily    Patient verbalized understanding. Will follow up with patient in 2 weeks for telephone visit. Will route note to PCP.     Reji Graves, ShaynaD  Clinical Pharmacist Specialist    CLINICAL PHARMACY CONSULT: MED RECONCILIATION/REVIEW ADDENDUM    For Pharmacy Admin Tracking Only    PHSO: PHSO Patient?: No  Total # of Interventions Recommended: Count: 2  - Decreased Dose #: 1  - New Order #: 1 New Medication Order Reason(s): Needs Additional Medication Therapy  Total Interventions Accepted: 3  Time Spent (min): 26[address] [facility], PharmD

## 2020-07-28 NOTE — Clinical Note
Hi Dr. Rivas Reason,    I spoke with Mr. Kenny Cain yesterday. I saw his A1C is at goal at 6.0. However, he had some weight gain from his glipizide. I am trying to transition his glipizide XL to Trulicity to help with weight gain but maintain BG control. I started Trulicity 2.19 mg once weekly and cut glipizide XL down to 5 mg daily. If he tolerates Trulicity, I will work with him to uptitrate this and get him off glipizide. I am following up with him in 2 weeks.    Sudheer Clemens, PharmD  Clinical Pharmacist Specialist

## 2020-07-29 DIAGNOSIS — E11.37X9 CONTROLLED TYPE 2 DIABETES MELLITUS WITH DIABETIC MACULAR EDEMA RESOLVED AFTER TREATMENT, WITHOUT LONG-TERM CURRENT USE OF INSULIN, UNSPECIFIED LATERALITY (HCC): ICD-10-CM

## 2020-07-29 RX ORDER — GLIPIZIDE 10 MG/1
TABLET, FILM COATED, EXTENDED RELEASE ORAL
Qty: 30 TAB | Refills: 0 | OUTPATIENT
Start: 2020-07-29

## 2020-07-30 ENCOUNTER — DOCUMENTATION ONLY (OUTPATIENT)
Dept: INTERNAL MEDICINE CLINIC | Age: 35
End: 2020-07-30

## 2020-07-30 NOTE — PROGRESS NOTES
Your request has been approved for Trulicity 4.05/3.1  PA Case: [id number], Status: Approved, Coverage Starts on: 7/30/2020 12:00:00 AM, Coverage Ends on: 7/30/2021 faxed to pharmacy.

## 2020-07-31 ENCOUNTER — TELEPHONE (OUTPATIENT)
Dept: INTERNAL MEDICINE CLINIC | Age: 35
End: 2020-07-31

## 2020-07-31 ENCOUNTER — VIRTUAL VISIT (OUTPATIENT)
Dept: INTERNAL MEDICINE CLINIC | Age: 35
End: 2020-07-31

## 2020-07-31 DIAGNOSIS — J30.9 ALLERGIC RHINITIS, UNSPECIFIED SEASONALITY, UNSPECIFIED TRIGGER: ICD-10-CM

## 2020-07-31 DIAGNOSIS — E11.37X9 CONTROLLED TYPE 2 DIABETES MELLITUS WITH DIABETIC MACULAR EDEMA RESOLVED AFTER TREATMENT, WITHOUT LONG-TERM CURRENT USE OF INSULIN, UNSPECIFIED LATERALITY (HCC): Primary | ICD-10-CM

## 2020-07-31 DIAGNOSIS — L97.429 DIABETIC ULCER OF LEFT HEEL ASSOCIATED WITH DIABETES MELLITUS DUE TO UNDERLYING CONDITION, UNSPECIFIED ULCER STAGE (HCC): ICD-10-CM

## 2020-07-31 DIAGNOSIS — E08.621 DIABETIC ULCER OF LEFT HEEL ASSOCIATED WITH DIABETES MELLITUS DUE TO UNDERLYING CONDITION, UNSPECIFIED ULCER STAGE (HCC): ICD-10-CM

## 2020-07-31 RX ORDER — FLUTICASONE PROPIONATE 50 MCG
2 SPRAY, SUSPENSION (ML) NASAL DAILY
Qty: 1 BOTTLE | Refills: 11 | Status: SHIPPED | OUTPATIENT
Start: 2020-07-31 | End: 2021-08-28

## 2020-07-31 NOTE — TELEPHONE ENCOUNTER
----- Message from Connie Way MD sent at 7/31/2020 10:48 AM EDT -----  Regarding: F/u apts needed  Please schedule for labs anytime after October 26. Please have him scheduled to follow up with me for a 30 in virtual apt after his lab apt, the first wk of November.     Thanks,  Dr. Daisha Luna  [facility], [address] [place] [address], [address] [place] [address] Str.  Phone: [phone]  Fax: [phone]

## 2020-07-31 NOTE — PROGRESS NOTES
Ulysses Dockery is a 27 y.o. male who was seen by synchronous (real-time) audio-video technology on 7/31/2020. Assessment & Plan:   1. Type 2 DM and Left Heel Ulcer: His ulcer is healing well per his hx. DM is well controlled. - Rechecking his A1C in 3 months. - C/w a low carb diet  - We discussed the plan to transition him to Trulicity in place of glipizide. We will do this gradually. I encouraged him to  the Trulicity (which was just made available by the pharmacy yesterday per pt hx) so that he can start it. If he tolerates this rx well, we can increase the dose and stop his glipizide altogether  - F/u with our clinical pharmacy team for continued support/assistance with DM management. - F/u with Podiatry/Wound Care team for continued rx of left heel ulcer. 2. Allergic rhinitis:  - Ok to use benadryl prn but ordering flonase (which is also OTC) for better efficacy and less side effects. Lab review: labs are reviewed in the EHR and ordered as mentioned above     I have discussed the diagnosis with the patient and the intended plan as seen in the above orders. I have discussed medication side effects and warnings with the patient as well. I have reviewed the plan of care with the patient, accepted their input and they are in agreement with the treatment goals. All questions were answered. The patient understands the plan of care. Pt instructed if symptoms worsen to call the office or report to the ED for continued care. Greater than 50% of the visit time was spent in counseling and/or coordination of care. Voice recognition was used to generate this report, which may have resulted in some phonetic based errors in grammar and contents. Even though attempts were made to correct all the mistakes, some may have been missed, and remained in the body of the document.       Subjective:   Ulysses Dockery was seen for   Chief Complaint   Patient presents with    Diabetes     The patient is a 77-year-old male with history of hypertension, type 2 diabetes, lumbar facet arthropathy, obesity, left transmetatarsal amputation secondary to a diabetic foot infection, and HLD.     1. Type 2 DM and Left Foot Plantar Ulcer: Earlier this year, he developed a diabetic left heel ulcer, complicated by infection. S/p abx and debridement in May. Since his last apt, he reports: He is scheduled with a Wound clinic on Monday. No foul odor or drainage. He is applying dressings per the Wound Care clinic instructions. He has some pain off/on along the left heel area, especially with walking. No fever. His A1C was checked earlier this summer and measured 6. He is taking Jardiance and metformin. Glipizide is being transitioned off as he transitions to Trulicity to help promote weight loss. He plans to  his Trulicity today to begin rx. He is strictly adhering to a low carb diet and has cut out bread from his diet! 2. Allergic Rhinitis: He has had some congestion, worse at night, relieved with benadryl. Benadryl makes him drowsy though. No fever, SOB, or sore throat. Prior to Admission medications    Medication Sig Start Date End Date Taking? Authorizing Provider   dulaglutide (Trulicity) 4.91 ZR/9.0 mL sub-q pen 0.5 mL by SubCUTAneous route every seven (7) days. 7/28/20   Shona Bernstein MD   glipiZIDE SR (GLUCOTROL XL) 5 mg CR tablet Take 1 Tab by mouth daily. 7/28/20   Shona Bernstein MD   metFORMIN (GLUCOPHAGE) 1,000 mg tablet Take 1 Tab by mouth two (2) times daily (with meals). Indications: type 2 diabetes mellitus 7/7/20   Shona Bernstein MD   losartan (COZAAR) 100 mg tablet Take 1 Tab by mouth daily. 7/7/20   Shona Bernstein MD   cyanocobalamin (Vitamin B-12) 1,000 mcg tablet Take 1,000 mcg by mouth daily. Provider, Historical   empagliflozin (Jardiance) 25 mg tablet Take 1 Tab by mouth daily.  5/12/20   Shona Bernstein MD   traMADoL Suzzane [place]) 50 mg tablet Take 50 mg by mouth every six (6) hours as needed for Pain. Provider, Historical   furosemide (LASIX) 20 mg tablet Take 1 Tab by mouth daily. 3/16/20   Thalia Chapa MD   Blood-Glucose Meter monitoring kit Use to check blood sugar 2 times per day. 1/10/20   Thalia Chapa MD   glucose blood VI test strips (ASCENSIA AUTODISC VI, ONE TOUCH ULTRA TEST VI) strip Use to check blood sugars twice a day. 1/10/20   Thalia Chapa MD   lancets misc Use to check blood sugar twice a day 1/10/20   Thalia Chapa MD   atorvastatin (LIPITOR) 40 mg tablet Take 1 Tab by mouth daily. 1/1/20   Thalia Chapa MD   folic acid 712 mcg tablet Take 1 Tab by mouth daily. 12/14/18   Jadiel Macias, NP     Allergies   Allergen Reactions    Amlodipine Swelling     Had swelling in his legs and feet - states was painful to walk     Past Medical History:   Diagnosis Date    Diabetes ([facility] [address]     Hypertension      Past Surgical History:   Procedure Laterality Date    HX AMPUTATION TOE       No family history on file.   Social History     Socioeconomic History    Marital status:      Spouse name: Not on file    Number of children: Not on file    Years of education: Not on file    Highest education level: Not on file   Tobacco Use    Smoking status: Never Smoker    Smokeless tobacco: Never Used   Substance and Sexual Activity    Alcohol use: No    Drug use: No    Sexual activity: Yes     Partners: Female       ROS:  Gen: No fever/chills  HEENT: No sore throat, eye pain, ear pain, or HA. +Congestion  CV: No CP  Resp: No cough/SOB  GI: No abdominal pain  : No hematuria/dysuria  Derm: No rash  Neuro: No new paresthesias/weakness  Musc: See HPI  Psych: No depression sx      Objective:     General: alert, cooperative, no distress   Mental  status: mental status: alert, oriented to person, place, and time, normal mood, behavior, speech, dress, motor activity, and thought processes   Resp: resp: normal effort and no respiratory distress Neuro: neuro: no gross deficits   Skin: skin: no discoloration or lesions of concern on visible areas     LABS:  Lab Results   Component Value Date/Time    Sodium 137 12/18/2019 02:00 PM    Potassium 4.5 12/18/2019 02:00 PM    Chloride 99 12/18/2019 02:00 PM    CO2 24 12/18/2019 02:00 PM    Glucose 299 (H) 12/18/2019 02:00 PM    BUN 29 (H) 12/18/2019 02:00 PM    Creatinine 1.23 12/18/2019 02:00 PM    BUN/Creatinine ratio 24 (H) 12/18/2019 02:00 PM    GFR est AA 87 12/18/2019 02:00 PM    GFR est non-AA 76 12/18/2019 02:00 PM    Calcium 9.7 12/18/2019 02:00 PM       Lab Results   Component Value Date/Time    Cholesterol, total 259 (H) 07/24/2020 09:46 AM    HDL Cholesterol 34 (L) 07/24/2020 09:46 AM    LDL, calculated 156 (H) 07/24/2020 09:46 AM    VLDL, calculated 69 (H) 07/24/2020 09:46 AM    Triglyceride 347 (H) 07/24/2020 09:46 AM       Lab Results   Component Value Date/Time    WBC 9.6 12/18/2019 02:00 PM    HGB 12.1 (L) 12/18/2019 02:00 PM    HCT 36.1 (L) 12/18/2019 02:00 PM    PLATELET 841 52/44/2673 02:00 PM    MCV 79 12/18/2019 02:00 PM       Lab Results   Component Value Date/Time    Hemoglobin A1c 6.0 (H) 07/24/2020 09:46 AM       Lab Results   Component Value Date/Time    TSH 1.990 12/18/2019 02:00 PM           Due to this being a TeleHealth  evaluation, many elements of the physical examination are unable to be assessed. The pt was seen by synchronous (real-time) audio-video technology, and/or her healthcare decision maker, is aware that this patient-initiated, Telehealth encounter is a billable service, with coverage as determined by her insurance carrier. She is aware that she may receive a bill and has provided verbal consent to proceed: Yes. The pt is being evaluated by a video visit encounter for concerns as above. A caregiver was present when appropriate.  Due to this being a TeleHealth encounter (During [facility] public health emergency), evaluation of the following organ systems was limited: Vitals/Constitutional/EENT/Resp/CV/GI//MS/Neuro/Skin/Heme-Lymph-Imm. Pursuant to the emergency declaration under the [facility] [place] [address] waiver authority and the [employer] Act, this Virtual  Visit was conducted, with patient's (and/or legal guardian's) consent, to reduce the patient's risk of exposure to COVID-19 and provide necessary medical care. Services were provided through a video synchronous discussion virtually to substitute for in-person clinic visit. Patient and provider were located at their individual homes. We discussed the expected course, resolution and complications of the diagnosis(es) in detail. Medication risks, benefits, costs, interactions, and alternatives were discussed as indicated. I advised him to contact the office if his condition worsens, changes or fails to improve as anticipated. He expressed understanding with the diagnosis(es) and plan.      Connie Way MD

## 2020-08-03 ENCOUNTER — TELEPHONE (OUTPATIENT)
Dept: INTERNAL MEDICINE CLINIC | Age: 35
End: 2020-08-03

## 2020-08-03 NOTE — TELEPHONE ENCOUNTER
Please have him get this letter from his podiatrist who is treating him for a left heel ulcer.     Dr. Mcneal Inch  [facility], [address] [place] [address], [address] [place] [address] Str.  Phone: [phone]  Fax: [phone]

## 2020-08-03 NOTE — TELEPHONE ENCOUNTER
Pt asking for a letter giving permission for him to drive because of his left foot.     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

## 2020-08-11 ENCOUNTER — VIRTUAL VISIT (OUTPATIENT)
Dept: INTERNAL MEDICINE CLINIC | Age: 35
End: 2020-08-11

## 2020-08-11 ENCOUNTER — TELEPHONE (OUTPATIENT)
Dept: INTERNAL MEDICINE CLINIC | Age: 35
End: 2020-08-11

## 2020-08-11 DIAGNOSIS — E11.37X9 CONTROLLED TYPE 2 DIABETES MELLITUS WITH DIABETIC MACULAR EDEMA RESOLVED AFTER TREATMENT, WITHOUT LONG-TERM CURRENT USE OF INSULIN, UNSPECIFIED LATERALITY (HCC): Primary | ICD-10-CM

## 2020-08-11 NOTE — PROGRESS NOTES
Pharmacy Note:  Diabetes Follow-up    S/O: Placed an outgoing call to patient to follow-up on SMBG readings and diabetes. Patient referred by Chloe Walton MD for diabetes management. Discussed with patient the Pharmacist Collaborative Practice Agreement. Patient provided verbal and/or electronic (ex. mychart) consent to participate in the collaborative practice agreement between the pharmacist and referred patient. This is in lieu of paper consent due to COVID-19 precautions and the use of remote/virtual visits. Patient is currently taking the following for diabetes: glipizide XL 5 mg daily, metformin 1,000 mg BID, Jardiance 25 mg daily, Trulicity 7.43 mg once weekly. -States he has taken 2 doses of Trulicity so far    He reports signs/sx of hypoglycemia. States one day last week he felt jittery; states he skipped lunch that day; states he had a snack and felt better  -States BG that morning was 93    Last A1C was 6.0 on 7/24/2020. Denies any signs of hyperglycemia. Patient is checking His blood sugar 1-2 times daily and reports the following values:  Date Fasting Before Dinner Bedtime   8/11 98     8/10 88 130    8/9 96 125    8/8 94 129    8/7 89  140   8/6 97     8/5 100     8/4 87  136   -States he checks fasting BG and before dinner    Diet/Exercise:  -States diet has been pretty much the same, but doing smaller portions  -States he has been doubling up with his vegetables    Medication Adherence:  -Patient reports adherence with medications  -Patient denies adverse effects from medications  -States his appetite has decreased since starting Trulicity    Screenings:  Diabetic Foot and Eye Exam HM Status   Topic Date Due    Diabetic Foot Care  01/05/2021    Eye Exam  06/08/2022       Current Outpatient Medications   Medication Sig    fluticasone propionate (FLONASE) 50 mcg/actuation nasal spray 2 Sprays by Both Nostrils route daily.     dulaglutide (Trulicity) 5.64 MG/5.9 mL sub-q pen 0.5 mL by SubCUTAneous route every seven (7) days.  glipiZIDE SR (GLUCOTROL XL) 5 mg CR tablet Take 1 Tab by mouth daily.  metFORMIN (GLUCOPHAGE) 1,000 mg tablet Take 1 Tab by mouth two (2) times daily (with meals). Indications: type 2 diabetes mellitus    losartan (COZAAR) 100 mg tablet Take 1 Tab by mouth daily.  cyanocobalamin (Vitamin B-12) 1,000 mcg tablet Take 1,000 mcg by mouth daily.  empagliflozin (Jardiance) 25 mg tablet Take 1 Tab by mouth daily.  traMADoL (ULTRAM) 50 mg tablet Take 50 mg by mouth every six (6) hours as needed for Pain.  furosemide (LASIX) 20 mg tablet Take 1 Tab by mouth daily.  Blood-Glucose Meter monitoring kit Use to check blood sugar 2 times per day.  glucose blood VI test strips (ASCENSIA AUTODISC VI, ONE TOUCH ULTRA TEST VI) strip Use to check blood sugars twice a day.  lancets misc Use to check blood sugar twice a day    atorvastatin (LIPITOR) 40 mg tablet Take 1 Tab by mouth daily.  folic acid 315 mcg tablet Take 1 Tab by mouth daily. No current facility-administered medications for this visit.         Lab Results   Component Value Date/Time    Hemoglobin A1c 6.0 (H) 07/24/2020 09:46 AM       Lab Results   Component Value Date/Time    Sodium 137 12/18/2019 02:00 PM    Potassium 4.5 12/18/2019 02:00 PM    Chloride 99 12/18/2019 02:00 PM    CO2 24 12/18/2019 02:00 PM    Glucose 299 (H) 12/18/2019 02:00 PM    BUN 29 (H) 12/18/2019 02:00 PM    Creatinine 1.23 12/18/2019 02:00 PM    BUN/Creatinine ratio 24 (H) 12/18/2019 02:00 PM    GFR est AA 87 12/18/2019 02:00 PM    GFR est non-AA 76 12/18/2019 02:00 PM    Calcium 9.7 12/18/2019 02:00 PM       Lab Results   Component Value Date/Time    Cholesterol, total 259 (H) 07/24/2020 09:46 AM    HDL Cholesterol 34 (L) 07/24/2020 09:46 AM    LDL, calculated 156 (H) 07/24/2020 09:46 AM    Triglyceride 347 (H) 07/24/2020 09:46 AM       A/P:    Diabetes:  -Instructed patient to stop glipizide  -Instructed him to continue metformin 1,000 mg BID, Jardiance 25 mg daily, Trulicity 1.72 mg once weekly  -Reiterated importance of continuing to check BG at least 2 times daily and whenever he is not feeling well  -Instructed him to monitor for GI symptoms from Trulicity and contact PCP if these are bothersome    Reviewed with patient signs/sx/treatment of hypoglycemia. Patient verbalized understanding. Will follow up with patient in 1 week for telephone visit. Will route note to PCP.     Codey Johnson, PharmD  Clinical Pharmacist Specialist    CLINICAL PHARMACY CONSULT: MED RECONCILIATION/REVIEW ADDENDUM    For Pharmacy Admin Tracking Only    PHSO: PHSO Patient?: No  Total # of Interventions Recommended: Count: 1  - Discontinued Medication #: 1 Discontinue Reason(s): Patient Preference  Total Interventions Accepted: 1  Time Spent (min): 138 Joanie Garcia, [facility], PharmD

## 2020-08-11 NOTE — TELEPHONE ENCOUNTER
Called patient for PharmD telephone appointment for diabetes follow up. Attempted to reach patient 2 times. No answer, left voicemail requesting patient return call to PharmD.     Aaron Lynn, Ankur  Clinical Pharmacist Specialist

## 2020-08-17 ENCOUNTER — VIRTUAL VISIT (OUTPATIENT)
Dept: FAMILY MEDICINE CLINIC | Age: 35
End: 2020-08-17

## 2020-08-17 DIAGNOSIS — E11.37X9 CONTROLLED TYPE 2 DIABETES MELLITUS WITH DIABETIC MACULAR EDEMA RESOLVED AFTER TREATMENT, WITHOUT LONG-TERM CURRENT USE OF INSULIN, UNSPECIFIED LATERALITY (HCC): Primary | ICD-10-CM

## 2020-08-17 NOTE — PROGRESS NOTES
Pharmacy Note:  Diabetes Follow-up    S/O: Placed an outgoing call to patient to follow-up on SMBG readings and diabetes. Patient referred by Lizzy Reddy MD for diabetes management. Discussed with patient the Pharmacist Collaborative Practice Agreement. Patient provided verbal and/or electronic (ex. mychart) consent to participate in the collaborative practice agreement between the pharmacist and referred patient. This is in lieu of paper consent due to COVID-19 precautions and the use of remote/virtual visits. Patient is currently taking the following for diabetes: metformin 1,000 mg BID, Jardiance 25 mg daily, Trulicity 0.04 mg once weekly. He denies any signs/sx of hypoglycemia. Denies any BG <70. Last A1C was 6.0 on 7/24/2020. Denies polyuria/dipsia/phagia, tingling in fingers and toes  -Reports some blurry vision - states this is improving, he is following up with eye doctor next month  -States Jardiance and Lasix make him urinate; states he drinks plenty of water and denies signs of UTI/yeast infection or dizziness    Patient is checking His blood sugar 1-2 times daily and reports the following values:  Date Fasting After Breakfast Bedtime   8/17  98    8/16   120   8/15 92  120   8/14 101  130   8/13 96     8/12 90  136   8/11 97       Diet/Exercise:  -Denies any big changes in diet since last time  -States he had a little brown rice with chicken for dinner one night; usually doesn't have too many carbs    Medication Adherence:  -Patient reports adherence with medications  -Patient denies adverse effects from medications    Screenings:  Diabetic Foot and Eye Exam HM Status   Topic Date Due    Diabetic Foot Care  01/05/2021    Eye Exam  06/08/2022       Current Outpatient Medications   Medication Sig    fluticasone propionate (FLONASE) 50 mcg/actuation nasal spray 2 Sprays by Both Nostrils route daily.     dulaglutide (Trulicity) 2.17 TH/7.8 mL sub-q pen 0.5 mL by SubCUTAneous route every seven (7) days.  glipiZIDE SR (GLUCOTROL XL) 5 mg CR tablet Take 1 Tab by mouth daily.  metFORMIN (GLUCOPHAGE) 1,000 mg tablet Take 1 Tab by mouth two (2) times daily (with meals). Indications: type 2 diabetes mellitus    losartan (COZAAR) 100 mg tablet Take 1 Tab by mouth daily.  cyanocobalamin (Vitamin B-12) 1,000 mcg tablet Take 1,000 mcg by mouth daily.  empagliflozin (Jardiance) 25 mg tablet Take 1 Tab by mouth daily.  traMADoL (ULTRAM) 50 mg tablet Take 50 mg by mouth every six (6) hours as needed for Pain.  furosemide (LASIX) 20 mg tablet Take 1 Tab by mouth daily.  Blood-Glucose Meter monitoring kit Use to check blood sugar 2 times per day.  glucose blood VI test strips (ASCENSIA AUTODISC VI, ONE TOUCH ULTRA TEST VI) strip Use to check blood sugars twice a day.  lancets misc Use to check blood sugar twice a day    atorvastatin (LIPITOR) 40 mg tablet Take 1 Tab by mouth daily.  folic acid 944 mcg tablet Take 1 Tab by mouth daily. No current facility-administered medications for this visit.         Lab Results   Component Value Date/Time    Hemoglobin A1c 6.0 (H) 07/24/2020 09:46 AM       Lab Results   Component Value Date/Time    Sodium 137 12/18/2019 02:00 PM    Potassium 4.5 12/18/2019 02:00 PM    Chloride 99 12/18/2019 02:00 PM    CO2 24 12/18/2019 02:00 PM    Glucose 299 (H) 12/18/2019 02:00 PM    BUN 29 (H) 12/18/2019 02:00 PM    Creatinine 1.23 12/18/2019 02:00 PM    BUN/Creatinine ratio 24 (H) 12/18/2019 02:00 PM    GFR est AA 87 12/18/2019 02:00 PM    GFR est non-AA 76 12/18/2019 02:00 PM    Calcium 9.7 12/18/2019 02:00 PM       Lab Results   Component Value Date/Time    Cholesterol, total 259 (H) 07/24/2020 09:46 AM    HDL Cholesterol 34 (L) 07/24/2020 09:46 AM    LDL, calculated 156 (H) 07/24/2020 09:46 AM    Triglyceride 347 (H) 07/24/2020 09:46 AM     A/P:    Diabetes:  -Instructed patient to continue metformin 1,000 mg BID, Jardiance 25 mg daily, Trulicity 0.75 mg once weekly  -If he has hypoglycemia, would recommend decreasing Jardiance to 10 mg daily at next visit  -Reiterated importance of checking BG 1-2 daily    Reviewed with patient signs/sx/treatment of hypoglycemia. Patient verbalized understanding. Will follow up with patient in 2 weeks for telephone visit. Will route note to PCP.     Lucius Lanes, PharmD  Clinical Pharmacist Specialist    CLINICAL PHARMACY CONSULT: MED RECONCILIATION/REVIEW ADDENDUM    For Pharmacy Admin Tracking Only    PHSO: PHSO Patient?: No  Total # of Interventions Recommended: Count: 0  Total Interventions Accepted: 0  Time Spent (min): [address] [facility], PharmD

## 2020-08-31 ENCOUNTER — VIRTUAL VISIT (OUTPATIENT)
Dept: FAMILY MEDICINE CLINIC | Age: 35
End: 2020-08-31

## 2020-08-31 DIAGNOSIS — E11.8 TYPE 2 DIABETES MELLITUS WITH COMPLICATION, WITHOUT LONG-TERM CURRENT USE OF INSULIN (HCC): ICD-10-CM

## 2020-08-31 RX ORDER — DULAGLUTIDE 0.75 MG/.5ML
0.75 INJECTION, SOLUTION SUBCUTANEOUS
Qty: 12 PEN | Refills: 3 | Status: SHIPPED | OUTPATIENT
Start: 2020-08-31 | End: 2021-08-09 | Stop reason: ALTCHOICE

## 2020-08-31 NOTE — PROGRESS NOTES
Pharmacy Note:  Diabetes Follow-up    S/O: Placed an outgoing call to patient to follow-up on SMBG readings and diabetes. Patient referred by Elvia Barrios MD for diabetes management. Discussed with patient the Pharmacist Collaborative Practice Agreement. Patient provided verbal and/or electronic (ex. mychart) consent to participate in the collaborative practice agreement between the pharmacist and referred patient. This is in lieu of paper consent due to COVID-19 precautions and the use of remote/virtual visits. States wound on his foot has closed, and he was discharged from [address] [place] [address] today; states he will follow up with them every 2 months. Patient is currently taking the following for diabetes: metformin 1,000 mg BID, Jardiance 25 mg daily, and Trulicity 4.97 mg once weekly. He denies any signs/sx of hypoglycemia. Denies any BG <70. Last A1C was 6.0 on 7/24/2020. Reports polyuria, but on Lasix and Jardiance. Denies any other signs of hyperglycemia. Patient is checking His blood sugar 1-2 times daily and reports the following values:  Date Fasting Bedtime   8/31 93    8/30 100 136   8/29 84 140   8/28 99    8/27 96 142   8/26 101    8/25 106 148   8/24 95 145     Diet/Exercise:  -Denies any big changes in diet  -States he gets full quickly since he started on Trulicity    Medication Adherence:  -Patient reports adherence with medications  -Patient denies adverse effects from medications  -Denies any itching/burning with urination with Jardiance    Screenings:  Diabetic Foot and Eye Exam HM Status   Topic Date Due    Diabetic Foot Care  01/05/2021    Eye Exam  06/08/2022       Current Outpatient Medications   Medication Sig    fluticasone propionate (FLONASE) 50 mcg/actuation nasal spray 2 Sprays by Both Nostrils route daily.  dulaglutide (Trulicity) 3.84 PI/0.7 mL sub-q pen 0.5 mL by SubCUTAneous route every seven (7) days.     metFORMIN (GLUCOPHAGE) 1,000 mg tablet Take 1 Tab by mouth two (2) times daily (with meals). Indications: type 2 diabetes mellitus    losartan (COZAAR) 100 mg tablet Take 1 Tab by mouth daily.  cyanocobalamin (Vitamin B-12) 1,000 mcg tablet Take 1,000 mcg by mouth daily.  empagliflozin (Jardiance) 25 mg tablet Take 1 Tab by mouth daily.  traMADoL (ULTRAM) 50 mg tablet Take 50 mg by mouth every six (6) hours as needed for Pain.  furosemide (LASIX) 20 mg tablet Take 1 Tab by mouth daily.  Blood-Glucose Meter monitoring kit Use to check blood sugar 2 times per day.  glucose blood VI test strips (ASCENSIA AUTODISC VI, ONE TOUCH ULTRA TEST VI) strip Use to check blood sugars twice a day.  lancets misc Use to check blood sugar twice a day    atorvastatin (LIPITOR) 40 mg tablet Take 1 Tab by mouth daily.  folic acid 223 mcg tablet Take 1 Tab by mouth daily. No current facility-administered medications for this visit.         Lab Results   Component Value Date/Time    Hemoglobin A1c 6.0 (H) 07/24/2020 09:46 AM       Lab Results   Component Value Date/Time    Sodium 137 12/18/2019 02:00 PM    Potassium 4.5 12/18/2019 02:00 PM    Chloride 99 12/18/2019 02:00 PM    CO2 24 12/18/2019 02:00 PM    Glucose 299 (H) 12/18/2019 02:00 PM    BUN 29 (H) 12/18/2019 02:00 PM    Creatinine 1.23 12/18/2019 02:00 PM    BUN/Creatinine ratio 24 (H) 12/18/2019 02:00 PM    GFR est AA 87 12/18/2019 02:00 PM    GFR est non-AA 76 12/18/2019 02:00 PM    Calcium 9.7 12/18/2019 02:00 PM       Lab Results   Component Value Date/Time    Cholesterol, total 259 (H) 07/24/2020 09:46 AM    HDL Cholesterol 34 (L) 07/24/2020 09:46 AM    LDL, calculated 156 (H) 07/24/2020 09:46 AM    Triglyceride 347 (H) 07/24/2020 09:46 AM       A/P:    Diabetes:  -Instructed patient to continue metformin 1,000 mg BID, Jardiance 25 mg daily, and   Trulicity 8.20 mg once weekly   -Sent trulicity refill to his pharmacy per his request  -Can increase his Trulicity to 1.5 mg once weekly in the future if needed based on BG control  -Reiterated importance of checking BG 2 times daily and whenever he is not feeling well    Reviewed with patient signs/sx/treatment of hypoglycemia. Patient verbalized understanding. Will have patient follow up with PCP for diabetes since A1C is controlled. Will route note to PCP.     Rah Pedraza, PharmD  Clinical Pharmacist Specialist    CLINICAL PHARMACY CONSULT: MED RECONCILIATION/REVIEW ADDENDUM    For Pharmacy Admin Tracking Only    PHSO: PHSO Patient?: No  Total # of Interventions Recommended: Count: 1  - Refills Provided #: 1  Total Interventions Accepted: 1  Time Spent (min): 138 Joanie Garcia, [facility], PharmD

## 2020-08-31 NOTE — Clinical Note
Audie Ugalde,    I spoke with Mr. Kenny Cain yesterday. He continues to do well on metformin 1000 mg BID, Jardiance 25 mg daily, and Trulicity 4.94 mg once weekly. I had him continue these. He will follow up with you for diabetes since his A1C is controlled.     Kam Clemens, PharmD  Clinical Pharmacist Specialist

## 2020-09-14 DIAGNOSIS — E11.8 TYPE 2 DIABETES MELLITUS WITH COMPLICATION, WITHOUT LONG-TERM CURRENT USE OF INSULIN (HCC): ICD-10-CM

## 2020-09-14 RX ORDER — DULAGLUTIDE 0.75 MG/.5ML
0.75 INJECTION, SOLUTION SUBCUTANEOUS
Qty: 12 PEN | Refills: 3 | Status: CANCELLED | OUTPATIENT
Start: 2020-09-14

## 2020-10-27 DIAGNOSIS — E11.37X9 CONTROLLED TYPE 2 DIABETES MELLITUS WITH DIABETIC MACULAR EDEMA RESOLVED AFTER TREATMENT, WITHOUT LONG-TERM CURRENT USE OF INSULIN, UNSPECIFIED LATERALITY (HCC): ICD-10-CM

## 2020-10-27 DIAGNOSIS — L97.429 DIABETIC ULCER OF LEFT HEEL ASSOCIATED WITH DIABETES MELLITUS DUE TO UNDERLYING CONDITION, UNSPECIFIED ULCER STAGE (HCC): ICD-10-CM

## 2020-10-27 DIAGNOSIS — E08.621 DIABETIC ULCER OF LEFT HEEL ASSOCIATED WITH DIABETES MELLITUS DUE TO UNDERLYING CONDITION, UNSPECIFIED ULCER STAGE (HCC): ICD-10-CM

## 2020-10-28 RX ORDER — EMPAGLIFLOZIN 25 MG/1
TABLET, FILM COATED ORAL
Qty: 30 TAB | Refills: 5 | Status: SHIPPED | OUTPATIENT
Start: 2020-10-28 | End: 2021-04-22

## 2020-10-30 ENCOUNTER — APPOINTMENT (OUTPATIENT)
Dept: INTERNAL MEDICINE CLINIC | Age: 35
End: 2020-10-30

## 2020-10-30 DIAGNOSIS — E11.37X9 CONTROLLED TYPE 2 DIABETES MELLITUS WITH DIABETIC MACULAR EDEMA RESOLVED AFTER TREATMENT, WITHOUT LONG-TERM CURRENT USE OF INSULIN, UNSPECIFIED LATERALITY (HCC): ICD-10-CM

## 2020-10-30 DIAGNOSIS — E78.5 HYPERLIPIDEMIA LDL GOAL <70: ICD-10-CM

## 2020-10-30 DIAGNOSIS — I10 ESSENTIAL HYPERTENSION: ICD-10-CM

## 2020-10-31 LAB
CHOLEST SERPL-MCNC: 256 MG/DL (ref 100–199)
HDLC SERPL-MCNC: 35 MG/DL
INTERPRETATION, 910389: NORMAL
LDLC SERPL CALC-MCNC: 151 MG/DL (ref 0–99)
TRIGL SERPL-MCNC: 371 MG/DL (ref 0–149)
VLDLC SERPL CALC-MCNC: 70 MG/DL (ref 5–40)

## 2020-11-04 ENCOUNTER — VIRTUAL VISIT (OUTPATIENT)
Dept: INTERNAL MEDICINE CLINIC | Age: 35
End: 2020-11-04
Payer: MEDICAID

## 2020-11-04 DIAGNOSIS — E78.5 HYPERLIPIDEMIA LDL GOAL <70: Primary | ICD-10-CM

## 2020-11-04 DIAGNOSIS — T78.40XA ALLERGIC REACTION, INITIAL ENCOUNTER: ICD-10-CM

## 2020-11-04 DIAGNOSIS — E11.37X9 CONTROLLED TYPE 2 DIABETES MELLITUS WITH DIABETIC MACULAR EDEMA RESOLVED AFTER TREATMENT, WITHOUT LONG-TERM CURRENT USE OF INSULIN, UNSPECIFIED LATERALITY (HCC): ICD-10-CM

## 2020-11-04 DIAGNOSIS — I10 ESSENTIAL HYPERTENSION: ICD-10-CM

## 2020-11-04 PROCEDURE — 99214 OFFICE O/P EST MOD 30 MIN: CPT | Performed by: INTERNAL MEDICINE

## 2020-11-04 RX ORDER — EZETIMIBE 10 MG/1
10 TABLET ORAL DAILY
Qty: 90 TAB | Refills: 3 | Status: SHIPPED | OUTPATIENT
Start: 2020-11-04 | End: 2021-10-24

## 2020-11-04 RX ORDER — EPINEPHRINE 0.3 MG/.3ML
0.3 INJECTION SUBCUTANEOUS
Qty: 1 SYRINGE | Refills: 1 | Status: SHIPPED | OUTPATIENT
Start: 2020-11-04 | End: 2020-11-04

## 2020-11-04 NOTE — PROGRESS NOTES
Rob Gutierrez is a 38 y.o. male who was seen by synchronous (real-time) audio-video technology on 11/4/2020. Assessment & Plan:   1. Type 2 DM:+HTN. We will have him schedule in our office for an A1c check. We will check labs again in 3 months. We will follow-up with him in March for an office appointment  [facility] [place] with Rx as prescribed. I encouraged him to maintain a low-carb diet. 2.  Hyperlipidemia: LDL is still not at goal.  Continue with Lipitor. Adding Zetia. Checking labs in 3 months. 3.  Allergic reaction:  Notify me if symptoms recur. At that time, I would recommend allergy testing. Ordering an EpiPen. Okay to use Benadryl as needed for any pruritus. Checking a CBC in 3 months. 4. Health Maintenance:  We will schedule him for his flu vaccine in our office. Lab review: labs are reviewed in the EHR and ordered as mentioned above     I have discussed the diagnosis with the patient and the intended plan as seen in the above orders. I have discussed medication side effects and warnings with the patient as well. I have reviewed the plan of care with the patient, accepted their input and they are in agreement with the treatment goals. All questions were answered. The patient understands the plan of care. Pt instructed if symptoms worsen to call the office or report to the ED for continued care. Greater than 50% of the visit time was spent in counseling and/or coordination of care. Voice recognition was used to generate this report, which may have resulted in some phonetic based errors in grammar and contents. Even though attempts were made to correct all the mistakes, some may have been missed, and remained in the body of the document.       Subjective:   Rob Gutierrez was seen for   Chief Complaint   Patient presents with    Follow-up     The patient is a 34-year-old male with history of hypertension, type 2 diabetes, lumbar facet arthropathy, obesity, left transmetatarsal amputation secondary to a diabetic foot infection, and HLD. 1. Type 2 DM: His most recent labs show: last A1C was 6. His A1C was not checked last month. +Jardaince, Trulicity, and metformin. +ARB given h/o HTN. 2. HLD: His labs show no change in his LDL. His LDL is in the 150s. Taking: lipitor. No adverse side effects from this rx. 3. Allergic Reaction: He had swelling along his eyes and hives after eating belvita (blueberry flavored). Sx resolved with taking benadryl. He occasionally will have pruritus along his arms since the event mentioned above. Prior to Admission medications    Medication Sig Start Date End Date Taking? Authorizing Provider   Jardiance 25 mg tablet Take 1 tablet by mouth once daily 10/28/20   Neelima Pozo MD   furosemide (LASIX) 20 mg tablet Take 1 tablet by mouth once daily 9/22/20   Neelima Pozo MD   dulaglutide (Trulicity) 5.23 OO/1.3 mL sub-q pen 0.5 mL by SubCUTAneous route every seven (7) days. 8/31/20   Neelima Pozo MD   fluticasone propionate (FLONASE) 50 mcg/actuation nasal spray 2 Sprays by Both Nostrils route daily. 7/31/20   Neelima Pozo MD   metFORMIN (GLUCOPHAGE) 1,000 mg tablet Take 1 Tab by mouth two (2) times daily (with meals). Indications: type 2 diabetes mellitus 7/7/20   Neelima Pozo MD   losartan (COZAAR) 100 mg tablet Take 1 Tab by mouth daily. 7/7/20   Neelima Pozo MD   cyanocobalamin (Vitamin B-12) 1,000 mcg tablet Take 1,000 mcg by mouth daily. Provider, Historical   traMADoL (ULTRAM) 50 mg tablet Take 50 mg by mouth every six (6) hours as needed for Pain. Provider, Historical   Blood-Glucose Meter monitoring kit Use to check blood sugar 2 times per day. 1/10/20   Neelima Pozo MD   glucose blood VI test strips (ASCENSIA AUTODISC VI, ONE TOUCH ULTRA TEST VI) strip Use to check blood sugars twice a day.  1/10/20   Neelima Pozo MD   lancets misc Use to check blood sugar twice a day 1/10/20   Yris Bran MD   atorvastatin (LIPITOR) 40 mg tablet Take 1 Tab by mouth daily. 1/1/20   Yris Bran MD   folic acid 763 mcg tablet Take 1 Tab by mouth daily. 12/14/18   Geetha Macias NP     Allergies   Allergen Reactions    Amlodipine Swelling     Had swelling in his legs and feet - states was painful to walk     Past Medical History:   Diagnosis Date    Diabetes ([address]     Hypertension      Past Surgical History:   Procedure Laterality Date    HX AMPUTATION TOE       No family history on file. Social History     Socioeconomic History    Marital status:      Spouse name: Not on file    Number of children: Not on file    Years of education: Not on file    Highest education level: Not on file   Tobacco Use    Smoking status: Never Smoker    Smokeless tobacco: Never Used   Substance and Sexual Activity    Alcohol use: No    Drug use: No    Sexual activity: Yes     Partners: Female       ROS:  Gen: No fever/chills  HEENT: No sore throat, eye pain, ear pain, or congestion.  No HA  CV: No CP  Resp: No cough/SOB  GI: No abdominal pain  : No hematuria/dysuria  Derm: See HPI  Neuro: No new paresthesias/weakness  Musc: No new myalgias/jt pain  Psych: No depression sx      Objective:     General: alert, cooperative, no distress   Mental  status: mental status: alert, oriented to person, place, and time, normal mood, behavior, speech, dress, motor activity, and thought processes   Resp: resp: normal effort and no respiratory distress   Neuro: neuro: no gross deficits   Skin: skin: no discoloration or lesions of concern on visible areas     LABS:  Lab Results   Component Value Date/Time    Sodium 137 12/18/2019 02:00 PM    Potassium 4.5 12/18/2019 02:00 PM    Chloride 99 12/18/2019 02:00 PM    CO2 24 12/18/2019 02:00 PM    Glucose 299 (H) 12/18/2019 02:00 PM    BUN 29 (H) 12/18/2019 02:00 PM    Creatinine 1.23 12/18/2019 02:00 PM    BUN/Creatinine ratio 24 (H) 12/18/2019 02:00 PM    GFR est AA 87 12/18/2019 02:00 PM    GFR est non-AA 76 12/18/2019 02:00 PM    Calcium 9.7 12/18/2019 02:00 PM       Lab Results   Component Value Date/Time    Cholesterol, total 256 (H) 10/30/2020 10:01 AM    HDL Cholesterol 35 (L) 10/30/2020 10:01 AM    LDL, calculated 156 (H) 07/24/2020 09:46 AM    LDL Chol Calc (NIH) 151 (H) 10/30/2020 10:01 AM    VLDL, calculated 69 (H) 07/24/2020 09:46 AM    VLDL Cholesterol Jordon 70 (H) 10/30/2020 10:01 AM    Triglyceride 371 (H) 10/30/2020 10:01 AM       Lab Results   Component Value Date/Time    WBC 9.6 12/18/2019 02:00 PM    HGB 12.1 (L) 12/18/2019 02:00 PM    HCT 36.1 (L) 12/18/2019 02:00 PM    PLATELET 161 67/58/1644 02:00 PM    MCV 79 12/18/2019 02:00 PM       Lab Results   Component Value Date/Time    Hemoglobin A1c 6.0 (H) 07/24/2020 09:46 AM       Lab Results   Component Value Date/Time    TSH 1.990 12/18/2019 02:00 PM           Due to this being a TeleHealth  evaluation, many elements of the physical examination are unable to be assessed. The pt was seen by synchronous (real-time) audio-video technology, and/or her healthcare decision maker, is aware that this patient-initiated, Telehealth encounter is a billable service, with coverage as determined by her insurance carrier. She is aware that she may receive a bill and has provided verbal consent to proceed: Yes. The pt is being evaluated by a video visit encounter for concerns as above. A caregiver was present when appropriate. Due to this being a TeleHealth encounter (During WXWPT-36 public health emergency), evaluation of the following organ systems was limited: Vitals/Constitutional/EENT/Resp/CV/GI//MS/Neuro/Skin/Heme-Lymph-Imm.    Pursuant to the emergency declaration under the [facility] waiver authority and the [employer] and [employer] Act, this Virtual  Visit was conducted, with patient's (and/or legal guardian's) consent, to reduce the patient's risk of exposure to COVID-19 and provide necessary medical care. Services were provided through a video synchronous discussion virtually to substitute for in-person clinic visit. Patient and provider were located at their individual homes. We discussed the expected course, resolution and complications of the diagnosis(es) in detail. Medication risks, benefits, costs, interactions, and alternatives were discussed as indicated. I advised him to contact the office if his condition worsens, changes or fails to improve as anticipated. He expressed understanding with the diagnosis(es) and plan.      Morgan Arellano MD

## 2020-11-05 ENCOUNTER — TELEPHONE (OUTPATIENT)
Dept: INTERNAL MEDICINE CLINIC | Age: 35
End: 2020-11-05

## 2020-11-05 NOTE — TELEPHONE ENCOUNTER
LMTRC-pt needs to sched appt for labs , along with flu shot and pneumonia -  He needs to a 30 ov in march with labs 1 week prior

## 2020-11-10 ENCOUNTER — DOCUMENTATION ONLY (OUTPATIENT)
Dept: INTERNAL MEDICINE CLINIC | Age: 35
End: 2020-11-10

## 2020-11-10 ENCOUNTER — TELEPHONE (OUTPATIENT)
Dept: INTERNAL MEDICINE CLINIC | Age: 35
End: 2020-11-10

## 2020-11-10 NOTE — PROGRESS NOTES
Received notification from [place] [id number] approved epipen 2 saba 0.3 mg auto-injct 11-9-20 thru 11-9-21. Faxed to pharmacy.

## 2020-11-10 NOTE — TELEPHONE ENCOUNTER
[address] called said they receved Prior Auth for epiepen have a question said they  Received a Prior Auth for epipen . 3mg 1 pen , she said they only have 2 pack

## 2020-12-03 ENCOUNTER — CLINICAL SUPPORT (OUTPATIENT)
Dept: INTERNAL MEDICINE CLINIC | Age: 35
End: 2020-12-03
Payer: MEDICAID

## 2020-12-03 ENCOUNTER — APPOINTMENT (OUTPATIENT)
Dept: INTERNAL MEDICINE CLINIC | Age: 35
End: 2020-12-03

## 2020-12-03 DIAGNOSIS — Z23 NEEDS FLU SHOT: Primary | ICD-10-CM

## 2020-12-03 PROCEDURE — 90686 IIV4 VACC NO PRSV 0.5 ML IM: CPT | Performed by: INTERNAL MEDICINE

## 2020-12-03 PROCEDURE — 90471 IMMUNIZATION ADMIN: CPT | Performed by: INTERNAL MEDICINE

## 2020-12-03 NOTE — PATIENT INSTRUCTIONS
Vaccine Information Statement    Influenza (Flu) Vaccine (Inactivated or Recombinant): What You Need to Know    Many Vaccine Information Statements are available in [place] and other languages. See www.immunize.org/vis  Hojas de información sobre vacunas están disponibles en español y en muchos otros idiomas. Visite www.immunize.org/vis    1. Why get vaccinated? Influenza vaccine can prevent influenza (flu). Flu is a contagious disease that spreads around the [facility] every year, usually between October and May. Anyone can get the flu, but it is more dangerous for some people. Infants and young children, people 72years of age and older, pregnant women, and people with certain health conditions or a weakened immune system are at greatest risk of flu complications. Pneumonia, bronchitis, sinus infections and ear infections are examples of flu-related complications. If you have a medical condition, such as heart disease, cancer or diabetes, flu can make it worse. Flu can cause fever and chills, sore throat, muscle aches, fatigue, cough, headache, and runny or stuffy nose. Some people may have vomiting and diarrhea, though this is more common in children than adults. Each year thousands of people in the [facility] die from flu, and many more are hospitalized. Flu vaccine prevents millions of illnesses and flu-related visits to the doctor each year. 2. Influenza vaccines     CDC recommends everyone 10months of age and older get vaccinated every flu season. Children 6 months through 6years of age may need 2 doses during a single flu season. Everyone else needs only 1 dose each flu season. It takes about 2 weeks for protection to develop after vaccination. There are many flu viruses, and they are always changing. Each year a new flu vaccine is made to protect against three or four viruses that are likely to cause disease in the upcoming flu season.  Even when the vaccine doesnt exactly match these viruses, it may still provide some protection. Influenza vaccine does not cause flu. Influenza vaccine may be given at the same time as other vaccines. 3. Talk with your health care provider    Tell your vaccine provider if the person getting the vaccine:   Has had an allergic reaction after a previous dose of influenza vaccine, or has any severe, life-threatening allergies.  Has ever had Guillain-Barré Syndrome (also called GBS). In some cases, your health care provider may decide to postpone influenza vaccination to a future visit. People with minor illnesses, such as a cold, may be vaccinated. People who are moderately or severely ill should usually wait until they recover before getting influenza vaccine. Your health care provider can give you more information. 4. Risks of a reaction     Soreness, redness, and swelling where shot is given, fever, muscle aches, and headache can happen after influenza vaccine.  There may be a very small increased risk of Guillain-Barré Syndrome (GBS) after inactivated influenza vaccine (the flu shot). Beatriz Issa children who get the flu shot along with pneumococcal vaccine (PCV13), and/or DTaP vaccine at the same time might be slightly more likely to have a seizure caused by fever. Tell your health care provider if a child who is getting flu vaccine has ever had a seizure. People sometimes faint after medical procedures, including vaccination. Tell your provider if you feel dizzy or have vision changes or ringing in the ears. As with any medicine, there is a very remote chance of a vaccine causing a severe allergic reaction, other serious injury, or death. 5. What if there is a serious problem? An allergic reaction could occur after the vaccinated person leaves the clinic.  If you see signs of a severe allergic reaction (hives, swelling of the face and throat, difficulty breathing, a fast heartbeat, dizziness, or weakness), call 9-1-1 and get the person to the nearest hospital.    For other signs that concern you, call your health care provider. Adverse reactions should be reported to the Vaccine Adverse Event Reporting System (VAERS). Your health care provider will usually file this report, or you can do it yourself. Visit the VAERS website at www.vaers. [facility].gov or call 9-704.943.9620. VAERS is only for reporting reactions, and VAERS staff do not give medical advice. 6. The National Vaccine Injury Compensation Program    The [facility] Vaccine Injury Compensation Program (VICP) is a federal program that was created to compensate people who may have been injured by certain vaccines. Visit the VICP website at www.[facility].gov/vaccinecompensation or call 9-323.732.4067 to learn about the program and about filing a claim. There is a time limit to file a claim for compensation. 7. How can I learn more?  Ask your health care provider.  Call your local or state health department.  Contact the Centers for Disease Control and Prevention (CDC):  - Call 6-347.929.2232 (1-800-CDC-INFO) or  - Visit CDCs influenza website at www.cdc.gov/flu    Vaccine Information Statement (Interim)  Inactivated Influenza Vaccine   8/15/2019  [address] 322LZ-40   Department of Health and Human Services  Centers for Disease Control and Prevention    Office Use Only

## 2020-12-03 NOTE — PROGRESS NOTES
Ada Lux 1985 male who presents for routine immunizations. Patient denies any symptoms , reactions or allergies that would exclude them from being immunized today. Risks and adverse reactions were discussed and the VIS was given to them. All questions were addressed. Order placed for flu vaccine,  per Verbal Order from  with read back.   Patient declined observation  Marcelina Felipe LPN

## 2020-12-04 LAB — HBA1C MFR BLD: 6.6 % (ref 4.8–5.6)

## 2020-12-07 NOTE — PROGRESS NOTES
Please let him know that his A1C is 6.6, up from 6 four months ago. He needs to c/w rx as prescribed and reduce his carbs in order to control his diabetes. Please make sure he is scheduled to see me in the office for a 30 min visit in March.     Dr. Tyesha Sandoval  [facility], [address] [place] [address], [address] [place] [address] Str.  Phone: [phone]  Fax: [phone]

## 2021-02-04 DIAGNOSIS — E78.5 HYPERLIPIDEMIA LDL GOAL <70: ICD-10-CM

## 2021-02-04 DIAGNOSIS — E11.37X9 CONTROLLED TYPE 2 DIABETES MELLITUS WITH DIABETIC MACULAR EDEMA RESOLVED AFTER TREATMENT, WITHOUT LONG-TERM CURRENT USE OF INSULIN, UNSPECIFIED LATERALITY (HCC): ICD-10-CM

## 2021-02-04 DIAGNOSIS — I10 ESSENTIAL HYPERTENSION: ICD-10-CM

## 2021-02-04 DIAGNOSIS — T78.40XA ALLERGIC REACTION, INITIAL ENCOUNTER: ICD-10-CM

## 2021-03-23 ENCOUNTER — APPOINTMENT (OUTPATIENT)
Dept: INTERNAL MEDICINE CLINIC | Age: 36
End: 2021-03-23

## 2021-03-24 LAB
ALBUMIN SERPL-MCNC: 3.7 G/DL (ref 4–5)
ALBUMIN/GLOB SERPL: 1.3 {RATIO} (ref 1.2–2.2)
ALP SERPL-CCNC: 86 IU/L (ref 39–117)
ALT SERPL-CCNC: 14 IU/L (ref 0–44)
AST SERPL-CCNC: 16 IU/L (ref 0–40)
BASOPHILS # BLD AUTO: 0.1 X10E3/UL (ref 0–0.2)
BASOPHILS NFR BLD AUTO: 1 %
BILIRUB SERPL-MCNC: 0.3 MG/DL (ref 0–1.2)
BUN SERPL-MCNC: 30 MG/DL (ref 6–20)
BUN/CREAT SERPL: 16 (ref 9–20)
CALCIUM SERPL-MCNC: 9 MG/DL (ref 8.7–10.2)
CHLORIDE SERPL-SCNC: 106 MMOL/L (ref 96–106)
CHOLEST SERPL-MCNC: 183 MG/DL (ref 100–199)
CO2 SERPL-SCNC: 25 MMOL/L (ref 20–29)
CREAT SERPL-MCNC: 1.92 MG/DL (ref 0.76–1.27)
EOSINOPHIL # BLD AUTO: 0.5 X10E3/UL (ref 0–0.4)
EOSINOPHIL NFR BLD AUTO: 4 %
ERYTHROCYTE [DISTWIDTH] IN BLOOD BY AUTOMATED COUNT: 13.7 % (ref 11.6–15.4)
GLOBULIN SER CALC-MCNC: 2.9 G/DL (ref 1.5–4.5)
GLUCOSE SERPL-MCNC: 128 MG/DL (ref 65–99)
HBA1C MFR BLD: 7.1 % (ref 4.8–5.6)
HCT VFR BLD AUTO: 38.8 % (ref 37.5–51)
HDLC SERPL-MCNC: 36 MG/DL
HGB BLD-MCNC: 12.5 G/DL (ref 13–17.7)
IMM GRANULOCYTES # BLD AUTO: 0 X10E3/UL (ref 0–0.1)
IMM GRANULOCYTES NFR BLD AUTO: 0 %
IMP & REVIEW OF LAB RESULTS: NORMAL
INTERPRETATION: NORMAL
LDLC SERPL CALC-MCNC: 111 MG/DL (ref 0–99)
LYMPHOCYTES # BLD AUTO: 3.1 X10E3/UL (ref 0.7–3.1)
LYMPHOCYTES NFR BLD AUTO: 29 %
Lab: NORMAL
MCH RBC QN AUTO: 26 PG (ref 26.6–33)
MCHC RBC AUTO-ENTMCNC: 32.2 G/DL (ref 31.5–35.7)
MCV RBC AUTO: 81 FL (ref 79–97)
MONOCYTES # BLD AUTO: 0.6 X10E3/UL (ref 0.1–0.9)
MONOCYTES NFR BLD AUTO: 6 %
NEUTROPHILS # BLD AUTO: 6.4 X10E3/UL (ref 1.4–7)
NEUTROPHILS NFR BLD AUTO: 60 %
PDF IMAGE, 910387: NORMAL
PLATELET # BLD AUTO: 358 X10E3/UL (ref 150–450)
POTASSIUM SERPL-SCNC: 4.7 MMOL/L (ref 3.5–5.2)
PROT SERPL-MCNC: 6.6 G/DL (ref 6–8.5)
RBC # BLD AUTO: 4.8 X10E6/UL (ref 4.14–5.8)
SODIUM SERPL-SCNC: 144 MMOL/L (ref 134–144)
TRIGL SERPL-MCNC: 206 MG/DL (ref 0–149)
VLDLC SERPL CALC-MCNC: 36 MG/DL (ref 5–40)
WBC # BLD AUTO: 10.7 X10E3/UL (ref 3.4–10.8)

## 2021-03-26 DIAGNOSIS — N17.9 AKI (ACUTE KIDNEY INJURY) (HCC): ICD-10-CM

## 2021-03-26 DIAGNOSIS — N17.9 AKI (ACUTE KIDNEY INJURY) (HCC): Primary | ICD-10-CM

## 2021-03-26 NOTE — PROGRESS NOTES
Patient contacted, patient identified with two identifiers (Name & ). Patient aware of results per DR. Hobbs and verbalizes understanding. Patient scheduled for labs next week.

## 2021-03-29 ENCOUNTER — TELEPHONE (OUTPATIENT)
Dept: INTERNAL MEDICINE CLINIC | Age: 36
End: 2021-03-29

## 2021-03-29 NOTE — TELEPHONE ENCOUNTER
Patient wanted to tell Dr Ned Sicard that he has been drinking a lot of alkaline water.  Patient stated this may be why his kidney lab results were off

## 2021-03-30 ENCOUNTER — APPOINTMENT (OUTPATIENT)
Dept: INTERNAL MEDICINE CLINIC | Age: 36
End: 2021-03-30

## 2021-03-31 ENCOUNTER — TELEPHONE (OUTPATIENT)
Dept: INTERNAL MEDICINE CLINIC | Age: 36
End: 2021-03-31

## 2021-03-31 ENCOUNTER — HOSPITAL ENCOUNTER (EMERGENCY)
Age: 36
Discharge: HOME | End: 2021-03-31
Attending: EMERGENCY MEDICINE
Payer: MEDICAID

## 2021-03-31 VITALS
TEMPERATURE: 98.3 F | WEIGHT: 290 LBS | OXYGEN SATURATION: 100 % | HEART RATE: 118 BPM | DIASTOLIC BLOOD PRESSURE: 89 MMHG | SYSTOLIC BLOOD PRESSURE: 172 MMHG | RESPIRATION RATE: 18 BRPM | BODY MASS INDEX: 46.61 KG/M2 | HEIGHT: 66 IN

## 2021-03-31 DIAGNOSIS — N17.9 AKI (ACUTE KIDNEY INJURY) (HCC): Primary | ICD-10-CM

## 2021-03-31 DIAGNOSIS — R80.9 PROTEINURIA, UNSPECIFIED TYPE: ICD-10-CM

## 2021-03-31 LAB
ALBUMIN SERPL-MCNC: 3.4 G/DL (ref 3.4–5)
ALBUMIN SERPL-MCNC: 3.8 G/DL (ref 4–5)
ALBUMIN/GLOB SERPL: 0.7 {RATIO} (ref 0.8–1.7)
ALBUMIN/GLOB SERPL: 1.2 {RATIO} (ref 1.2–2.2)
ALP SERPL-CCNC: 77 IU/L (ref 39–117)
ALP SERPL-CCNC: 88 U/L (ref 45–117)
ALT SERPL-CCNC: 17 IU/L (ref 0–44)
ALT SERPL-CCNC: 29 U/L (ref 16–61)
ANION GAP SERPL CALC-SCNC: 5 MMOL/L (ref 3–18)
ANION GAP SERPL CALC-SCNC: 8 MMOL/L (ref 3–18)
APPEARANCE UR: CLEAR
APPEARANCE UR: CLEAR
AST SERPL-CCNC: 17 IU/L (ref 0–40)
AST SERPL-CCNC: 17 U/L (ref 10–38)
BACTERIA #/AREA URNS HPF: ABNORMAL /[HPF]
BASOPHILS # BLD: 0 K/UL (ref 0–0.1)
BASOPHILS NFR BLD: 0 % (ref 0–2)
BILIRUB SERPL-MCNC: 0.4 MG/DL (ref 0.2–1)
BILIRUB SERPL-MCNC: 0.4 MG/DL (ref 0–1.2)
BILIRUB UR QL STRIP: NEGATIVE
BILIRUB UR QL: NEGATIVE
BUN SERPL-MCNC: 38 MG/DL (ref 7–18)
BUN SERPL-MCNC: 38 MG/DL (ref 7–18)
BUN SERPL-MCNC: 43 MG/DL (ref 6–20)
BUN/CREAT SERPL: 18 (ref 12–20)
BUN/CREAT SERPL: 21 (ref 12–20)
BUN/CREAT SERPL: 21 (ref 9–20)
CALCIUM SERPL-MCNC: 7.8 MG/DL (ref 8.5–10.1)
CALCIUM SERPL-MCNC: 9.1 MG/DL (ref 8.5–10.1)
CALCIUM SERPL-MCNC: 9.8 MG/DL (ref 8.7–10.2)
CASTS URNS QL MICRO: ABNORMAL /LPF
CHLORIDE SERPL-SCNC: 102 MMOL/L (ref 96–106)
CHLORIDE SERPL-SCNC: 105 MMOL/L (ref 100–111)
CHLORIDE SERPL-SCNC: 112 MMOL/L (ref 100–111)
CO2 SERPL-SCNC: 22 MMOL/L (ref 20–29)
CO2 SERPL-SCNC: 26 MMOL/L (ref 21–32)
CO2 SERPL-SCNC: 28 MMOL/L (ref 21–32)
COLOR UR: YELLOW
COLOR UR: YELLOW
CREAT SERPL-MCNC: 1.84 MG/DL (ref 0.6–1.3)
CREAT SERPL-MCNC: 2.06 MG/DL (ref 0.76–1.27)
CREAT SERPL-MCNC: 2.12 MG/DL (ref 0.6–1.3)
CRYSTALS URNS MICRO: ABNORMAL
DIFFERENTIAL METHOD BLD: ABNORMAL
EOSINOPHIL # BLD: 0.4 K/UL (ref 0–0.4)
EOSINOPHIL NFR BLD: 4 % (ref 0–5)
EPI CELLS #/AREA URNS HPF: ABNORMAL /HPF (ref 0–10)
ERYTHROCYTE [DISTWIDTH] IN BLOOD BY AUTOMATED COUNT: 13.8 % (ref 11.6–14.5)
GLOBULIN SER CALC-MCNC: 3.1 G/DL (ref 1.5–4.5)
GLOBULIN SER CALC-MCNC: 4.8 G/DL (ref 2–4)
GLUCOSE SERPL-MCNC: 113 MG/DL (ref 65–99)
GLUCOSE SERPL-MCNC: 121 MG/DL (ref 74–99)
GLUCOSE SERPL-MCNC: 122 MG/DL (ref 74–99)
GLUCOSE UR QL: ABNORMAL
GLUCOSE UR STRIP.AUTO-MCNC: >1000 MG/DL
HCT VFR BLD AUTO: 42.4 % (ref 36–48)
HGB BLD-MCNC: 13.8 G/DL (ref 13–16)
HGB UR QL STRIP: ABNORMAL
HGB UR QL STRIP: ABNORMAL
INTERPRETATION: NORMAL
KETONES UR QL STRIP.AUTO: NEGATIVE MG/DL
KETONES UR QL STRIP: NEGATIVE
LEUKOCYTE ESTERASE UR QL STRIP.AUTO: NEGATIVE
LEUKOCYTE ESTERASE UR QL STRIP: NEGATIVE
LYMPHOCYTES # BLD: 4.2 K/UL (ref 0.9–3.6)
LYMPHOCYTES NFR BLD: 35 % (ref 21–52)
MAGNESIUM SERPL-MCNC: 2.1 MG/DL (ref 1.6–2.6)
MCH RBC QN AUTO: 26.4 PG (ref 24–34)
MCHC RBC AUTO-ENTMCNC: 32.5 G/DL (ref 31–37)
MCV RBC AUTO: 81.2 FL (ref 74–97)
MICRO URNS: ABNORMAL
MONOCYTES # BLD: 0.9 K/UL (ref 0.05–1.2)
MONOCYTES NFR BLD: 7 % (ref 3–10)
NEUTS SEG # BLD: 6.6 K/UL (ref 1.8–8)
NEUTS SEG NFR BLD: 54 % (ref 40–73)
NITRITE UR QL STRIP.AUTO: NEGATIVE
NITRITE UR QL STRIP: NEGATIVE
PH UR STRIP: 5.5 [PH] (ref 5–7.5)
PH UR STRIP: 5.5 [PH] (ref 5–8)
PLATELET # BLD AUTO: 426 K/UL (ref 135–420)
PMV BLD AUTO: 10 FL (ref 9.2–11.8)
POTASSIUM SERPL-SCNC: 4.1 MMOL/L (ref 3.5–5.5)
POTASSIUM SERPL-SCNC: 4.2 MMOL/L (ref 3.5–5.5)
POTASSIUM SERPL-SCNC: 5 MMOL/L (ref 3.5–5.2)
PROT SERPL-MCNC: 6.9 G/DL (ref 6–8.5)
PROT SERPL-MCNC: 8.2 G/DL (ref 6.4–8.2)
PROT UR QL STRIP: ABNORMAL
PROT UR STRIP-MCNC: >1000 MG/DL
RBC # BLD AUTO: 5.22 M/UL (ref 4.7–5.5)
RBC #/AREA URNS HPF: ABNORMAL /HPF (ref 0–2)
RBC #/AREA URNS HPF: NORMAL /HPF (ref 0–5)
SODIUM SERPL-SCNC: 139 MMOL/L (ref 134–144)
SODIUM SERPL-SCNC: 141 MMOL/L (ref 136–145)
SODIUM SERPL-SCNC: 143 MMOL/L (ref 136–145)
SP GR UR REFRACTOMETRY: 1.02 (ref 1–1.03)
SP GR UR: 1.02 (ref 1–1.03)
UNIDENT CRYS URNS QL MICRO: PRESENT
UROBILINOGEN UR QL STRIP.AUTO: 0.2 EU/DL (ref 0.2–1)
UROBILINOGEN UR STRIP-MCNC: 0.2 MG/DL (ref 0.2–1)
WBC # BLD AUTO: 12.2 K/UL (ref 4.6–13.2)
WBC #/AREA URNS HPF: ABNORMAL /HPF (ref 0–5)
WBC URNS QL MICRO: NORMAL /HPF (ref 0–4)

## 2021-03-31 PROCEDURE — 96360 HYDRATION IV INFUSION INIT: CPT

## 2021-03-31 PROCEDURE — 99284 EMERGENCY DEPT VISIT MOD MDM: CPT

## 2021-03-31 PROCEDURE — 80053 COMPREHEN METABOLIC PANEL: CPT

## 2021-03-31 PROCEDURE — 83735 ASSAY OF MAGNESIUM: CPT

## 2021-03-31 PROCEDURE — 96361 HYDRATE IV INFUSION ADD-ON: CPT

## 2021-03-31 PROCEDURE — 85025 COMPLETE CBC W/AUTO DIFF WBC: CPT

## 2021-03-31 PROCEDURE — 74011250636 HC RX REV CODE- 250/636: Performed by: PHYSICIAN ASSISTANT

## 2021-03-31 PROCEDURE — 81001 URINALYSIS AUTO W/SCOPE: CPT

## 2021-03-31 RX ADMIN — SODIUM CHLORIDE 1000 ML: 900 INJECTION, SOLUTION INTRAVENOUS at 18:09

## 2021-03-31 RX ADMIN — SODIUM CHLORIDE 1000 ML: 900 INJECTION, SOLUTION INTRAVENOUS at 17:26

## 2021-03-31 NOTE — TELEPHONE ENCOUNTER
I instructed him to go to the ED given his ARF. BUN and his creatinine are trending up. He is on multiple meds that are renally cleared. I discussed the need for IVF given labs c/w prerenal azotemia. The patient states that he has no issues with urination. No hematuria or dysuria. His UA results not consistent with a UTI. He states that he has been hydrating well with water.     Dr. Keshawn Kaufman  [facility] [address], [address] [facility] [address]  Phone: [phone]  Fax: [phone]

## 2021-03-31 NOTE — ED PROVIDER NOTES
EMERGENCY DEPARTMENT HISTORY AND PHYSICAL EXAM    5:47 PM      Date: 3/31/2021  Patient Name: Nidhi Arteaga    History of Presenting Illness     Chief Complaint   Patient presents with    Abnormal Lab Results       History Provided By: Patient    Additional History (Context): Nidhi Arteaga is a 38 y.o. male with hx of HTN, DM, HI who presents to the ED due to abnormal lab results. Patient notes his PCP romario labs yesterday, was told to come to the ED due to abnormal kidney function. Denies fever or chills, chest pain, shortness of breath, abdominal pain, flank pain, nausea or vomiting, diarrhea, dysuria, hematuria, urinary urgency. Denies any symptoms. Pt notes he does not have a nephrologist.     PCP: Skylar Zamora MD    Current Outpatient Medications   Medication Sig Dispense Refill    metFORMIN (GLUCOPHAGE) 1,000 mg tablet TAKE 1 TABLET BY MOUTH TWICE DAILY WITH MEALS 180 Tab 1    losartan (COZAAR) 100 mg tablet Take 1 tablet by mouth once daily 90 Tab 1    atorvastatin (LIPITOR) 40 mg tablet Take 1 tablet by mouth once daily 90 Tab 2    ezetimibe (ZETIA) 10 mg tablet Take 1 Tab by mouth daily. 90 Tab 3    Jardiance 25 mg tablet Take 1 tablet by mouth once daily 30 Tab 5    dulaglutide (Trulicity) 9.17 UM/3.8 mL sub-q pen 0.5 mL by SubCUTAneous route every seven (7) days. 12 Pen 3    fluticasone propionate (FLONASE) 50 mcg/actuation nasal spray 2 Sprays by Both Nostrils route daily. 1 Bottle 11    Blood-Glucose Meter monitoring kit Use to check blood sugar 2 times per day. 1 Kit 0    glucose blood VI test strips (ASCENSIA AUTODISC VI, ONE TOUCH ULTRA TEST VI) strip Use to check blood sugars twice a day. 100 Strip 3    lancets misc Use to check blood sugar twice a day 403 Each 3    folic acid 111 mcg tablet Take 1 Tab by mouth daily.  90 Tab 3    furosemide (LASIX) 20 mg tablet Take 1 tablet by mouth once daily 30 Tab 5    traMADoL (ULTRAM) 50 mg tablet Take 50 mg by mouth every six (6) hours as needed for Pain. Past History     Past Medical History:  Past Medical History:   Diagnosis Date    [facility]     Diabetes ([facility]     Hypertension        Past Surgical History:  Past Surgical History:   Procedure Laterality Date    HX AMPUTATION TOE         Family History:  History reviewed. No pertinent family history. Social History:  Social History     Tobacco Use    Smoking status: Never Smoker    Smokeless tobacco: Never Used   Substance Use Topics    Alcohol use: No    Drug use: No       Allergies: Allergies   Allergen Reactions    Amlodipine Swelling     Had swelling in his legs and feet - states was painful to walk         Review of Systems       Review of Systems   Constitutional: Negative for chills and fever. Respiratory: Negative for shortness of breath. Cardiovascular: Negative for chest pain. Gastrointestinal: Negative for abdominal pain, nausea and vomiting. Skin: Negative for rash. Neurological: Negative for weakness. All other systems reviewed and are negative. Physical Exam     Visit Vitals  BP (!) 172/89   Pulse (!) 118   Temp 98.3 °F (36.8 °C)   Resp 18   Ht 5' 6\" (1.676 m)   Wt 131.5 kg (290 lb)   SpO2 100%   BMI 46.81 kg/m²         Physical Exam  Vitals signs and nursing note reviewed. Constitutional:       General: He is not in acute distress. Appearance: He is well-developed. He is not diaphoretic. HENT:      Head: Normocephalic and atraumatic. Neck:      Musculoskeletal: Normal range of motion and neck supple. Cardiovascular:      Rate and Rhythm: Normal rate and regular rhythm. Heart sounds: Normal heart sounds. No murmur. No friction rub. No gallop. Pulmonary:      Effort: Pulmonary effort is normal. No respiratory distress. Breath sounds: Normal breath sounds. No wheezing or rales. Abdominal:      General: Abdomen is flat. There is no distension. Palpations: Abdomen is soft. Tenderness: There is no abdominal tenderness. There is no right CVA tenderness, left CVA tenderness, guarding or rebound. Musculoskeletal: Normal range of motion. Skin:     General: Skin is warm. Findings: No rash. Neurological:      Mental Status: He is alert. Diagnostic Study Results     Labs -  No results found for this or any previous visit (from the past 12 hour(s)). Radiologic Studies -   No orders to display         Medical Decision Making   I am the first provider for this patient. I reviewed the vital signs, available nursing notes, past medical history, past surgical history, family history and social history. Vital Signs-Reviewed the patient's vital signs. Records Reviewed: Nursing Notes and Old Medical Records (Time of Review: 5:47 PM)  3/23 Cr 1.92  3/30 Cr 2.06    ED Course: Progress Notes, Reevaluation, and Consults:  7:02 PM : HR reduced to 89. Cr reduced to 1.84    Reviewed results with patient and wife. Discussed need for close outpatient follow-up with nephrologist this week for reassessment. Discussed strict return precautions, including fever, vomiting, or any other medical concerns. Pt and wife in agreement with plan. Provider Notes (Medical Decision Making): 37 yo M with hx of diabetes, hypertension who presents to the ED due to elevated creatinine. Pt is asymptomatic. Labs demonstrate Cr 2.12, improved with IVF to 1.84. Tolerating PO, no distress. Thorough discussion with patient and wife, will refer to nephrology for close outpatient follow-up for further assessment, strict return precautions provided. Diagnosis     Clinical Impression:   1. HI (acute kidney injury) ([facility] [address]    2.  Proteinuria, unspecified type        Disposition: home     Follow-up Information     Follow up With Specialties Details Why [facility] EMERGENCY DEPT Emergency Medicine  If symptoms worsen [address] [place] [address] Yuly Ryan MD Family Medicine Schedule an appointment as soon as possible for a visit   [place] [address]  [address]      Aurora Auguste MD Nephrology Schedule an appointment as soon as possible for a visit  nephrologist [address] [facility]  [phone]             Discharge Medication List as of 3/31/2021  7:39 PM      CONTINUE these medications which have NOT CHANGED    Details   metFORMIN (GLUCOPHAGE) 1,000 mg tablet TAKE 1 TABLET BY MOUTH TWICE DAILY WITH MEALS, Normal, Disp-180 Tab, R-1      losartan (COZAAR) 100 mg tablet Take 1 tablet by mouth once daily, Normal, Disp-90 Tab, R-1      furosemide (LASIX) 20 mg tablet Take 1 tablet by mouth once daily, Normal, Disp-30 Tab, R-5      atorvastatin (LIPITOR) 40 mg tablet Take 1 tablet by mouth once daily, Normal, Disp-90 Tab, R-2      ezetimibe (ZETIA) 10 mg tablet Take 1 Tab by mouth daily. , Normal, Disp-90 Tab,R-3      Jardiance 25 mg tablet Take 1 tablet by mouth once daily, Normal, Disp-30 Tab,R-5      dulaglutide (Trulicity) 7.30 ZK/6.5 mL sub-q pen 0.5 mL by SubCUTAneous route every seven (7) days. , Normal, Disp-12 Pen,R-3      fluticasone propionate (FLONASE) 50 mcg/actuation nasal spray 2 Sprays by Both Nostrils route daily. , Normal, Disp-1 Bottle,R-11      traMADoL (ULTRAM) 50 mg tablet Take 50 mg by mouth every six (6) hours as needed for Pain., Historical Med      Blood-Glucose Meter monitoring kit Use to check blood sugar 2 times per day., Normal, Disp-1 Kit, R-0      glucose blood VI test strips (ASCENSIA AUTODISC VI, ONE TOUCH ULTRA TEST VI) strip Use to check blood sugars twice a day., Normal, Disp-100 Strip, R-3      lancets misc Use to check blood sugar twice a day, Normal, Disp-100 Each, R-3      folic acid 386 mcg tablet Take 1 Tab by mouth daily. , Normal, Disp-90 Tab, R-3             Dictation disclaimer:  Please note that this dictation was completed with Dragon, the computer voice recognition software. Quite often unanticipated grammatical, syntax, homophones, and other interpretive errors are inadvertently transcribed by the computer software. Please disregard these errors. Please excuse any errors that have escaped final proofreading.

## 2021-03-31 NOTE — ED TRIAGE NOTES
Pt presents by recommendation of PCP to come to ed due to abnormal lab results that pt had drawn yesterday. Per pt told his kidney function labs were abnormal and to come to the ED for IVF therapy. Pt reports hx of acute kidney injury.

## 2021-03-31 NOTE — DISCHARGE INSTRUCTIONS
DO NOT TAKE ANY MOTRIN OR ANTI-INFLAMMATORIES. Follow-up with your primary care physician and nephrologist within 1 week for reassessment. Bring the results from this visit with you for their review. Return to the ED immediately for any new, worsening, or persistent symptoms, including vomiting, abdominal pain, or any other medical concerns.

## 2021-04-05 ENCOUNTER — TELEPHONE (OUTPATIENT)
Dept: INTERNAL MEDICINE CLINIC | Age: 36
End: 2021-04-05

## 2021-04-05 DIAGNOSIS — N17.9 ACUTE RENAL FAILURE, UNSPECIFIED ACUTE RENAL FAILURE TYPE (HCC): ICD-10-CM

## 2021-04-05 DIAGNOSIS — N17.9 ACUTE RENAL FAILURE, UNSPECIFIED ACUTE RENAL FAILURE TYPE (HCC): Primary | ICD-10-CM

## 2021-04-06 ENCOUNTER — HOSPITAL ENCOUNTER (OUTPATIENT)
Dept: ULTRASOUND IMAGING | Age: 36
Discharge: HOME | End: 2021-04-06
Attending: INTERNAL MEDICINE
Payer: MEDICAID

## 2021-04-06 ENCOUNTER — VIRTUAL VISIT (OUTPATIENT)
Dept: INTERNAL MEDICINE CLINIC | Age: 36
End: 2021-04-06
Payer: MEDICAID

## 2021-04-06 DIAGNOSIS — I10 ESSENTIAL HYPERTENSION: ICD-10-CM

## 2021-04-06 DIAGNOSIS — E66.01 OBESITY, MORBID, BMI 40.0-49.9 (HCC): ICD-10-CM

## 2021-04-06 DIAGNOSIS — E11.37X9 CONTROLLED TYPE 2 DIABETES MELLITUS WITH DIABETIC MACULAR EDEMA RESOLVED AFTER TREATMENT, WITHOUT LONG-TERM CURRENT USE OF INSULIN, UNSPECIFIED LATERALITY (HCC): ICD-10-CM

## 2021-04-06 DIAGNOSIS — N17.9 AKI (ACUTE KIDNEY INJURY) (HCC): Primary | ICD-10-CM

## 2021-04-06 DIAGNOSIS — R06.83 SNORING: ICD-10-CM

## 2021-04-06 PROCEDURE — 99214 OFFICE O/P EST MOD 30 MIN: CPT | Performed by: INTERNAL MEDICINE

## 2021-04-06 PROCEDURE — 3051F HG A1C>EQUAL 7.0%<8.0%: CPT | Performed by: INTERNAL MEDICINE

## 2021-04-06 PROCEDURE — 76770 US EXAM ABDO BACK WALL COMP: CPT

## 2021-04-06 NOTE — PROGRESS NOTES
Marcie De La Torre is a 38 y.o. male who was seen by synchronous (real-time) audio-video technology on 4/6/2021. Assessment & Plan:   1. Type 2 Diabetes: Worsened with his A1c measuring 7.1. +Obesity. I stressed the importance of maintaining a low-carb diet. For now, continue with Rx as prescribed. I instructed him to reduce his weight by maintaining a diabetic diet. 2.  HI: Improved with IV fluids. I encouraged him to drink at least 8 bottles of water per day. We discussed how Lasix and Jardiance can predispose 1 to dehydration if they are not hydrating well with water. I discussed the dangers of acute kidney injury. I discussed how his medications would need to be adjusted if he is unable to stay hydrated to prevent acute kidney injury from dehydration. [facility] is scheduled to see Nephrology on Friday. I instructed him to go to this appointment. I also instructed him to notify me if labs are not ordered for later this month. If his nephrologist does not order labs to check his creatinine and BUN, I will order them. Awaiting results of his retroperitoneal ultrasound. 3.  Health Maintenance:  I encouraged him to get his second COVID-19 vaccine. 4.  Hypertension: BP was elevated in the ED. Continue with Rx as prescribed pending his appointment with Nephrology. I will have him return to clinic for an office BP check.  Referral to [facility] Shows for [facility] evaluation given his history of snoring. I instructed him to stay hydrated as mentioned above. Lab review: labs are reviewed in the EHR and ordered as mentioned above. I have discussed the diagnosis with the patient and the intended plan as seen in the above orders. I have discussed medication side effects and warnings with the patient as well. I have reviewed the plan of care with the patient, accepted their input and they are in agreement with the treatment goals. All questions were answered.  The patient understands the plan of care. Pt instructed if symptoms worsen to call the office or report to the ED for continued care. Greater than 50% of the visit time was spent in counseling and/or coordination of care. Voice recognition was used to generate this report, which may have resulted in some phonetic based errors in grammar and contents. Even though attempts were made to correct all the mistakes, some may have been missed, and remained in the body of the document. Subjective:   Ludy Post was seen for   Chief Complaint   Patient presents with    Acute Kidney Injury     The patient is a 69-year-old male with history of hypertension, type 2 diabetes, lumbar facet arthropathy, obesity, left transmetatarsal amputation secondary to a diabetic foot infection, and HLD. 1.  Type 2 Diabetes: Taking Jardiance, Trulicity, and metformin. His most recent labs show that his A1c is 7.1. He has significant glucosuria. He admits that he has been drinking some sweet tea recently. 2. HI: Most recent labs show acute kidney injury, likely from dehydration. His creatinine was in the 2 range. His BUN was elevated. He was sent to the emergency department for evaluation. At that time, a urinalysis was obtained and findings are not consistent with a UTI. Additionally, he was given IV fluids. His creatinine improved with IV fluids. Since then, the patient has been drinking more water. Know that he is both on Jardiance and Lasix as mentioned in #1. He had a retroperitoneal ultrasound earlier today. Findings are pending. He admits that last month, he was only drinking about 2 bottles of water per day, despite taking Lasix and Jardiance. 3.  Hypertension: Taking Lasix and losartan. He has a history of snoring. He has fatigue. He has increased sleepiness. BP Readings from Last 3 Encounters:   03/31/21 (!) 172/89   03/16/20 137/81   01/31/20 132/82     4.  Health Maintenance:  - He had 1 shot of the [employer] or Pfizer vaccine for COVID-19. Prior to Admission medications    Medication Sig Start Date End Date Taking? Authorizing Provider   furosemide (LASIX) 20 mg tablet Take 1 tablet by mouth once daily 3/19/21   Yoon Lopez MD   metFORMIN (GLUCOPHAGE) 1,000 mg tablet TAKE 1 TABLET BY MOUTH TWICE DAILY WITH MEALS 1/4/21   Yoon Lopez MD   losartan (COZAAR) 100 mg tablet Take 1 tablet by mouth once daily 1/4/21   Yoon Lopez MD   atorvastatin (LIPITOR) 40 mg tablet Take 1 tablet by mouth once daily 12/13/20   Yoon Lopez MD   ezetimibe (ZETIA) 10 mg tablet Take 1 Tab by mouth daily. 11/4/20   Yoon Lopez MD   Jardiance 25 mg tablet Take 1 tablet by mouth once daily 10/28/20   Yoon Lopez MD   dulaglutide (Trulicity) 6.51 VV/4.7 mL sub-q pen 0.5 mL by SubCUTAneous route every seven (7) days. 8/31/20   Yoon Lopez MD   fluticasone propionate (FLONASE) 50 mcg/actuation nasal spray 2 Sprays by Both Nostrils route daily. 7/31/20   Yoon Lopez MD   traMADoL Ana Alisha) 50 mg tablet Take 50 mg by mouth every six (6) hours as needed for Pain. Provider, Historical   Blood-Glucose Meter monitoring kit Use to check blood sugar 2 times per day. 1/10/20   Yoon Lopez MD   glucose blood VI test strips (ASCENSIA AUTODISC VI, ONE TOUCH ULTRA TEST VI) strip Use to check blood sugars twice a day. 1/10/20   Yoon Lopez MD   lancets misc Use to check blood sugar twice a day 1/10/20   Yoon Lopez MD   folic acid 556 mcg tablet Take 1 Tab by mouth daily. 12/14/18   Nathaly Macias NP     Allergies   Allergen Reactions    Amlodipine Swelling     Had swelling in his legs and feet - states was painful to walk     Past Medical History:   Diagnosis Date    [facility]     Diabetes ([facility]     Hypertension      Past Surgical History:   Procedure Laterality Date    HX AMPUTATION TOE       No family history on file.   Social History     Socioeconomic History    Marital status:      Spouse name: Not on file    Number of children: Not on file    Years of education: Not on file    Highest education level: Not on file   Tobacco Use    Smoking status: Never Smoker    Smokeless tobacco: Never Used   Substance and Sexual Activity    Alcohol use: No    Drug use: No    Sexual activity: Yes     Partners: Female       ROS:  Gen: No fever/chills  HEENT: No sore throat, eye pain, ear pain, or congestion.  No HA  CV: No CP  Resp: No cough/SOB  GI: No abdominal pain  : No hematuria/dysuria  Derm: No rash  Neuro: No new paresthesias/weakness  Musc: No new myalgias/jt pain  Psych: No depression sx      Objective:     General: alert, cooperative, no distress   Mental  status: mental status: alert, oriented to person, place, and time, normal mood, behavior, speech, dress, motor activity, and thought processes   Resp: resp: normal effort and no respiratory distress   Neuro: neuro: no gross deficits   Skin: skin: no discoloration or lesions of concern on visible areas     LABS:  Lab Results   Component Value Date/Time    Sodium 143 03/31/2021 07:04 PM    Potassium 4.2 03/31/2021 07:04 PM    Chloride 112 (H) 03/31/2021 07:04 PM    CO2 26 03/31/2021 07:04 PM    Anion gap 5 03/31/2021 07:04 PM    Glucose 121 (H) 03/31/2021 07:04 PM    BUN 38 (H) 03/31/2021 07:04 PM    Creatinine 1.84 (H) 03/31/2021 07:04 PM    BUN/Creatinine ratio 21 (H) 03/31/2021 07:04 PM    GFR est AA 51 (L) 03/31/2021 07:04 PM    GFR est non-AA 42 (L) 03/31/2021 07:04 PM    Calcium 7.8 (L) 03/31/2021 07:04 PM       Lab Results   Component Value Date/Time    Cholesterol, total 183 03/23/2021 10:13 AM    HDL Cholesterol 36 (L) 03/23/2021 10:13 AM    LDL, calculated 111 (H) 03/23/2021 10:13 AM    LDL, calculated 156 (H) 07/24/2020 09:46 AM    VLDL, calculated 36 03/23/2021 10:13 AM    VLDL, calculated 69 (H) 07/24/2020 09:46 AM    Triglyceride 206 (H) 03/23/2021 10:13 AM       Lab Results   Component Value Date/Time    WBC 12.2 03/31/2021 05:22 PM    HGB 13.8 03/31/2021 05:22 PM    HCT 42.4 03/31/2021 05:22 PM    PLATELET 787 (H) 67/75/1910 05:22 PM    MCV 81.2 03/31/2021 05:22 PM       Lab Results   Component Value Date/Time    Hemoglobin A1c 7.1 (H) 03/23/2021 10:13 AM       Lab Results   Component Value Date/Time    TSH 1.990 12/18/2019 02:00 PM           Due to this being a TeleHealth  evaluation, many elements of the physical examination are unable to be assessed. The pt was seen by synchronous (real-time) audio-video technology, and/or her healthcare decision maker, is aware that this patient-initiated, Telehealth encounter is a billable service, with coverage as determined by her insurance carrier. She is aware that she may receive a bill and has provided verbal consent to proceed: Yes. The pt is being evaluated by a video visit encounter for concerns as above. A caregiver was present when appropriate. Due to this being a TeleHealth encounter (During UHOYU-85 public health emergency), evaluation of the following organ systems was limited: Vitals/Constitutional/EENT/Resp/CV/GI//MS/Neuro/Skin/Heme-Lymph-Imm. Pursuant to the emergency declaration under the [facility], [facility] waiver authority and the [employer] Act, this Virtual  Visit was conducted, with patient's (and/or legal guardian's) consent, to reduce the patient's risk of exposure to COVID-19 and provide necessary medical care. Services were provided through a video synchronous discussion virtually to substitute for in-person clinic visit. Patient and provider were located at their individual homes. We discussed the expected course, resolution and complications of the diagnosis(es) in detail. Medication risks, benefits, costs, interactions, and alternatives were discussed as indicated.   I advised him to contact the office if his condition worsens, changes or fails to improve as anticipated. He expressed understanding with the diagnosis(es) and plan.      Winter Hedrick MD

## 2021-04-06 NOTE — PROGRESS NOTES
Please let him know that his ultrasound shows gallstones as an incidental finding. No additional studies are warranted for this finding. He has normal kidneys and the appearance of his bladder is normal. His acute kidney injury is likely from dehydration.  Have him f/u with Nephrology and please forward this result to his Nephrology team.    Dr. Keri Christensen  [facility] [address], [address] [place] [address] Str.  Phone: [phone]  Fax: [phone]

## 2021-04-19 ENCOUNTER — HOSPITAL ENCOUNTER (OUTPATIENT)
Dept: LAB | Age: 36
Discharge: HOME | End: 2021-04-19

## 2021-04-19 LAB — XX-LABCORP SPECIMEN COL,LCBCF: NORMAL

## 2021-04-19 PROCEDURE — 99001 SPECIMEN HANDLING PT-LAB: CPT

## 2021-07-05 DIAGNOSIS — E11.37X9 CONTROLLED TYPE 2 DIABETES MELLITUS WITH DIABETIC MACULAR EDEMA RESOLVED AFTER TREATMENT, WITHOUT LONG-TERM CURRENT USE OF INSULIN, UNSPECIFIED LATERALITY (HCC): ICD-10-CM

## 2021-08-02 ENCOUNTER — APPOINTMENT (OUTPATIENT)
Dept: INTERNAL MEDICINE CLINIC | Age: 36
End: 2021-08-02

## 2021-08-03 LAB — HBA1C MFR BLD: 9.4 % (ref 4.8–5.6)

## 2021-08-03 NOTE — PROGRESS NOTES
I will let him know his upcoming appointment that his A1c is 9.4, up from 7.1 in March. I will adjust his diabetes medication regimen at his upcoming appointment.     Dr. Margarette Diaz  [facility], [address] [place] [address], [address] [place] [address] Str.  Phone: [phone]  Fax: [phone]

## 2021-08-06 NOTE — PROGRESS NOTES
Yomi Griggs presents today for   Chief Complaint   Patient presents with    Follow-up    Cholesterol Problem    Hypertension    Diabetes    Labs     8-2-21              Coordination of Care:  1. Have you been to the ER, urgent care clinic since your last visit? Hospitalized since your last visit? no    2. Have you seen or consulted any other health care providers outside of the [address] [place] [address] since your last visit? Include any pap smears or colon screening.  yes

## 2021-08-09 ENCOUNTER — OFFICE VISIT (OUTPATIENT)
Dept: INTERNAL MEDICINE CLINIC | Age: 36
End: 2021-08-09
Payer: MEDICAID

## 2021-08-09 VITALS
RESPIRATION RATE: 16 BRPM | HEIGHT: 66 IN | WEIGHT: 306 LBS | SYSTOLIC BLOOD PRESSURE: 170 MMHG | DIASTOLIC BLOOD PRESSURE: 97 MMHG | TEMPERATURE: 97.7 F | OXYGEN SATURATION: 98 % | BODY MASS INDEX: 49.18 KG/M2 | HEART RATE: 94 BPM

## 2021-08-09 DIAGNOSIS — I10 ESSENTIAL HYPERTENSION: ICD-10-CM

## 2021-08-09 DIAGNOSIS — N18.30 STAGE 3 CHRONIC KIDNEY DISEASE, UNSPECIFIED WHETHER STAGE 3A OR 3B CKD (HCC): ICD-10-CM

## 2021-08-09 DIAGNOSIS — E11.8 TYPE 2 DIABETES MELLITUS WITH COMPLICATION, WITHOUT LONG-TERM CURRENT USE OF INSULIN (HCC): ICD-10-CM

## 2021-08-09 DIAGNOSIS — Z01.89 ROUTINE LAB DRAW: ICD-10-CM

## 2021-08-09 DIAGNOSIS — E11.8 TYPE 2 DIABETES MELLITUS WITH COMPLICATION, WITHOUT LONG-TERM CURRENT USE OF INSULIN (HCC): Primary | ICD-10-CM

## 2021-08-09 PROCEDURE — 99214 OFFICE O/P EST MOD 30 MIN: CPT | Performed by: INTERNAL MEDICINE

## 2021-08-09 RX ORDER — DULAGLUTIDE 0.75 MG/.5ML
0.75 INJECTION, SOLUTION SUBCUTANEOUS
Qty: 12 PEN | Refills: 3 | Status: CANCELLED | OUTPATIENT
Start: 2021-08-09

## 2021-08-09 NOTE — PROGRESS NOTES
[facility]:  8/9/2021, MRN: [id number]      Yessenia Carter is a 38 y.o. male and presents to clinic for Follow-up, Cholesterol Problem, Hypertension, Diabetes, and Labs (8-2-21)    Subjective: The patient is a 58-year-old male with history of hypertension, type 2 diabetes, lumbar facet arthropathy, obesity, left transmetatarsal amputation secondary to a diabetic foot infection, and HLD. 1. Type 2 DM: Present for years. His most recent labs show his A1c is 9.4, up from 7.1 in March. Taking Jardiance, Trulicity, and Metformin. Diet: he's been drinking a lot of sweet teas. Checking BGs: yes; he checks his BG every few days - randomly. +Statin and zetia. He is scheduled with his podiatrist tomorrow. He has pain along his left foot. He is scheduled for an eye exam next month. 2.  Hypertension: Blood pressure is 179/78. He did not take his blood pressure medications today. He has been prescribed: Losartan and Lasix. Asymptomatic. 3. CKD: His last creatinine was 1.84. No urinary sx.      4. General: S/p 2 pfizer vaccine (Covid-19)      Patient Active Problem List    Diagnosis Date Noted    Erectile dysfunction 01/05/2020    Snoring 01/05/2020    Anemia 01/05/2020    Hyperlipidemia LDL goal <70 12/14/2018    Cataract of both eyes 10/08/2018    Hypertensive retinopathy of both eyes 10/08/2018    Essential hypertension 09/06/2018    Controlled type 2 diabetes mellitus, without long-term current use of insulin ([facility] 06/19/2018    Proliferative diabetic retinopathy of both eyes ([facility] 06/19/2018    Obesity, morbid, BMI 40.0-49.9 ([facility]) 05/20/2018    Lumbar facet arthropathy 05/20/2018       Current Outpatient Medications   Medication Sig Dispense Refill    losartan (COZAAR) 100 mg tablet Take 1 tablet by mouth once daily 90 Tablet 1    Jardiance 25 mg tablet Take 1 tablet by mouth once daily 30 Tab 5    atorvastatin (LIPITOR) 40 mg tablet Take 1 tablet by mouth once daily 90 Tab 2  ezetimibe (ZETIA) 10 mg tablet Take 1 Tab by mouth daily. 90 Tab 3    dulaglutide (Trulicity) 6.80 VF/2.5 mL sub-q pen 0.5 mL by SubCUTAneous route every seven (7) days. 12 Pen 3    fluticasone propionate (FLONASE) 50 mcg/actuation nasal spray 2 Sprays by Both Nostrils route daily. 1 Bottle 11    Blood-Glucose Meter monitoring kit Use to check blood sugar 2 times per day. 1 Kit 0    glucose blood VI test strips (ASCENSIA AUTODISC VI, ONE TOUCH ULTRA TEST VI) strip Use to check blood sugars twice a day. 100 Strip 3    lancets misc Use to check blood sugar twice a day 100 Each 3    furosemide (LASIX) 20 mg tablet Take 1 tablet by mouth once daily (Patient not taking: Reported on 8/9/2021) 30 Tab 5    metFORMIN (GLUCOPHAGE) 1,000 mg tablet TAKE 1 TABLET BY MOUTH TWICE DAILY WITH MEALS (Patient not taking: Reported on 8/9/2021) 180 Tab 1    traMADoL (ULTRAM) 50 mg tablet Take 50 mg by mouth every six (6) hours as needed for Pain. (Patient not taking: Reported on 2/3/7087)      folic acid 039 mcg tablet Take 1 Tab by mouth daily. (Patient not taking: Reported on 8/9/2021) 90 Tab 3       Allergies   Allergen Reactions    Amlodipine Swelling     Had swelling in his legs and feet - states was painful to walk       Past Medical History:   Diagnosis Date    HI (acute kidney injury) ([facility]     Diabetes ([facility]     Hypertension        Past Surgical History:   Procedure Laterality Date    HX AMPUTATION TOE         No family history on file. Social History     Tobacco Use    Smoking status: Never Smoker    Smokeless tobacco: Never Used   Substance Use Topics    Alcohol use: No       ROS   Review of Systems   Constitutional: Negative for chills and fever. HENT: Negative for ear pain and sore throat. Eyes: Negative for blurred vision and pain. Respiratory: Negative for cough and shortness of breath. Cardiovascular: Negative for chest pain.    Gastrointestinal: Positive for constipation (he has a BM every 2 days; taking sugar free metamucil; sx improved). Negative for abdominal pain, blood in stool and melena. Genitourinary: Negative for dysuria and hematuria. Musculoskeletal: Positive for joint pain. Negative for myalgias. Skin: Negative for rash. Neurological: Negative for headaches. Endo/Heme/Allergies: Does not bruise/bleed easily. Psychiatric/Behavioral: Negative for substance abuse. Objective     Vitals:    08/09/21 0752   BP: (!) 179/78   Pulse: 94   Resp: 16   Temp: 97.7 °F (36.5 °C)   TempSrc: Temporal   SpO2: 98%   Weight: 306 lb (138.8 kg)   Height: 5' 6\" (1.676 m)   PainSc:   3   PainLoc: Foot       Physical Exam  Vitals and nursing note reviewed. Constitutional:       Appearance: Normal appearance. HENT:      Head: Normocephalic and atraumatic. Right Ear: External ear normal.      Left Ear: External ear normal.   Eyes:      Conjunctiva/sclera: Conjunctivae normal.   Cardiovascular:      Rate and Rhythm: Normal rate and regular rhythm. Pulses: Normal pulses. Heart sounds: Normal heart sounds. Pulmonary:      Effort: Pulmonary effort is normal.      Breath sounds: Normal breath sounds. Abdominal:      General: Abdomen is flat. Bowel sounds are normal. There is no distension. Palpations: Abdomen is soft. Tenderness: There is no abdominal tenderness. Musculoskeletal:         General: No swelling (BUE) or tenderness (BUE). Cervical back: Neck supple. Lymphadenopathy:      Cervical: No cervical adenopathy. Skin:     General: Skin is warm and dry. Findings: No erythema. Neurological:      General: No focal deficit present. Mental Status: He is alert.       Gait: Gait normal.   Psychiatric:         Mood and Affect: Mood normal.         LABS   Data Review:   Lab Results   Component Value Date/Time    WBC 12.2 03/31/2021 05:22 PM    HGB 13.8 03/31/2021 05:22 PM    HCT 42.4 03/31/2021 05:22 PM    PLATELET 611 (H) 24/65/0718 05:22 PM MCV 81.2 03/31/2021 05:22 PM       Lab Results   Component Value Date/Time    Sodium 143 03/31/2021 07:04 PM    Potassium 4.2 03/31/2021 07:04 PM    Chloride 112 (H) 03/31/2021 07:04 PM    CO2 26 03/31/2021 07:04 PM    Anion gap 5 03/31/2021 07:04 PM    Glucose 121 (H) 03/31/2021 07:04 PM    BUN 38 (H) 03/31/2021 07:04 PM    Creatinine 1.84 (H) 03/31/2021 07:04 PM    BUN/Creatinine ratio 21 (H) 03/31/2021 07:04 PM    GFR est AA 51 (L) 03/31/2021 07:04 PM    GFR est non-AA 42 (L) 03/31/2021 07:04 PM    Calcium 7.8 (L) 03/31/2021 07:04 PM       Lab Results   Component Value Date/Time    Cholesterol, total 183 03/23/2021 10:13 AM    HDL Cholesterol 36 (L) 03/23/2021 10:13 AM    LDL, calculated 111 (H) 03/23/2021 10:13 AM    LDL, calculated 156 (H) 07/24/2020 09:46 AM    VLDL, calculated 36 03/23/2021 10:13 AM    VLDL, calculated 69 (H) 07/24/2020 09:46 AM    Triglyceride 206 (H) 03/23/2021 10:13 AM       Lab Results   Component Value Date/Time    Hemoglobin A1c 9.4 (H) 08/02/2021 09:16 AM       Assessment/Plan:   1. Type 2 diabetes mellitus with complication: His W1O is 9.4! +CKD. -Refilling his Trulicity but I will increase it to 1.5 mg once weekly pending f/u labs  - Checking a urine protein screen and BMP  - Referral placed to a different nephrologist per his request.   - I encouraged him to go to his Podiatry apt tomorrow.  -Continue with other medication as prescribed. -I will follow with him in a week-2 wks to reassess his BP and BGs at home.  -I encouraged him to maintain a low-carb diet. ORDERS:  - dulaglutide (Trulicity) 8.36 PF/6.9 mL sub-q pen; 0.5 mL by SubCUTAneous route every seven (7) days. Dispense: 12 Pen; Refill: 3    2. Hypertension: Blood pressure is not controlled. He did not take his medications.  -I encouraged him to take his medications as prescribed.   -I will have him follow-up with me in 1 to 2 weeks to assess his blood pressure  -Checking a urine protein screen and a BMP today.    Health Maintenance Due   Topic Date Due    Hepatitis C Screening  Never done    COVID-19 Vaccine (1) Never done    MICROALBUMIN Q1  12/18/2020     Lab review: labs are reviewed in the EHR and ordered as mentioned above. I have discussed the diagnosis with the patient and the intended plan as seen in the above orders. The patient has received an after-visit summary and questions were answered concerning future plans. I have discussed medication side effects and warnings with the patient as well. I have reviewed the plan of care with the patient, accepted their input and they are in agreement with the treatment goals. All questions were answered. The patient understands the plan of care. Handouts provided today with above information. Pt instructed if symptoms worsen to call the office or report to the ED for continued care. Greater than 50% of the visit time was spent in counseling and/or coordination of care. Voice recognition was used to generate this report, which may have resulted in some phonetic based errors in grammar and contents. Even though attempts were made to correct all the mistakes, some may have been missed, and remained in the body of the document.           Sujit Black MD

## 2021-08-09 NOTE — PATIENT INSTRUCTIONS

## 2021-08-10 LAB
ALBUMIN/CREAT UR: 2460 MG/G CREAT (ref 0–29)
BUN SERPL-MCNC: 42 MG/DL (ref 6–20)
BUN/CREAT SERPL: 20 (ref 9–20)
CALCIUM SERPL-MCNC: 9 MG/DL (ref 8.7–10.2)
CHLORIDE SERPL-SCNC: 105 MMOL/L (ref 96–106)
CO2 SERPL-SCNC: 22 MMOL/L (ref 20–29)
CREAT SERPL-MCNC: 2.11 MG/DL (ref 0.76–1.27)
CREAT UR-MCNC: 69.6 MG/DL
GLUCOSE SERPL-MCNC: 190 MG/DL (ref 65–99)
INTERPRETATION: NORMAL
MICROALBUMIN UR-MCNC: 1712.5 UG/ML
POTASSIUM SERPL-SCNC: 4.7 MMOL/L (ref 3.5–5.2)
SODIUM SERPL-SCNC: 140 MMOL/L (ref 134–144)

## 2021-08-11 NOTE — PROGRESS NOTES
Patient contacted, patient identified with two identifiers (Name & ). Patient aware of results per DR. Hobbs and verbalizes understanding.

## 2021-08-11 NOTE — PROGRESS NOTES
Please let him know that his renal function labs are about the same.     Dr. Salty Ignacio  [facility], [facility] [facility], [address] [place], [place] [address] [address]  Phone: [phone]  Fax: [phone]

## 2021-08-19 ENCOUNTER — TELEPHONE (OUTPATIENT)
Dept: INTERNAL MEDICINE CLINIC | Age: 36
End: 2021-08-19

## 2021-08-19 NOTE — TELEPHONE ENCOUNTER
Patient reached, he denies having any chest pain, sob, or dizziness. Patient advised to go to ER if he experiences any of these symptoms. Patient states that he is taking the losartan and lasix as instructed. Patient unable to check BP at home, he does not have a BP machine. Patient scheduled for OV on 8-25-21 for a two follow up of his BP. Patient aware that the provider is out of the office until tomorrow. Do you want to see the patient sooner? Patient called back and stated that he stopped at a [employer] to check his BP, the readings were 142/79 and 131/84. Patient advised to keep his appointment next week for BP f/u and if the provider wants to see him sooner we will contact him.

## 2021-08-19 NOTE — TELEPHONE ENCOUNTER
Dahlia, physical therapist with [facility] called and said patient's BP is too high (for the second time) to safely exercise him---reading was 180/114. He is not symptomatic but she is going to send him home with stroke precautions to be aware of. She is going to let him know to expect a call from the nurse regarding if his appt scheduled here for next Tues (per Pt) needs to be moved up or what. Edgar Ayala can be reached at [phone] if any questions.

## 2021-08-20 RX ORDER — BLOOD-GLUCOSE METER
EACH MISCELLANEOUS
Qty: 50 EACH | Refills: 5 | Status: SHIPPED | OUTPATIENT
Start: 2021-08-20 | End: 2022-06-30

## 2021-08-20 RX ORDER — IBUPROFEN 200 MG
CAPSULE ORAL
Qty: 100 STRIP | Refills: 5 | Status: SHIPPED | OUTPATIENT
Start: 2021-08-20

## 2021-08-30 NOTE — PROGRESS NOTES
Adriana Rodríguez presents today for   Chief Complaint   Patient presents with    Follow-up            Coordination of Care:  1. Have you been to the ER, urgent care clinic since your last visit? Hospitalized since your last visit? NO    2. Have you seen or consulted any other health care providers outside of the [address] [place] [address] since your last visit? Include any pap smears or colon screening.  YES

## 2021-09-03 ENCOUNTER — OFFICE VISIT (OUTPATIENT)
Dept: INTERNAL MEDICINE CLINIC | Age: 36
End: 2021-09-03
Payer: MEDICAID

## 2021-09-03 VITALS
WEIGHT: 305 LBS | OXYGEN SATURATION: 98 % | RESPIRATION RATE: 16 BRPM | HEART RATE: 105 BPM | BODY MASS INDEX: 49.02 KG/M2 | HEIGHT: 66 IN | TEMPERATURE: 98.2 F | DIASTOLIC BLOOD PRESSURE: 101 MMHG | SYSTOLIC BLOOD PRESSURE: 192 MMHG

## 2021-09-03 DIAGNOSIS — E11.37X9 CONTROLLED TYPE 2 DIABETES MELLITUS WITH DIABETIC MACULAR EDEMA RESOLVED AFTER TREATMENT, WITHOUT LONG-TERM CURRENT USE OF INSULIN, UNSPECIFIED LATERALITY (HCC): ICD-10-CM

## 2021-09-03 DIAGNOSIS — N18.32 STAGE 3B CHRONIC KIDNEY DISEASE (HCC): ICD-10-CM

## 2021-09-03 DIAGNOSIS — I10 ESSENTIAL HYPERTENSION: Primary | ICD-10-CM

## 2021-09-03 DIAGNOSIS — I10 ESSENTIAL HYPERTENSION: ICD-10-CM

## 2021-09-03 DIAGNOSIS — E66.01 OBESITY, MORBID, BMI 40.0-49.9 (HCC): ICD-10-CM

## 2021-09-03 PROCEDURE — 99214 OFFICE O/P EST MOD 30 MIN: CPT | Performed by: INTERNAL MEDICINE

## 2021-09-03 RX ORDER — CARVEDILOL 12.5 MG/1
12.5 TABLET ORAL 2 TIMES DAILY WITH MEALS
Qty: 60 TABLET | Refills: 6 | Status: SHIPPED | OUTPATIENT
Start: 2021-09-03 | End: 2021-09-10

## 2021-09-03 NOTE — PATIENT INSTRUCTIONS
Home Blood Pressure Test: About This Test  What is it? A home blood pressure test allows you to keep track of your blood pressure at home. Blood pressure is a measure of the force of blood against the walls of your arteries. Blood pressure readings include two numbers, such as 130/80 (say \"130 over 80\"). The first number is the systolic pressure. The second number is the diastolic pressure. Why is this test done? You may do this test at home to:  · Find out if you have high blood pressure. · Track your blood pressure if you have high blood pressure. · Track how well medicine is working to reduce high blood pressure. · Check how lifestyle changes, such as weight loss and exercise, are affecting blood pressure. How do you prepare for the test?  For at least 30 minutes before you take your blood pressure, don't exercise, drink caffeine, or smoke. Empty your bladder before the test. Sit quietly with your back straight and both feet on the floor for at least 5 minutes. This helps you take your blood pressure while you feel comfortable and relaxed. How is the test done? · If your doctor recommends it, take your blood pressure twice a day. Take it in the morning and evening. · Sit with your arm slightly bent and resting on a table so that your upper arm is at the same level as your heart. · Use the same arm each time you take your blood pressure. · Place the blood pressure cuff on the bare skin of your upper arm. You may have to roll up your sleeve, remove your arm from the sleeve, or take your shirt off. · Wrap the blood pressure cuff around your upper arm so that the lower edge of the cuff is about 1 inch above the bend of your elbow. · Do not move, talk, or text while you take your blood pressure. Follow the instructions that came with your blood pressure monitor. They might be different from the following. · Press the on/off button on the automatic monitor.  Then you may need to wait until the screen says the monitor is ready. · Press the start button. The cuff will inflate and deflate by itself. · Your blood pressure numbers will appear on the screen. · Wait one minute and take your blood pressure again. · If your monitor does not automatically save your numbers, write them in your log book, along with the date and time. Follow-up care is a key part of your treatment and safety. Be sure to make and go to all appointments, and call your doctor if you are having problems. It's also a good idea to keep a list of the medicines you take. Where can you learn more? Go to http://www.chong.com/  Enter C427 in the search box to learn more about \"Home Blood Pressure Test: About This Test.\"  Current as of: August 31, 2020               Content Version: 12.8  © 7392-9866 [employer]. Care instructions adapted under license by [email or web address] (which disclaims liability or warranty for this information). If you have questions about a medical condition or this instruction, always ask your healthcare professional. [place] [address] any warranty or liability for your use of this information. Learning About Meal Planning for Diabetes  Why plan your meals? Meal planning can be a key part of managing diabetes. Planning meals and snacks with the right balance of carbohydrate, protein, and fat can help you keep your blood sugar at the target level you set with your doctor. You don't have to eat special foods. You can eat what your family eats, including sweets once in a while. But you do have to pay attention to how often you eat and how much you eat of certain foods. You may want to work with a dietitian or a certified diabetes educator. He or she can give you tips and meal ideas and can answer your questions about meal planning. This health professional can also help you reach a healthy weight if that is one of your goals.   What plan is right for you? Your dietitian or diabetes educator may suggest that you start with the plate format or carbohydrate counting. The plate format  The plate format is a simple way to help you manage how you eat. You plan meals by learning how much space each food should take on a plate. Using the plate format helps you spread carbohydrate throughout the day. It can make it easier to keep your blood sugar level within your target range. It also helps you see if you're eating healthy portion sizes. To use the plate format, you put non-starchy vegetables on half your plate. Add meat or meat substitutes on one-quarter of the plate. Put a grain or starchy vegetable (such as brown rice or a potato) on the final quarter of the plate. You can add a small piece of fruit and some low-fat or fat-free milk or yogurt, depending on your carbohydrate goal for each meal.  Here are some tips for using the plate format:  · Make sure that you are not using an oversized plate. A 9-inch plate is best. Many restaurants use larger plates. · Get used to using the plate format at home. Then you can use it when you eat out. · Write down your questions about using the plate format. Talk to your doctor, a dietitian, or a diabetes educator about your concerns. Carbohydrate counting  With carbohydrate counting, you plan meals based on the amount of carbohydrate in each food. Carbohydrate raises blood sugar higher and more quickly than any other nutrient. It is found in desserts, breads and cereals, and fruit. It's also found in starchy vegetables such as potatoes and corn, grains such as rice and pasta, and milk and yogurt. Spreading carbohydrate throughout the day helps keep your blood sugar levels within your target range. Your daily amount depends on several things, including your weight, how active you are, which diabetes medicines you take, and what your goals are for your blood sugar levels.  A registered dietitian or diabetes educator can help you plan how much carbohydrate to include in each meal and snack. A guideline for your daily amount of carbohydrate is:  · 45 to 60 grams at each meal. That's about the same as 3 to 4 carbohydrate servings. · 15 to 20 grams at each snack. That's about the same as 1 carbohydrate serving. The Nutrition Facts label on packaged foods tells you how much carbohydrate is in a serving of the food. First, look at the serving size on the food label. Is that the amount you eat in a serving? All of the nutrition information on a food label is based on that serving size. So if you eat more or less than that, you'll need to adjust the other numbers. Total carbohydrate is the next thing you need to look for on the label. If you count carbohydrate servings, one serving of carbohydrate is 15 grams. For foods that don't come with labels, such as fresh fruits and vegetables, you'll need a guide that lists carbohydrate in these foods. Ask your doctor, dietitian, or diabetes educator about books or other nutrition guides you can use. If you take insulin, you need to know how many grams of carbohydrate are in a meal. This lets you know how much rapid-acting insulin to take before you eat. If you use an insulin pump, you get a constant rate of insulin during the day. So the pump must be programmed at meals to give you extra insulin to cover the rise in blood sugar after meals. When you know how much carbohydrate you will eat, you can take the right amount of insulin. Or, if you always use the same amount of insulin, you need to make sure that you eat the same amount of carbohydrate at meals. If you need more help to understand carbohydrate counting and food labels, ask your doctor, dietitian, or diabetes educator. How can you plan healthy meals? Here are some tips to get started:  · Plan your meals a week at a time. Don't forget to include snacks too. · Use cookbooks or online recipes to plan several main meals.  Plan some quick meals for busy nights. You also can double some recipes that freeze well. Then you can save half for other busy nights when you don't have time to cook. · Make sure you have the ingredients you need for your recipes. If you're running low on basic items, put these items on your shopping list too. · List foods that you use to make breakfasts, lunches, and snacks. List plenty of fruits and vegetables. · Post this list on the refrigerator. Add to it as you think of more things you need. · Take the list to the store to do your weekly shopping. Follow-up care is a key part of your treatment and safety. Be sure to make and go to all appointments, and call your doctor if you are having problems. It's also a good idea to know your test results and keep a list of the medicines you take. Where can you learn more? Go to http://lisaEarLenstabitha.info/  Enter K822 in the search box to learn more about \"Learning About Meal Planning for Diabetes. \"  Current as of: August 31, 2020               Content Version: 12.8  © 9877-0364 [employer]. Care instructions adapted under license by [employer] (which disclaims liability or warranty for this information). If you have questions about a medical condition or this instruction, always ask your healthcare professional. [address] any warranty or liability for your use of this information. Diabetic Renal Diet: Care Instructions  Your Care Instructions     You may already be spreading carbohydrate throughout your daily meals. When you also have kidney disease, you need to avoid foods that make your kidneys worse. Keep your blood sugar and blood pressure as near normal as you can to reduce your chance of kidney failure. Your doctor and dietitian will help you make an eating plan. It will be based on your body weight, size, and medical condition. You may need to limit salt, fluids, and protein.  You also may need to limit minerals such as potassium and phosphorus. It takes planning, but there are plenty of tasty, healthy foods you can eat. Always talk with your doctor or dietitian before you make changes in your diet. Follow-up care is a key part of your treatment and safety. Be sure to make and go to all appointments, and call your doctor if you are having problems. It's also a good idea to know your test results and keep a list of the medicines you take. How can you care for yourself at home? · Work with your doctor or dietitian to create a food plan that guides your daily food choices. · Do not skip meals or go for many hours without eating. · You can use margarine, mayonnaise, and oil to add calories to your diet for energy. The healthiest oils are olive, canola, and safflower oils. · Talk to your dietitian about eating sweets, including honey and sugar. · Be safe with medicines. Take your medicines exactly as prescribed. Call your doctor if you think you are having a problem with your medicine. You will get more details on the specific medicines your doctor prescribes. · Do not take any other medicine without talking to your doctor first. This includes over-the-counter medicines, vitamins, and herbal products. · Limit alcohol to no more than 1 drink per day. Count it as part of your fluid allowance. To get the right amount of protein  · Ask your doctor or dietitian how much protein you can have each day. You need some protein to stay healthy. · Include all sources of protein in your daily protein count. Besides meat, poultry, and fish, protein is found in milk and milk products, beans, peas, lentils, nuts, seeds, tofu, and eggs. Check for protein on the nutrition facts label found on packages of food such as bread and cereal.  To limit salt  · Do not add salt to your food. And look for \"reduced salt\" or \"low sodium\" on labels.   · Do not use a salt substitute or lite salt unless your doctor says it is okay. (These products are high in potassium.)  · Avoid or use very small amounts of condiments and marinades. These include soy sauce, fish sauce, and barbecue sauce. They are high in sodium. · Avoid salted pretzels, chips, and other salted snacks. · Check food labels to become more aware of the sodium content of foods. Foods that are high in sodium include soups; many canned foods; cured, smoked, or dried meats; and many packaged foods. To control carbohydrate  · Ask your dietitian how much carbohydrate you can have. Carbohydrate foods include:  ? Whole-grain and refined breads and cereals, and some vegetables such as peas and beans. ? Fruits, milk, and milk products (except cheese). ? Candy, table sugar, and regular carbonated drinks. To limit fluids  · Know what your fluid allowance is. Fill a pitcher with that amount of water every day. If you drink another fluid (such as coffee) that day, pour an equal amount out of the pitcher. · Foods that are liquid at room temperature count as fluids. These include ice, gelatin, ice pops, and ice cream.  To limit potassium  · Fruits that are low in potassium include blueberries and raspberries. · Vegetables that are low in potassium include cucumber and radishes. To limit phosphorus  · Follow your doctor's or dietitian's plan for your limit on milk and milk products in your diet. · Avoid nuts, peanut butter, seeds, lentils, beans, organ meats, and sardines. · Avoid cola drinks. · Avoid bran breads or bran cereals. They are high in phosphorus. Where can you learn more? Go to http://www.gray.com/  Enter [id number] in the search box to learn more about \"Diabetic Renal Diet: Care Instructions. \"  Current as of: August 31, 2020               Content Version: 12.8  © 8164-1559 [employer]. Care instructions adapted under license by [employer] (which disclaims liability or warranty for this information).  If you have questions about a medical condition or this instruction, always ask your healthcare professional. [address] any warranty or liability for your use of this information. Low Sodium Diet (2,000 Milligram): Care Instructions  Overview     Limiting sodium can be an important part of managing some health problems. The most common source of sodium is salt. People get most of the salt in their diet from canned, prepared, and packaged foods. Fast food and restaurant meals also are very high in sodium. Your doctor will probably limit your sodium to less than 2,000 milligrams (mg) a day. This limit counts all the sodium in prepared and packaged foods and any salt you add to your food. Follow-up care is a key part of your treatment and safety. Be sure to make and go to all appointments, and call your doctor if you are having problems. It's also a good idea to know your test results and keep a list of the medicines you take. How can you care for yourself at home? Read food labels  · Read labels on cans and food packages. The labels tell you how much sodium is in each serving. Make sure that you look at the serving size. If you eat more than the serving size, you have eaten more sodium. · Food labels also tell you the Percent Daily Value for sodium. Choose products with low Percent Daily Values for sodium. · Be aware that sodium can come in forms other than salt, including monosodium glutamate (MSG), sodium citrate, and sodium bicarbonate (baking soda). MSG is often added to Asian food. When you eat out, you can sometimes ask for food without MSG or added salt. Buy low-sodium foods  · Buy foods that are labeled \"unsalted\" (no salt added), \"sodium-free\" (less than 5 mg of sodium per serving), or \"low-sodium\" (140 mg or less of sodium per serving). Foods labeled \"reduced-sodium\" and \"light sodium\" may still have too much sodium.  Be sure to read the label to see how much sodium you are getting. · Buy fresh vegetables, or frozen vegetables without added sauces. Buy low-sodium versions of canned vegetables, soups, and other canned goods. Prepare low-sodium meals  · Cut back on the amount of salt you use in cooking. This will help you adjust to the taste. Do not add salt after cooking. One teaspoon of salt has about 2,300 mg of sodium. · Take the salt shaker off the table. · Flavor your food with garlic, lemon juice, onion, vinegar, herbs, and spices. Do not use soy sauce, lite soy sauce, steak sauce, onion salt, garlic salt, celery salt, or ketchup on your food. · Use low-sodium salad dressings, sauces, and ketchup. Or make your own salad dressings and sauces without adding salt. · Use less salt (or none) when recipes call for it. You can often use half the salt a recipe calls for without losing flavor. Other foods such as rice, pasta, and grains do not need added salt. · Rinse canned vegetables, and cook them in fresh water. This removes some--but not all--of the salt. · Avoid water that is naturally high in sodium or that has been treated with water softeners, which add sodium. If you buy bottled water, read the label and choose a sodium-free brand. Avoid high-sodium foods  · Avoid eating:  ? Smoked, cured, salted, and canned meat, fish, and poultry. ? Ham, reyes, hot dogs, and luncheon meats. ? Regular, hard, and processed cheese and regular peanut butter. ? Crackers with salted tops, and other salted snack foods such as pretzels, chips, and salted popcorn. ? Frozen prepared meals, unless labeled low-sodium. ? Canned and dried soups, broths, and bouillon, unless labeled sodium-free or low-sodium. ? Canned vegetables, unless labeled sodium-free or low-sodium. ? Western Stephanie fries, pizza, tacos, and other fast foods. ? Pickles, olives, ketchup, and other condiments, especially soy sauce, unless labeled sodium-free or low-sodium. Where can you learn more?   Go to http://www.gray.com/  Enter S849 in the search box to learn more about \"Low Sodium Diet (2,000 Milligram): Care Instructions. \"  Current as of: December 17, 2020               Content Version: 12.8  © 7328-2505 [employer]. Care instructions adapted under license by [employer] (which disclaims liability or warranty for this information). If you have questions about a medical condition or this instruction, always ask your healthcare professional. [place] [address] any warranty or liability for your use of this information.

## 2021-09-03 NOTE — PROGRESS NOTES
INTERNISTS [facility]:  9/3/2021, MRN: [id number]      Adriana Rodríguez is a 39 y.o. male and presents to clinic for Follow-up    Subjective: The patient is a 25-year-old male with history of hypertension, type 2 diabetes, lumbar facet arthropathy, obesity, left transmetatarsal amputation secondary to a diabetic foot infection, and HLD.     1. Type 2 Diabetes and CKD: At his last appointment, I referred him to a different nephrologist per his preference. Today he reports: he is eating better. No urinary symptoms. He stopped taking metformin. He is scheduled with his new nephrologist next month. His last A1c was 9.4 this summer. He is on [place] and Trulicity. At his last appointment, I increase his Trulicity. Blood sugar checks: yes; he checks his BG before breakfast and sometimes 3-4 hours after eating. +Statin/zetia for CVD risk reduction. His last creatinine was in the 2 range. His estimated GFR was 45.    2. HTN: His blood pressure is very high today at 192/101. Asymptomatic. He does take his blood pressure medications 30 minutes before his appointment. He takes losartan and Lasix. No adverse side effects. He is compliant with his rx. He is scheduled with Sleep Medicine for FINA evaluation.          Patient Active Problem List    Diagnosis Date Noted    Erectile dysfunction 01/05/2020    Snoring 01/05/2020    Anemia 01/05/2020    Hyperlipidemia LDL goal <70 12/14/2018    Cataract of both eyes 10/08/2018    Hypertensive retinopathy of both eyes 10/08/2018    Essential hypertension 09/06/2018    Controlled type 2 diabetes mellitus, without long-term current use of insulin ([address] 06/19/2018    Proliferative diabetic retinopathy of both eyes ([address] 06/19/2018    Obesity, morbid, BMI 40.0-49.9 ([address] 05/20/2018    Lumbar facet arthropathy 05/20/2018       Current Outpatient Medications   Medication Sig Dispense Refill    fluticasone propionate (FLONASE) 50 mcg/actuation nasal spray Use 2 spray(s) in each nostril once daily 16 g 5    Blood-Glucose Meter (True Metrix Glucose Meter) misc Check glucose twice daily. DX U18.19C5 50 Each 5    glucose blood VI test strips (blood glucose test) strip Check glucose twice daily. DX E11.37X9 247 Strip 5    Trulicity 5.35 EO/5.5 mL sub-q pen INJECT 1 PEN SUBCUTANEOUSLY ONCE A WEEK 12 mL 5    lancets misc USE TO CHECK BLOOD SUGAR TWICE DAILY 100 Each 5    losartan (COZAAR) 100 mg tablet Take 1 tablet by mouth once daily 90 Tablet 1    Jardiance 25 mg tablet Take 1 tablet by mouth once daily 30 Tab 5    furosemide (LASIX) 20 mg tablet Take 1 tablet by mouth once daily 30 Tab 5    atorvastatin (LIPITOR) 40 mg tablet Take 1 tablet by mouth once daily 90 Tab 2    ezetimibe (ZETIA) 10 mg tablet Take 1 Tab by mouth daily. 90 Tab 3    Blood-Glucose Meter monitoring kit Use to check blood sugar 2 times per day. 1 Kit 0    metFORMIN (GLUCOPHAGE) 1,000 mg tablet TAKE 1 TABLET BY MOUTH TWICE DAILY WITH MEALS (Patient not taking: Reported on 8/9/2021) 180 Tab 1       Allergies   Allergen Reactions    Amlodipine Swelling     Had swelling in his legs and feet - states was painful to walk       Past Medical History:   Diagnosis Date    [facility]     Diabetes ([facility]     Hypertension        Past Surgical History:   Procedure Laterality Date    HX AMPUTATION TOE         No family history on file. Social History     Tobacco Use    Smoking status: Never Smoker    Smokeless tobacco: Never Used   Substance Use Topics    Alcohol use: No       ROS   Review of Systems   Constitutional: Negative for chills and fever. HENT: Negative for ear pain and sore throat. Eyes: Negative for blurred vision and pain. Respiratory: Negative for cough and shortness of breath. Cardiovascular: Negative for chest pain. Gastrointestinal: Negative for abdominal pain, blood in stool and melena. Genitourinary: Negative for dysuria and hematuria. Musculoskeletal: Positive for joint pain (Chronic left foot pain). Negative for myalgias. Skin: Negative for rash. Neurological: Negative for tingling, focal weakness and headaches. Endo/Heme/Allergies: Does not bruise/bleed easily. Psychiatric/Behavioral: Negative for substance abuse. Objective     Vitals:    09/03/21 0801 09/03/21 0809   BP: (!) 202/97 (!) 192/101   Pulse: (!) 103 (!) 105   Resp: 16    Temp: 98.2 °F (36.8 °C)    TempSrc: Temporal    SpO2: 98%    Weight: 305 lb (138.3 kg)    Height: 5' 6\" (1.676 m)    PainSc:   2    PainLoc: Foot        Physical Exam  Vitals and nursing note reviewed. Constitutional:       Appearance: Normal appearance. HENT:      Head: Normocephalic and atraumatic. Right Ear: External ear normal.      Left Ear: External ear normal.      Mouth/Throat:      Pharynx: No oropharyngeal exudate or posterior oropharyngeal erythema. Eyes:      Conjunctiva/sclera: Conjunctivae normal.   Cardiovascular:      Rate and Rhythm: Normal rate and regular rhythm. Pulses: Normal pulses. Heart sounds: Normal heart sounds. Pulmonary:      Effort: Pulmonary effort is normal.      Breath sounds: Normal breath sounds. Abdominal:      General: Abdomen is flat. Bowel sounds are normal. There is no distension. Palpations: Abdomen is soft. Tenderness: There is no abdominal tenderness. There is no guarding. Musculoskeletal:         General: No swelling (BUE) or tenderness (BUE). Cervical back: Neck supple. Lymphadenopathy:      Cervical: No cervical adenopathy. Skin:     General: Skin is warm and dry. Findings: No erythema. Neurological:      General: No focal deficit present. Mental Status: He is alert.       Gait: Gait normal.   Psychiatric:         Mood and Affect: Mood normal.         LABS   Data Review:   Lab Results   Component Value Date/Time    WBC 12.2 03/31/2021 05:22 PM    HGB 13.8 03/31/2021 05:22 PM    HCT 42.4 03/31/2021 05:22 PM    PLATELET 987 (H) 96/20/9345 05:22 PM    MCV 81.2 03/31/2021 05:22 PM       Lab Results   Component Value Date/Time    Sodium 140 08/09/2021 12:00 AM    Potassium 4.7 08/09/2021 12:00 AM    Chloride 105 08/09/2021 12:00 AM    CO2 22 08/09/2021 12:00 AM    Anion gap 5 03/31/2021 07:04 PM    Glucose 190 (H) 08/09/2021 12:00 AM    BUN 42 (H) 08/09/2021 12:00 AM    Creatinine 2.11 (H) 08/09/2021 12:00 AM    BUN/Creatinine ratio 20 08/09/2021 12:00 AM    GFR est AA 45 (L) 08/09/2021 12:00 AM    GFR est non-AA 39 (L) 08/09/2021 12:00 AM    Calcium 9.0 08/09/2021 12:00 AM       Lab Results   Component Value Date/Time    Cholesterol, total 183 03/23/2021 10:13 AM    HDL Cholesterol 36 (L) 03/23/2021 10:13 AM    LDL, calculated 111 (H) 03/23/2021 10:13 AM    LDL, calculated 156 (H) 07/24/2020 09:46 AM    VLDL, calculated 36 03/23/2021 10:13 AM    VLDL, calculated 69 (H) 07/24/2020 09:46 AM    Triglyceride 206 (H) 03/23/2021 10:13 AM       Lab Results   Component Value Date/Time    Hemoglobin A1c 9.4 (H) 08/02/2021 09:16 AM       Assessment/Plan:   1. Type 2 DM: His most recent A1c is 9.4  - Low carb renal diet recommended. We discussed the importance of weight reduction. I am placing a referral to Bariatric Surgery  - C/w rx as prescribed but I will remove Metformin from his medication list.  We need to monitor his renal function very closely.  -Checking labs today to assess his renal function.  -I encouraged him to follow-up with his new nephrologist next month.  -I encouraged him to check his blood sugar 1 to 2 hours after eating in addition to before breakfast.    2.  Hypertension: Blood pressure is very high. Asymptomatic.  -Checking labs including TFTs. -Return to clinic in a week to reassess his blood pressure.   He will continue taking his medication as prescribed I am adding carvedilol.  -I instructed him to notify me if he develops any adverse side effects.  -I instructed him to reduce his salt intake.  -I encouraged him to follow-up with Sleep Medicine to rule out FINA. Health Maintenance Due   Topic Date Due    Hepatitis C Screening  Never done    Flu Vaccine (1) 09/01/2021         Lab review: labs are reviewed in the EHR and ordered as mentioned above. I have discussed the diagnosis with the patient and the intended plan as seen in the above orders. The patient has received an after-visit summary and questions were answered concerning future plans. I have discussed medication side effects and warnings with the patient as well. I have reviewed the plan of care with the patient, accepted their input and they are in agreement with the treatment goals. All questions were answered. The patient understands the plan of care. Handouts provided today with above information. Pt instructed if symptoms worsen to call the office or report to the ED for continued care. Greater than 50% of the visit time was spent in counseling and/or coordination of care. Voice recognition was used to generate this report, which may have resulted in some phonetic based errors in grammar and contents. Even though attempts were made to correct all the mistakes, some may have been missed, and remained in the body of the document.           Gerry Walsh MD

## 2021-09-04 LAB
BASOPHILS # BLD AUTO: 0 X10E3/UL (ref 0–0.2)
BASOPHILS NFR BLD AUTO: 0 %
CRP SERPL-MCNC: 6 MG/L (ref 0–10)
EOSINOPHIL # BLD AUTO: 0.5 X10E3/UL (ref 0–0.4)
EOSINOPHIL NFR BLD AUTO: 5 %
ERYTHROCYTE [DISTWIDTH] IN BLOOD BY AUTOMATED COUNT: 13.8 % (ref 11.6–15.4)
FERRITIN SERPL-MCNC: 198 NG/ML (ref 30–400)
HCT VFR BLD AUTO: 37.1 % (ref 37.5–51)
HGB BLD-MCNC: 12.3 G/DL (ref 13–17.7)
IMM GRANULOCYTES # BLD AUTO: 0.1 X10E3/UL (ref 0–0.1)
IMM GRANULOCYTES NFR BLD AUTO: 1 %
IRON SATN MFR SERPL: 25 % (ref 15–55)
IRON SERPL-MCNC: 64 UG/DL (ref 38–169)
LYMPHOCYTES # BLD AUTO: 3.1 X10E3/UL (ref 0.7–3.1)
LYMPHOCYTES NFR BLD AUTO: 31 %
MCH RBC QN AUTO: 27 PG (ref 26.6–33)
MCHC RBC AUTO-ENTMCNC: 33.2 G/DL (ref 31.5–35.7)
MCV RBC AUTO: 82 FL (ref 79–97)
MONOCYTES # BLD AUTO: 1 X10E3/UL (ref 0.1–0.9)
MONOCYTES NFR BLD AUTO: 10 %
NEUTROPHILS # BLD AUTO: 5.3 X10E3/UL (ref 1.4–7)
NEUTROPHILS NFR BLD AUTO: 53 %
PLATELET # BLD AUTO: 332 X10E3/UL (ref 150–450)
RBC # BLD AUTO: 4.55 X10E6/UL (ref 4.14–5.8)
T4 FREE SERPL-MCNC: 1.3 NG/DL (ref 0.82–1.77)
TIBC SERPL-MCNC: 259 UG/DL (ref 250–450)
TSH SERPL DL<=0.005 MIU/L-ACNC: 1.93 UIU/ML (ref 0.45–4.5)
UIBC SERPL-MCNC: 195 UG/DL (ref 111–343)
WBC # BLD AUTO: 10 X10E3/UL (ref 3.4–10.8)

## 2021-09-09 NOTE — PROGRESS NOTES
Rambo Yu presents today for   Chief Complaint   Patient presents with   [address]     printed for review              Coordination of Care:  1. Have you been to the ER, urgent care clinic since your last visit? Hospitalized since your last visit? no    2. Have you seen or consulted any other health care providers outside of the [address] [place] [address] since your last visit? Include any pap smears or colon screening.  yes

## 2021-09-10 ENCOUNTER — OFFICE VISIT (OUTPATIENT)
Dept: INTERNAL MEDICINE CLINIC | Age: 36
End: 2021-09-10
Payer: MEDICAID

## 2021-09-10 VITALS
WEIGHT: 303 LBS | DIASTOLIC BLOOD PRESSURE: 95 MMHG | BODY MASS INDEX: 48.7 KG/M2 | HEIGHT: 66 IN | SYSTOLIC BLOOD PRESSURE: 171 MMHG | TEMPERATURE: 98.2 F | HEART RATE: 77 BPM | OXYGEN SATURATION: 98 % | RESPIRATION RATE: 16 BRPM

## 2021-09-10 DIAGNOSIS — I10 ESSENTIAL HYPERTENSION: ICD-10-CM

## 2021-09-10 DIAGNOSIS — E11.37X9 CONTROLLED TYPE 2 DIABETES MELLITUS WITH DIABETIC MACULAR EDEMA RESOLVED AFTER TREATMENT, WITHOUT LONG-TERM CURRENT USE OF INSULIN, UNSPECIFIED LATERALITY (HCC): Primary | ICD-10-CM

## 2021-09-10 DIAGNOSIS — E11.37X9 CONTROLLED TYPE 2 DIABETES MELLITUS WITH DIABETIC MACULAR EDEMA RESOLVED AFTER TREATMENT, WITHOUT LONG-TERM CURRENT USE OF INSULIN, UNSPECIFIED LATERALITY (HCC): ICD-10-CM

## 2021-09-10 DIAGNOSIS — N18.32 STAGE 3B CHRONIC KIDNEY DISEASE (HCC): ICD-10-CM

## 2021-09-10 LAB
BUN SERPL-MCNC: 50 MG/DL (ref 6–20)
BUN/CREAT SERPL: 21 (ref 9–20)
CALCIUM SERPL-MCNC: 9.2 MG/DL (ref 8.7–10.2)
CHLORIDE SERPL-SCNC: 102 MMOL/L (ref 96–106)
CO2 SERPL-SCNC: 16 MMOL/L (ref 20–29)
CREAT SERPL-MCNC: 2.35 MG/DL (ref 0.76–1.27)
GLUCOSE SERPL-MCNC: 151 MG/DL (ref 65–99)
INTERPRETATION: NORMAL
POTASSIUM SERPL-SCNC: 4.5 MMOL/L (ref 3.5–5.2)
SODIUM SERPL-SCNC: 138 MMOL/L (ref 134–144)
SPECIMEN STATUS REPORT, ROLRST: NORMAL

## 2021-09-10 PROCEDURE — 99214 OFFICE O/P EST MOD 30 MIN: CPT | Performed by: INTERNAL MEDICINE

## 2021-09-10 RX ORDER — HYDRALAZINE HYDROCHLORIDE 25 MG/1
25 TABLET, FILM COATED ORAL 3 TIMES DAILY
Qty: 90 TABLET | Refills: 5 | Status: SHIPPED | OUTPATIENT
Start: 2021-09-10 | End: 2021-11-09 | Stop reason: SDUPTHER

## 2021-09-10 RX ORDER — CARVEDILOL 25 MG/1
25 TABLET ORAL 2 TIMES DAILY WITH MEALS
Qty: 60 TABLET | Refills: 5 | Status: SHIPPED | OUTPATIENT
Start: 2021-09-10 | End: 2022-03-25

## 2021-09-10 RX ORDER — LANCETS 33 GAUGE
EACH MISCELLANEOUS
COMMUNITY
Start: 2021-09-02 | End: 2022-06-30

## 2021-09-10 NOTE — PATIENT INSTRUCTIONS
Diabetic Renal Diet: Care Instructions  Your Care Instructions     You may already be spreading carbohydrate throughout your daily meals. When you also have kidney disease, you need to avoid foods that make your kidneys worse. Keep your blood sugar and blood pressure as near normal as you can to reduce your chance of kidney failure. Your doctor and dietitian will help you make an eating plan. It will be based on your body weight, size, and medical condition. You may need to limit salt, fluids, and protein. You also may need to limit minerals such as potassium and phosphorus. It takes planning, but there are plenty of tasty, healthy foods you can eat. Always talk with your doctor or dietitian before you make changes in your diet. Follow-up care is a key part of your treatment and safety. Be sure to make and go to all appointments, and call your doctor if you are having problems. It's also a good idea to know your test results and keep a list of the medicines you take. How can you care for yourself at home? · Work with your doctor or dietitian to create a food plan that guides your daily food choices. · Do not skip meals or go for many hours without eating. · You can use margarine, mayonnaise, and oil to add calories to your diet for energy. The healthiest oils are olive, canola, and safflower oils. · Talk to your dietitian about eating sweets, including honey and sugar. · Be safe with medicines. Take your medicines exactly as prescribed. Call your doctor if you think you are having a problem with your medicine. You will get more details on the specific medicines your doctor prescribes. · Do not take any other medicine without talking to your doctor first. This includes over-the-counter medicines, vitamins, and herbal products. · Limit alcohol to no more than 1 drink per day. Count it as part of your fluid allowance.   To get the right amount of protein  · Ask your doctor or dietitian how much protein you can have each day. You need some protein to stay healthy. · Include all sources of protein in your daily protein count. Besides meat, poultry, and fish, protein is found in milk and milk products, beans, peas, lentils, nuts, seeds, tofu, and eggs. Check for protein on the nutrition facts label found on packages of food such as bread and cereal.  To limit salt  · Do not add salt to your food. And look for \"reduced salt\" or \"low sodium\" on labels. · Do not use a salt substitute or lite salt unless your doctor says it is okay. (These products are high in potassium.)  · Avoid or use very small amounts of condiments and marinades. These include soy sauce, fish sauce, and barbecue sauce. They are high in sodium. · Avoid salted pretzels, chips, and other salted snacks. · Check food labels to become more aware of the sodium content of foods. Foods that are high in sodium include soups; many canned foods; cured, smoked, or dried meats; and many packaged foods. To control carbohydrate  · Ask your dietitian how much carbohydrate you can have. Carbohydrate foods include:  ? Whole-grain and refined breads and cereals, and some vegetables such as peas and beans. ? Fruits, milk, and milk products (except cheese). ? Candy, table sugar, and regular carbonated drinks. To limit fluids  · Know what your fluid allowance is. Fill a pitcher with that amount of water every day. If you drink another fluid (such as coffee) that day, pour an equal amount out of the pitcher. · Foods that are liquid at room temperature count as fluids. These include ice, gelatin, ice pops, and ice cream.  To limit potassium  · Fruits that are low in potassium include blueberries and raspberries. · Vegetables that are low in potassium include cucumber and radishes. To limit phosphorus  · Follow your doctor's or dietitian's plan for your limit on milk and milk products in your diet.   · Avoid nuts, peanut butter, seeds, lentils, beans, organ meats, and sardines. · Avoid cola drinks. · Avoid bran breads or bran cereals. They are high in phosphorus. Where can you learn more? Go to http://www.gray.com/  Enter [id number] in the search box to learn more about \"Diabetic Renal Diet: Care Instructions. \"  Current as of: August 31, 2020               Content Version: 12.8  © 2006-2021 [employer]. Care instructions adapted under license by [employer] (which disclaims liability or warranty for this information). If you have questions about a medical condition or this instruction, always ask your healthcare professional. [place] [address] any warranty or liability for your use of this information. Kidney Disease and Diabetes: Care Instructions  Your Care Instructions     When you have diabetes, your body cannot make enough insulin or use it the way it should. Your body needs insulin to help sugar move from the blood to the cells. Without it, your blood sugar gets too high. High blood sugar damages your kidneys and makes it hard for them to filter blood. This causes fluid and waste to build up in your blood. If you have diabetes, it is very important to keep your blood sugar in your target range. There are many steps you can take to do this. If you can control your blood sugar, you will have the best chance to slow or stop damage to your kidneys. Follow-up care is a key part of your treatment and safety. Be sure to make and go to all appointments, and call your doctor if you are having problems. It's also a good idea to know your test results and keep a list of the medicines you take. How can you care for yourself at home? To control your diabetes and slow or stop damage to your kidneys  · Keep your blood sugar in your target range. The American Diabetes Association recommends a hemoglobin A1c (Hb A1c) target level of less than 7%. Talk to your doctor about your target.  The lower your A1c, the better your chance of stopping kidney damage. · Keep your blood pressure in your target range. Doctors recommend specific types of blood pressure medicines for people who have diabetes and kidney disease. Examples include ACE inhibitors and angiotensin II receptor blockers (ARBs). Your doctor may have you take one of these even if you don't have high blood pressure. · Take all of your medicines. You may have several. For example, you may take medicines for diabetes, cholesterol, and blood pressure. It's very important to take all of them just as your doctor tells you to and to keep taking them. · Make good food choices. Follow an eating plan that is best for your diabetes and your kidneys. You may want to work with a dietitian to make a plan. A good plan will make sure that you spread carbohydrate throughout the day. It will also make sure that you get the right amount of salt (sodium), fluids, and protein. · Stay at a healthy weight. If you need help to lose weight, talk to your doctor or dietitian. Even small changes can make a difference. Try to be aware of your portion sizes, eat more fruits and vegetables, and add some activity to your daily routine. · Exercise. Get at least 30 minutes of activity on most days of the week. Walking is a great exercise that most people can do. Being more active can help you control your blood sugar and stay at a healthy weight. It also can help you lower cholesterol and blood pressure. To improve your kidney health  · Lower your cholesterol. Keep your LDL less than 100 mg/dL and HDL levels more than 40 mg/dL for men and 50 mg/dL for women. If you have high cholesterol, your doctor may prescribe medicine. He or she may also tell you to eat less saturated fat. · Follow your treatment plan. Check your blood sugar as many times a day as your doctor recommends. Go to all of your follow-up appointments, and be sure to have all the tests your doctor orders.  Call your doctor if you think you are having a problem with your medicines. · Take a low-dose aspirin every day if your doctor suggests it. Most doctors believe this can reduce the risk of heart disease and stroke. Your risk of these diseases is much greater than your risk of kidney failure. · Avoid tobacco. Do not smoke or use other tobacco products. If you need help quitting, talk to your doctor about stop-smoking programs and medicines. These can increase your chances of quitting for good. When should you call for help? Call 911 anytime you think you may need emergency care. For example, call if:    · You passed out (lost consciousness). Call your doctor now or seek immediate medical care if:    · You have new or worse nausea and vomiting.     · You have much less urine than normal, or you have no urine.     · You are feeling confused or cannot think clearly.     · You have new or more blood in your urine.     · You have new swelling.     · You are dizzy or lightheaded, or you feel like you may faint. Watch closely for changes in your health, and be sure to contact your doctor if:    · You do not get better as expected. Where can you learn more? Go to http://www.gray.com/  Enter C726 in the search box to learn more about \"Kidney Disease and Diabetes: Care Instructions. \"  Current as of: December 17, 2020               Content Version: 12.8  © 7654-5714 [employer]. Care instructions adapted under license by [employer] (which disclaims liability or warranty for this information). If you have questions about a medical condition or this instruction, always ask your healthcare professional. [address] any warranty or liability for your use of this information.

## 2021-09-10 NOTE — PROGRESS NOTES
INTERNISTS OF [facility]:  9/10/2021, MRN: [id number]      Bro Javed is a 39 y.o. male and presents to clinic for Follow-up and Labs (printed for review)      Subjective: The patient is a 70-year-old male with history of hypertension, type 2 diabetes, lumbar facet arthropathy, obesity, left transmetatarsal amputation secondary to a diabetic foot infection, and HLD. 1. Type 2 DM:  Present for yrs. He was well controlled until later this yr at which time his A1C was in the 9 range. He was also found to have worsening in his creatinine to the 2 range. Since then: He is checking his BG twice a day (fasting before breakfast and 2 hours after lunch). He stopped snacking at night. He is cutting back on his carbs. He was referred to [facility] but has yet to hear back from them - thus an apt has yet to be scheduled. No urinary sx. 2. HTN: BP was elevated at his last apt. It was 192/101. He was asymptomatic and remains this way. He was started on coreg to take with lasix and losartan. He was also told to schedule an apt with Sleep Medicine for FINA evaluation - given his h/o HTN and snoring. He reports some fatigue since starting coreg. No other side effects. BP today is better but still high at 171/95.      Patient Active Problem List    Diagnosis Date Noted    Erectile dysfunction 01/05/2020    Snoring 01/05/2020    Anemia 01/05/2020    Hyperlipidemia LDL goal <70 12/14/2018    Cataract of both eyes 10/08/2018    Hypertensive retinopathy of both eyes 10/08/2018    Essential hypertension 09/06/2018    Controlled type 2 diabetes mellitus, without long-term current use of insulin ([address] 06/19/2018    Proliferative diabetic retinopathy of both eyes ([address] 06/19/2018    Obesity, morbid, BMI 40.0-49.9 ([address] 05/20/2018    Lumbar facet arthropathy 05/20/2018       Current Outpatient Medications   Medication Sig Dispense Refill    TRUEplus Lancets 33 gauge misc USE TO CHECK BLOOD SUGAR TWICE DAILY      carvediloL (Coreg) 12.5 mg tablet Take 1 Tablet by mouth two (2) times daily (with meals). 60 Tablet 6    fluticasone propionate (FLONASE) 50 mcg/actuation nasal spray Use 2 spray(s) in each nostril once daily 16 g 5    Blood-Glucose Meter (True Metrix Glucose Meter) misc Check glucose twice daily. DX X72.73T6 50 Each 5    glucose blood VI test strips (blood glucose test) strip Check glucose twice daily. DX E11.37X9 350 Strip 5    Trulicity 6.00 XS/2.4 mL sub-q pen INJECT 1 PEN SUBCUTANEOUSLY ONCE A WEEK 12 mL 5    lancets misc USE TO CHECK BLOOD SUGAR TWICE DAILY 100 Each 5    losartan (COZAAR) 100 mg tablet Take 1 tablet by mouth once daily 90 Tablet 1    Jardiance 25 mg tablet Take 1 tablet by mouth once daily 30 Tab 5    furosemide (LASIX) 20 mg tablet Take 1 tablet by mouth once daily 30 Tab 5    atorvastatin (LIPITOR) 40 mg tablet Take 1 tablet by mouth once daily 90 Tab 2    ezetimibe (ZETIA) 10 mg tablet Take 1 Tab by mouth daily. 90 Tab 3    Blood-Glucose Meter monitoring kit Use to check blood sugar 2 times per day. 1 Kit 0       Allergies   Allergen Reactions    Amlodipine Swelling     Had swelling in his legs and feet - states was painful to walk       Past Medical History:   Diagnosis Date    HI (acute [facility]) ([facility]     Diabetes ([facility]     Hypertension        Past Surgical History:   Procedure Laterality Date    HX AMPUTATION TOE         No family history on file. Social History     Tobacco Use    Smoking status: Never Smoker    Smokeless tobacco: Never Used   Substance Use Topics    Alcohol use: No       ROS   Review of Systems   Constitutional: Negative for chills and fever. HENT: Negative for ear pain and sore throat. Eyes: Negative for blurred vision and pain. Respiratory: Negative for cough and shortness of breath. Cardiovascular: Negative for chest pain. Gastrointestinal: Negative for abdominal pain and blood in stool.    Genitourinary: Negative for dysuria and hematuria. Musculoskeletal: Negative for joint pain and myalgias. Skin: Negative for rash. Neurological: Negative for tingling, focal weakness and headaches. Endo/Heme/Allergies: Does not bruise/bleed easily. Psychiatric/Behavioral: Negative for substance abuse. Objective     Vitals:    09/10/21 1335 09/10/21 1344   BP: (!) 179/99 (!) 171/95   Pulse: 77    Resp: 16    Temp: 98.2 °F (36.8 °C)    TempSrc: Temporal    SpO2: 98%    Weight: 303 lb (137.4 kg)    Height: 5' 6\" (1.676 m)    PainSc:   2    PainLoc: Foot        Physical Exam  Vitals and nursing note reviewed. Constitutional:       Appearance: Normal appearance. HENT:      Head: Normocephalic and atraumatic. Right Ear: External ear normal.      Left Ear: External ear normal.   Eyes:      Conjunctiva/sclera: Conjunctivae normal.   Cardiovascular:      Rate and Rhythm: Normal rate and regular rhythm. Pulses: Normal pulses. Heart sounds: Normal heart sounds. Pulmonary:      Effort: Pulmonary effort is normal.      Breath sounds: Normal breath sounds. Abdominal:      General: Abdomen is flat. Bowel sounds are normal.      Palpations: Abdomen is soft. Musculoskeletal:         General: No swelling (BUE) or tenderness (BUE). Cervical back: Neck supple. Lymphadenopathy:      Cervical: No cervical adenopathy. Skin:     General: Skin is warm and dry. Findings: No erythema. Neurological:      Mental Status: He is alert.       Gait: Gait normal.   Psychiatric:         Mood and Affect: Mood normal.         LABS   Data Review:   Lab Results   Component Value Date/Time    WBC 10.0 09/03/2021 12:00 AM    HGB 12.3 (L) 09/03/2021 12:00 AM    HCT 37.1 (L) 09/03/2021 12:00 AM    PLATELET 834 74/46/6756 12:00 AM    MCV 82 09/03/2021 12:00 AM       Lab Results   Component Value Date/Time    Sodium 140 08/09/2021 12:00 AM    Potassium 4.7 08/09/2021 12:00 AM    Chloride 105 08/09/2021 12:00 AM    CO2 22 08/09/2021 12:00 AM Anion gap 5 03/31/2021 07:04 PM    Glucose 190 (H) 08/09/2021 12:00 AM    BUN 42 (H) 08/09/2021 12:00 AM    Creatinine 2.11 (H) 08/09/2021 12:00 AM    BUN/Creatinine ratio 20 08/09/2021 12:00 AM    GFR est AA 45 (L) 08/09/2021 12:00 AM    GFR est non-AA 39 (L) 08/09/2021 12:00 AM    Calcium 9.0 08/09/2021 12:00 AM       Lab Results   Component Value Date/Time    Cholesterol, total 183 03/23/2021 10:13 AM    HDL Cholesterol 36 (L) 03/23/2021 10:13 AM    LDL, calculated 111 (H) 03/23/2021 10:13 AM    LDL, calculated 156 (H) 07/24/2020 09:46 AM    VLDL, calculated 36 03/23/2021 10:13 AM    VLDL, calculated 69 (H) 07/24/2020 09:46 AM    Triglyceride 206 (H) 03/23/2021 10:13 AM       Lab Results   Component Value Date/Time    Hemoglobin A1c 9.4 (H) 08/02/2021 09:16 AM       Assessment/Plan:   1. Essential hypertension and CKD: BP is still not at goal.  - We discussed the need to reduce his salt intake. - We discussed the need to maintain a renal diabetic diet. - Referral to a nutritionist for assistance. - C/w rx as prescribed. Adding hydralazine.   - Increasing his coreg to 25mg bid for better BP control as well  - Pt instructed to f/u with Nephrology, who he is scheduled to see later this year. - Checking a BMP    ORDERS:  - REFERRAL TO NUTRITION  - hydrALAZINE (APRESOLINE) 25 mg tablet; Take 1 Tablet by mouth three (3) times daily. Dispense: 90 Tablet; Refill: 5  - carvediloL (Coreg) 25 mg tablet; Take 1 Tablet by mouth two (2) times daily (with meals). Dispense: 60 Tablet; Refill: 5  - METABOLIC PANEL, BASIC; Future    2. Controlled type 2 diabetes mellitus:   - C/w a diabetic low carb diet  - Referral to a nutritionist  - Checking another A1C later this yr  - C/w rx as prescribed for now. We will need to closely monitor his renal function. ORDERS:  - REFERRAL TO NUTRITION  - METABOLIC PANEL, BASIC; Future  - HEMOGLOBIN A1C WITH EAG;  Future      Health Maintenance Due   Topic Date Due    Hepatitis C Screening  Never done    Flu Vaccine (1) 09/01/2021     Lab review: labs are reviewed in the EHR and ordered as mentioned above    I have discussed the diagnosis with the patient and the intended plan as seen in the above orders. The patient has received an after-visit summary and questions were answered concerning future plans. I have discussed medication side effects and warnings with the patient as well. I have reviewed the plan of care with the patient, accepted their input and they are in agreement with the treatment goals. All questions were answered. The patient understands the plan of care. Handouts provided today with above information. Pt instructed if symptoms worsen to call the office or report to the ED for continued care. Greater than 50% of the visit time was spent in counseling and/or coordination of care. Voice recognition was used to generate this report, which may have resulted in some phonetic based errors in grammar and contents. Even though attempts were made to correct all the mistakes, some may have been missed, and remained in the body of the document. Follow-up and Dispositions    · Return in about 3 months (around 12/10/2021) for BP check, DM check.          Gadiel Magallanes MD

## 2021-09-12 PROBLEM — N18.32 STAGE 3B CHRONIC KIDNEY DISEASE (HCC): Status: ACTIVE | Noted: 2021-09-12

## 2021-09-28 ENCOUNTER — HOSPITAL ENCOUNTER (OUTPATIENT)
Dept: NUTRITION | Age: 36
Discharge: HOME | End: 2021-09-28
Attending: INTERNAL MEDICINE
Payer: MEDICAID

## 2021-09-28 PROCEDURE — 97802 MEDICAL NUTRITION INDIV IN: CPT

## 2021-09-29 ENCOUNTER — TELEPHONE (OUTPATIENT)
Dept: SURGERY | Age: 36
End: 2021-09-29

## 2021-10-01 DIAGNOSIS — E78.5 HYPERLIPIDEMIA LDL GOAL <70: ICD-10-CM

## 2021-10-01 RX ORDER — ATORVASTATIN CALCIUM 40 MG/1
40 TABLET, FILM COATED ORAL DAILY
Qty: 90 TABLET | Refills: 3 | Status: CANCELLED | OUTPATIENT
Start: 2021-10-01

## 2021-10-01 NOTE — TELEPHONE ENCOUNTER
This patient contacted office for the following prescriptions to be filled:    Last office visit: 9/10/21  Follow up appointment: 11/9/21  Medication requested :   Requested Prescriptions     Pending Prescriptions Disp Refills    atorvastatin (LIPITOR) 40 mg tablet 90 Tablet 3     Sig: Take 1 Tablet by mouth daily.        PCP: Dr. Lydia Ruelas  Mail order or Local pharmacy name: [facility]

## 2021-10-01 NOTE — PROGRESS NOTES
[employer] [facility] - Outpatient Nutrition Services     Nutrition Assessment - Medical Nutrition Therapy   Outpatient Initial Evaluation         Patient Name: Jayme Milton : 1985   Treatment Diagnosis: Type 2 diabetes  Kidney disease  HTN  Obesity   Referral Source: Cesar Little MD [facility]): 2021     Gender: male Age: 39 y.o. Ht: 66 in Wt: 300 lb  kg   BMI: 50 BMR   Male 2232 BMR Female      Past Medical History:  Type 2 diabetes  Obesity  HTN       Pertinent Medications:   lipitor  trulicity  Jardiance     Biochemical Data:   Lab Results   Component Value Date/Time    Hemoglobin A1c 9.4 (H) 2021 09:16 AM     Lab Results   Component Value Date/Time    Sodium 138 2021 12:00 AM    Potassium 4.5 2021 12:00 AM    Chloride 102 2021 12:00 AM    CO2 16 (L) 2021 12:00 AM    Anion gap 5 2021 07:04 PM    Glucose 151 (H) 2021 12:00 AM    BUN 50 (H) 2021 12:00 AM    Creatinine 2.35 (H) 2021 12:00 AM    BUN/Creatinine ratio 21 (H) 2021 12:00 AM    GFR est AA 39 (L) 2021 12:00 AM    GFR est non-AA 34 (L) 2021 12:00 AM    Calcium 9.2 2021 12:00 AM    Bilirubin, total 0.4 2021 05:22 PM    Alk.  phosphatase 88 2021 05:22 PM    Protein, total 8.2 2021 05:22 PM    Albumin 3.4 2021 05:22 PM    Globulin 4.8 (H) 2021 05:22 PM    A-G Ratio 0.7 (L) 2021 05:22 PM    ALT (SGPT) 29 2021 05:22 PM    AST (SGOT) 17 2021 05:22 PM     Lab Results   Component Value Date/Time    Cholesterol, total 183 2021 10:13 AM    HDL Cholesterol 36 (L) 2021 10:13 AM    LDL, calculated 111 (H) 2021 10:13 AM    LDL, calculated 156 (H) 2020 09:46 AM    VLDL, calculated 36 2021 10:13 AM    VLDL, calculated 69 (H) 2020 09:46 AM    Triglyceride 206 (H) 2021 10:13 AM     Lab Results   Component Value Date/Time    ALT (SGPT) 29 2021 05:22 PM    AST (SGOT) 17 03/31/2021 05:22 PM    Alk. phosphatase 88 03/31/2021 05:22 PM    Bilirubin, total 0.4 03/31/2021 05:22 PM     Lab Results   Component Value Date/Time    Creatinine 2.35 (H) 09/03/2021 12:00 AM     Lab Results   Component Value Date/Time    BUN 50 (H) 09/03/2021 12:00 AM     Lab Results   Component Value Date/Time    Microalb/Creat ratio (ug/mg creat.) 2,460 (H) 08/09/2021 12:00 AM        Assessment:   Patient is a 39year old male who visits RD for insight on how to lose weight, improve his Hgb A1c, improve his HTN and protect his kidneys. Patient's activity consists of a sedentary lifestyle. Patient's sleeping habits consists of interrupted sleep. Patient will have a sleep study soon. Food & Nutrition: Patient eats 2-3  meals a day consisting of starch, protein and sometimes vegetables. Breakfast may consist of boiled eggs, scrambled eggs, turkey sausage. A lunch choice may be rotisserie chicken,   Dinner choices consist of turkey burgers, wheat bread and a vegetables. Snacks may be skinny pop and trail mix. Estimate Needs   Calories: 1800 Protein: 80 Carbs: <185 Fat: 85   Kcal/day  g/day  g/day  g/day                            Nutrition Diagnosis Altered nutrition related laboratory values related to diabetes  as evidenced by Hgb A1c of 9.0. Altered nutrition related laboratory values related to kidney disease evidenced by a GFR of 34. Overweight/obesity related to excessive energy intake as evidenced by a BMI of 49. Excessive Carbohydrate intake related to food -and nutrition- knowledge deficit and food and nutrition compliance limitations as evidenced by Hyperglycemia (fasting BG > 126 mg/dL), and Hemoglobin A1c > 6%, and pt's food recall reveals high carbohydrate intake at meals and snacks. Nutrition Intervention &  Education: Encouraged pt to drink >64 ounces of only calorie free or very low calorie/carb beverages only.      Educated pt on all carbohydrates found in foods and encouraged no more than 35-40 gm/meal and 15-20 gm/snack. Educated pt on the pathophysiology of Type II Diabetes and insulin resistance and the rationale for dietary modification and increased activity. Patient was educated on the importance of making lifestyle changes such as:  1. Eating off a smaller plate and drinking from smaller glasses. 2. Increasing activity. 3. Menu planning and tracking. Recommend My Fitness Pal for tracking. 4. The importance of eating breakfast.  5. Appropriate snacks. 6. Portion control. 7. The importance of good sleeping habits. 8. How to read a label. Patient was encouraged to do this before purchasing and consuming an item. 9. Follow a kidney friendly diet. Eat low sodium, potassium and low phosphorus foods. Handouts Provided: [x]  Carbohydrates  [x]  Protein  [x]  Non-starchy Vegatbles  [x]  Food Label  [x]  Meal and Snack Ideas  [x]  Food Journals []  Diabetes  []  Cholesterol  []  Sodium  [x]  Gen Nutr Guidelines  []  SBGM Guidelines  [x]  Others: renal diet handouts. Information Reviewed with: Patient    Readiness to Change Stage:   []  Pre-contemplative    []  Contemplative  []  Preparation               [x]  Action                  []  Maintenance   Potential Barriers to Learning: []  Decline in memory    []  Language barrier   []  Other:  []  Emotional                  []  Limited mobility  []  Lack of motivation     [] Vision, hearing or cognitive impairment   Expected Compliance: Good. Nutritional Goal - To promote lifestyle changes to result in:    [x]  Weight loss  [x]  Improved diabetic control  []  Decreased cholesterol levels  []  Decreased blood pressure  []  Weight maintenance []  Preventing any interactions associated with food allergies  []  Adequate weight gain toward goal weight  [x]  Other:   Protect his kidneys. Patient Goals:   Patient desires to lose weight, improve his Hgb A1c and protect his kidneys.   He will increase his activity. He plans to monitor his carbohydrates. He will drink 1 gallon of water daily. Dietitian Signature:  Mela Delatorre RD Date: 09/28/2021   Follow-up: Scheduled:  11/03/2021  10:30 am

## 2021-10-06 ENCOUNTER — OFFICE VISIT (OUTPATIENT)
Dept: PULMONOLOGY | Age: 36
End: 2021-10-06
Payer: MEDICAID

## 2021-10-06 VITALS
WEIGHT: 306.2 LBS | HEIGHT: 66 IN | TEMPERATURE: 97.8 F | SYSTOLIC BLOOD PRESSURE: 118 MMHG | DIASTOLIC BLOOD PRESSURE: 70 MMHG | HEART RATE: 90 BPM | BODY MASS INDEX: 49.21 KG/M2 | OXYGEN SATURATION: 98 % | RESPIRATION RATE: 18 BRPM

## 2021-10-06 DIAGNOSIS — E11.8 CONTROLLED DIABETES MELLITUS TYPE 2 WITH COMPLICATIONS, UNSPECIFIED WHETHER LONG TERM INSULIN USE (HCC): ICD-10-CM

## 2021-10-06 DIAGNOSIS — R06.83 SNORING: Primary | ICD-10-CM

## 2021-10-06 DIAGNOSIS — E78.5 HYPERLIPIDEMIA LDL GOAL <70: ICD-10-CM

## 2021-10-06 DIAGNOSIS — Z87.891 EX-SMOKER FOR MORE THAN 1 YEAR: ICD-10-CM

## 2021-10-06 DIAGNOSIS — E66.01 MORBID OBESITY WITH BMI OF 45.0-49.9, ADULT (HCC): ICD-10-CM

## 2021-10-06 DIAGNOSIS — R06.2 WHEEZING: ICD-10-CM

## 2021-10-06 DIAGNOSIS — I10 HYPERTENSION, UNSPECIFIED TYPE: ICD-10-CM

## 2021-10-06 DIAGNOSIS — Z23 NEEDS FLU SHOT: ICD-10-CM

## 2021-10-06 PROCEDURE — 90694 VACC AIIV4 NO PRSRV 0.5ML IM: CPT | Performed by: INTERNAL MEDICINE

## 2021-10-06 PROCEDURE — 90471 IMMUNIZATION ADMIN: CPT | Performed by: INTERNAL MEDICINE

## 2021-10-06 PROCEDURE — 99205 OFFICE O/P NEW HI 60 MIN: CPT | Performed by: INTERNAL MEDICINE

## 2021-10-06 NOTE — LETTER
10/10/2021    Patient: Pita Paulino   YOB: 1985   Date of Visit: 10/6/2021     Fe Lawton, [address]  [address] [address]  Via In Basket    Dear Fe Lawton MD,      Thank you for referring Mr. Jose Ruvalcaba to [address] [place] [address] for evaluation. My notes for this consultation are attached. If you have questions, please do not hesitate to call me. I look forward to following your patient along with you.       Sincerely,    Kailash Pressley, DO (1) Other Diagnosis

## 2021-10-06 NOTE — PROGRESS NOTES
Pita Paulino presents today for   Chief Complaint   Patient presents with    Sleep Problem       Is someone accompanying this pt? No    Is the patient using any DME equipment during OV? No    -DME Company NA    Depression Screening:  3 most recent PHQ Screens 10/6/2021   Little interest or pleasure in doing things Several days   Feeling down, depressed, irritable, or hopeless Several days   Total Score PHQ 2 2       Learning Assessment:  Learning Assessment 1/31/2020   PRIMARY LEARNER Patient   HIGHEST LEVEL OF EDUCATION - PRIMARY LEARNER  SOME COLLEGE   BARRIERS PRIMARY LEARNER NONE   CO-LEARNER CAREGIVER No   PRIMARY LANGUAGE ENGLISH   LEARNER PREFERENCE PRIMARY DEMONSTRATION   ANSWERED BY patient   RELATIONSHIP SELF       Abuse Screening:  Abuse Screening Questionnaire 9/10/2021   Do you ever feel afraid of your partner? N   Are you in a relationship with someone who physically or mentally threatens you? N   Is it safe for you to go home? Y       Fall Risk  No flowsheet data found. Coordination of Care:  1. Have you been to the ER, urgent care clinic since your last visit? Hospitalized since your last visit? No    2. Have you seen or consulted any other health care providers outside of the [address] [place] [address] since your last visit? Include any pap smears or colon screening.  No

## 2021-10-06 NOTE — PROGRESS NOTES
Shaq Schaefer is a 39 y.o. male who presents for routine immunizations. He denies any symptoms , reactions or allergies that would exclude them from being immunized today. Risks and adverse reactions were discussed and the VIS was given to them. All questions were addressed. He was observed for 10 min post injection. There were no reactions observed.     Peterson Oconnor LPN

## 2021-10-06 NOTE — PROGRESS NOTES
[facility]  Pulmonary, Critical Care, and Sleep Medicine    Office Progress Note- Initial Evaluation      Primary Care Physician: Gigi Kent MD     Reason for Visit:  Evaluation for possible sleep breathing disorder    Assessment:  1. Snoring: Patient has symptoms and exam findings highly suggestive of a sleep breathing disorder, such as FINA. Given severity of symptoms and comorbidities additional  sleep testing is indicated. 2. Excessive Daytime Sleepiness (EDS): Could be do to several factors, to include possible FINA  3. Vivid Dreams and Sleep paralysis: Monitor for now  4. Abnormal Lung Exam: Isolated Rhonchi/wheezing: Left upper, anterior lung field: noted on exam today  5. Possible Periodic Limb Movement Disorder vs neuropathy:  Monitor for now  6. Hypertension  7. Diabetes  8. Headaches  9. Labile Mood  10. Hyperlipidemia  11. Morbid Obesity: Body mass index is 49.42 kg/m². Plan:  · Flu shot today- patient agreeable  · CXR  · Schedule patient for home sleep study for further evaluation. · Potential consequences of untreated sleep apnea, and/or excessive daytime sleepiness were discussed with the patient. · Educational materials provided. · Treatment options including CPAP, dental appliance, weight reduction measures, positional therapy, surgeries etc were discussed. · Healthy lifestyle changes to include weight loss and exercise discussed. · Healthy sleep habits were reviewed and encouraged. ·  and workplace safety reviewed and discussed as appropriate. Drowsy and/or inattentive driving should be avoided. · Follow up with Primary Care Provider (PCP) as directed and for routine health care maintenance. · Follow-up: after sleep testing, sooner should new symptoms or problems arise. History of Present Illness: Mr. Douglas Hawkins is a 39 y.o. male patient who presents for evaluation for possible FINA.  The history was provided by the patient. Occupation:   Disability                       Driving:  yes  Drowsy Driving: is not reported. Motor vehicle accident(s) associated with drowsy driving have not occurred. Snoring: is a problem. This is a Chronic problem which has been ongoing for years. Witnessed apneas are reported by his wife. Fatigue: is a problem. This is a Chronic problem which has been ongoing for years. Dental: Teeth clenching or grinding is not reported. Naps: are reported and are spontaneous- especially if not engaged in an activity. Leg Symptoms: He does not have unpleasant or crawling sensation in legs or strong urge to move when inactive. Pain: Pain, typically does disturb their sleep: Left foot -2018- all 5 toes amputated. GERD: is reported but only with certain foods which he tries to avoid (marinara sauce)    Mood: The patient describes their mood as: up and down. Denies thoughts of harming self or others. Sleep-Wake History:       Estimates sleeping approximately 5-7 hours per night/day. Reports sleeping in a Bed with 2 pillows under their head. He gets into bed at approximately 2400. Once in bed,he watches videos on his phone and then falls asleep. It usually takes up to 10 minutes to fall asleep after going to bed. He normally awakens with and without an alarm to start his day at [id number]. He  typically gets out of bed as soon as he wakes up . Reports waking up from sleep to use the bathroom 2-3 time(s). Vivid dreams are reported approximately 3 times weekly. Waking up from sleep with a headache is reported about 2 times weekly. Awakening with a dry mouth is reported. Symptom(s) suggestive of cataplexy are not reported. Sleep paralysis is reported but events are rare. Hallucinations: is not  reported. Sleep walking is not  reported. Sleep talking is reported.     Other unusual and/or parasomnia behaviors is reported: His wife has told him that he kicks in his sleep but he is not aware of this. Family Sleep History:   Not Known        Stop Benjy Salomon 10/6/2021   Does the patient snore loudly (louder than talking or loud enough to be heard through closed doors)? 1   Does the patient often feel tired, fatigued, or sleepy during the daytime, even after a \"good\" night's sleep? 1   Has anyone ever observed the patient stop breathing during their sleep? 1   Does the patient have or are they being treated for high blood pressure? 1   Is the patient's BMI greater than 35? 1   Is your neck circumference greater than 17 inches (Male) or 16 inches (Female)? 1   Is the patient older than 48? 0   Is the patient male?  1   FINA Score 7       3 most recent PHQ Screens 10/6/2021   Little interest or pleasure in doing things Several days   Feeling down, depressed, irritable, or hopeless Several days   Total Score PHQ 2 2       Cannon Scale 10/6/2021   Sitting and Reading 1   Watching TV 2   Sitting, inactive in a public place (e.g. a movie theater or meeting) 1   As a passenger in a car for an hour, without a break 2   Lying down to rest in the afternoon, when circumstances permit 1   Sitting and talking to someone 0   Sitting quietly after lunch without alcohol 1   In a car, while stopped for a few minutes in traffic 0   Cannon Sleepiness Score 8             Immunization History:  Immunization History   Administered Date(s) Administered    COVID-19, PFIZER, MRNA, LNP-S, PF, 30MCG/0.3ML DOSE 04/05/2021, 04/26/2021    Influenza Vaccine (Quad) PF (>6 Mo Flulaval, Fluarix, and >3 Yrs [address] [place] [address]) 10/08/2018, 12/18/2019, 12/03/2020    Pneumococcal Polysaccharide (PPSV-23) 09/06/2018    Tdap 10/08/2018       Past Medical History:  Past Medical History:   Diagnosis Date    [facility]     Diabetes [facility]     Diabetes mellitus ([facility]     Hypertension        Past Surgical History:  Past Surgical History:   Procedure Laterality Date    HX AMPUTATION TOE         Family History:  No family history on file. Social History:  Social History     Tobacco Use    Smoking status: Never Smoker    Smokeless tobacco: Never Used   Substance Use Topics    Alcohol use: No    Drug use: No        Caffeine Amount Time of last Intake Comments   Coffee Rare     Soda None     Tea None     Energy Drinks None     Over- the - counter stimulant pills None     Other Substances      Alcohol None     Tobacco None     Drugs None     Other: None         Medications:  Current Outpatient Medications on File Prior to Visit   Medication Sig Dispense Refill    atorvastatin (LIPITOR) 40 mg tablet Take 1 tablet by mouth once daily 90 Tablet 3    TRUEplus Lancets 33 gauge misc USE TO CHECK BLOOD SUGAR TWICE DAILY      hydrALAZINE (APRESOLINE) 25 mg tablet Take 1 Tablet by mouth three (3) times daily. 90 Tablet 5    carvediloL (Coreg) 25 mg tablet Take 1 Tablet by mouth two (2) times daily (with meals). 60 Tablet 5    fluticasone propionate (FLONASE) 50 mcg/actuation nasal spray Use 2 spray(s) in each nostril once daily 16 g 5    Blood-Glucose Meter (True Metrix Glucose Meter) misc Check glucose twice daily. DX S27.32D5 50 Each 5    glucose blood VI test strips (blood glucose test) strip Check glucose twice daily. DX E11.37X9 611 Strip 5    Trulicity 7.39 FE/4.0 mL sub-q pen INJECT 1 PEN SUBCUTANEOUSLY ONCE A WEEK 12 mL 5    lancets misc USE TO CHECK BLOOD SUGAR TWICE DAILY 100 Each 5    losartan (COZAAR) 100 mg tablet Take 1 tablet by mouth once daily 90 Tablet 1    Jardiance 25 mg tablet Take 1 tablet by mouth once daily 30 Tab 5    ezetimibe (ZETIA) 10 mg tablet Take 1 Tab by mouth daily. 90 Tab 3    Blood-Glucose Meter monitoring kit Use to check blood sugar 2 times per day. 1 Kit 0     No current facility-administered medications on file prior to visit.         Allergy:  Allergies   Allergen Reactions    Amlodipine Swelling     Had swelling in his legs and feet - states was painful to walk       Review of Systems  General ROS: positive for  - fatigue and sleep disturbance  negative for - chills, fever, hot flashes, malaise or night sweats  ENT ROS: negative for - epistaxis, nasal discharge, nasal polyps, oral lesions, sinus pain, sneezing, sore throat, tinnitus or vertigo, positive for - episodic rhinitis  Hematological and Lymphatic ROS: negative for - bleeding problems, blood clots, bruising, jaundice, pallor or swollen lymph nodes  Endocrine ROS: negative for - polydipsia/polyuria, skin changes, temperature intolerance or unexpected weight changes  Respiratory ROS: no cough, shortness of breath, or wheezing  Cardiovascular ROS: no chest pain or dyspnea on exertion  Gastrointestinal ROS: no abdominal pain, change in bowel habits, or black or bloody stools  Genito-Urinary ROS: no dysuria, trouble voiding, or hematuria  Musculoskeletal ROS: left foot- Toes: 1-5 amputated  Neurological ROS: no TIA or stroke symptoms, + dizziness when standing up rapidly. Dermatological ROS: negative for - pruritus, rash or skin lesion changes   Psychological ROS: as above   Otherwise negative and as above      Physical Exam:  Blood pressure 118/70, pulse 90, temperature 97.8 °F (36.6 °C), temperature source Oral, resp. rate 18, height 5' 6\" (1.676 m), weight 138.9 kg (306 lb 3.2 oz), SpO2 98 %. on RA, Body mass index is 49.42 kg/m². Neck circ. in \"inches\": 19.5    General: No distress, acyanotic, appears stated age, cooperative, pleasant, + Obese body Habitus  HEENT: PERRL, EOMI, throat without erythema or exudate, Tongue- large and with  dental indention on tongue, Mallampati's score 3+, Uvula- midline,    Neck: Supple,  no abnormally enlarged lymph nodes, thyroid is not enlarged, non-tender, no JVD, no carotid bruit  Chest: Normal.  Lungs: Moderate air entry, clear to auscultation bilaterally; EXCEPT: left, anterior lung field: + rhonchi and wheezing that does not clear with coughing. Heart: Regular rate and rhythm, S1S2 present, without murmur. Abdomen: Obese,  abdomen is soft without significant tenderness, or guarding. Extremity: Negative for cyanosis, edema, or clubbing. Skin: Skin color, texture, turgor normal. No rashes or lesions. Neurological: CN 2-12 grossly intact, normal muscle tone. Data Reviewed:  CBC:   Lab Results   Component Value Date/Time    WBC 10.0 09/03/2021 12:00 AM    HGB 12.3 (L) 09/03/2021 12:00 AM    HCT 37.1 (L) 09/03/2021 12:00 AM    PLATELET 629 47/35/8647 12:00 AM    MCV 82 09/03/2021 12:00 AM       BMP:   Lab Results   Component Value Date/Time    Sodium 138 09/03/2021 12:00 AM    Potassium 4.5 09/03/2021 12:00 AM    Chloride 102 09/03/2021 12:00 AM    CO2 16 (L) 09/03/2021 12:00 AM    Anion gap 5 03/31/2021 07:04 PM    Glucose 151 (H) 09/03/2021 12:00 AM    BUN 50 (H) 09/03/2021 12:00 AM    Creatinine 2.35 (H) 09/03/2021 12:00 AM    BUN/Creatinine ratio 21 (H) 09/03/2021 12:00 AM    GFR est AA 39 (L) 09/03/2021 12:00 AM    GFR est non-AA 34 (L) 09/03/2021 12:00 AM    Calcium 9.2 09/03/2021 12:00 AM        TSH:  Lab Results   Component Value Date/Time    TSH 1.930 09/03/2021 12:00 AM    TSH 1.990 12/18/2019 02:00 PM    TSH 1.420 06/05/2018 01:57 PM     Lab Results   Component Value Date/Time    Hemoglobin A1c 9.4 (H) 08/02/2021 09:16 AM     Results for Roby Rico (MRN [id number]) as of 10/10/2021 15:31   Ref.  Range 7/10/2018 14:25 12/18/2019 14:00 9/3/2021 00:00   Ferritin Latest Ref Range: 30 - 400 ng/mL 593 (H) 352 198     Imaging:  [x]I have personally reviewed the patients radiographs section     Interface, [facility] Rad Res - 04/27/2020  3:38 PM EDT   CHEST PA AND LATERAL     Clinical Indication/History: E11.621: Diabetic ulcer of left midfoot associated with type 2 diabetes mellitus, with fat layer exposed ([address]   L97.422: Diabetic ulcer of left midfoot associated with type 2 diabetes mellitus, with fat layer exposed ([address]    >Additional: Preop exam.     Comparison: X-ray 01/02/2020. Findings: The lungs appear clear. The cardiac silhouette and pulmonary vascularity appear within normal limits. No evidence for pneumothorax or pleural effusion. IMPRESSION     No acute cardiopulmonary disease.      Signed By: Ky Hoyt MD on 4/27/2020 3:36 PM     Date: 04/27/20   Received From: [address] [facility], [place]           Cardiac Echo:         Historical Sleep Testing Data:        Lake Prater DO, FCCP  Pulmonary, Sleep and Critical Care Medicine

## 2021-10-06 NOTE — PATIENT INSTRUCTIONS
Please call our clinic back at [phone] if you have not received a follow up appointment within 30 days prior the recommended follow up time. Please also call our office if you are not tolerating treatment plan and/or if you are experiencing any difficulties with the [employer] you may be using or is assigned to you. If you have a CPAP/BIPAP or home ventilator device, your DME company is supposed to provide you with replacement filters, tubing and masks. You can either call them when you need new supplies or you can arrange for an automatic shipment schedule. Your need to be seen by our office at least annually to renew the prescription for these supplies. Please make note of who your DME company is and their phone number. Please make sure that you clean your mask and hosing on a regular basis. Your DME can provide you with additional information regarding proper care and cleaning of your device- Thank you                            Vaccine Information Statement    Influenza (Flu) Vaccine (Inactivated or Recombinant): What You Need to Know    Many vaccine information statements are available in [place] and other languages. See www.immunize.org/vis. Hojas de información sobre vacunas están disponibles en español y en muchos otros idiomas. Visite www.immunize.org/vis. 1. Why get vaccinated? Influenza vaccine can prevent influenza (flu). Flu is a contagious disease that spreads around the [place] every year, usually between October and May. Anyone can get the flu, but it is more dangerous for some people. Infants and young children, people 72 years and older, pregnant people, and people with certain health conditions or a weakened immune system are at greatest risk of flu complications. Pneumonia, bronchitis, sinus infections, and ear infections are examples of flu-related complications.  If you have a medical condition, such as heart disease, cancer, or diabetes, flu can make it worse. Flu can cause fever and chills, sore throat, muscle aches, fatigue, cough, headache, and runny or stuffy nose. Some people may have vomiting and diarrhea, though this is more common in children than adults. In an average year, thousands of people in the [facility] die from flu, and many more are hospitalized. Flu vaccine prevents millions of illnesses and flu-related visits to the doctor each year. 2. Influenza vaccines     CDC recommends everyone 6 months and older get vaccinated every flu season. Children 6 months through 6years of age may need 2 doses during a single flu season. Everyone else needs only 1 dose each flu season. It takes about 2 weeks for protection to develop after vaccination. There are many flu viruses, and they are always changing. Each year a new flu vaccine is made to protect against the influenza viruses believed to be likely to cause disease in the upcoming flu season. Even when the vaccine doesnt exactly match these viruses, it may still provide some protection. Influenza vaccine does not cause flu. Influenza vaccine may be given at the same time as other vaccines. 3. Talk with your health care provider    Tell your vaccination provider if the person getting the vaccine:   Has had an allergic reaction after a previous dose of influenza vaccine, or has any severe, life-threatening allergies    Has ever had Guillain-Barré Syndrome (also called GBS)    In some cases, your health care provider may decide to postpone influenza vaccination until a future visit. Influenza vaccine can be administered at any time during pregnancy. People who are or will be pregnant during influenza season should receive inactivated influenza vaccine. People with minor illnesses, such as a cold, may be vaccinated. People who are moderately or severely ill should usually wait until they recover before getting influenza vaccine.     Your health care provider can give you more information. 4. Risks of a vaccine reaction     Soreness, redness, and swelling where the shot is given, fever, muscle aches, and headache can happen after influenza vaccination.  There may be a very small increased risk of Guillain-Barré Syndrome (GBS) after inactivated influenza vaccine (the flu shot). Mari Dc children who get the flu shot along with pneumococcal vaccine (PCV13) and/or DTaP vaccine at the same time might be slightly more likely to have a seizure caused by fever. Tell your health care provider if a child who is getting flu vaccine has ever had a seizure. People sometimes faint after medical procedures, including vaccination. Tell your provider if you feel dizzy or have vision changes or ringing in the ears. As with any medicine, there is a very remote chance of a vaccine causing a severe allergic reaction, other serious injury, or death. 5. What if there is a serious problem? An allergic reaction could occur after the vaccinated person leaves the clinic. If you see signs of a severe allergic reaction (hives, swelling of the face and throat, difficulty breathing, a fast heartbeat, dizziness, or weakness), call 9-1-1 and get the person to the nearest hospital.    For other signs that concern you, call your health care provider. Adverse reactions should be reported to the Vaccine Adverse Event Reporting System (VAERS). Your health care provider will usually file this report, or you can do it yourself. Visit the VAERS website at www.vaers. hhs.gov or call 3-959.952.3817. VAERS is only for reporting reactions, and VAERS staff members do not give medical advice. 6. The National Vaccine Injury Compensation Program    The [facility] Vaccine Injury Compensation Program (VICP) is a federal program that was created to compensate people who may have been injured by certain vaccines.  Claims regarding alleged injury or death due to vaccination have a time limit for filing, which may be as short as two years. Visit the VICP website at [facility].gov/vaccinecompensation or call 8-546.269.1548 to learn about the program and about filing a claim. 7. How can I learn more?  Ask your health care provider.  Call your local or state health department.  Visit the website of the Food and Drug Administration (FDA) for vaccine package inserts and additional information at www.fda.gov/vaccines-blood-biologics/vaccines.  Contact the Centers for Disease Control and Prevention (CDC):  - Call 2-480.867.6585 (1-800-CDC-INFO) or  - Visit CDCs influenza website at www.cdc.gov/flu. Vaccine Information Statement   Inactivated Influenza Vaccine   8/6/2021  42 LILLY Medrano [id number]   Department of Health and Human Services  Centers for Disease Control and Prevention    Office Use Only

## 2021-10-11 ENCOUNTER — DOCUMENTATION ONLY (OUTPATIENT)
Dept: INTERNAL MEDICINE CLINIC | Age: 36
End: 2021-10-11

## 2021-10-11 NOTE — PROGRESS NOTES
Outcome  Approvedtoday  PA Case: [id number], Status: Approved, Coverage Starts on: 10/11/2021 12:00:00 AM, Coverage Ends on: 10/11/2022 12:00:00 AM.  Drug  EpiPen 2-Eleuterio 0.3MG/0.3ML auto-injectors  Form  [employer] Electronic PA Form (3834 NCPDP)

## 2021-10-29 ENCOUNTER — HOSPITAL ENCOUNTER (OUTPATIENT)
Dept: LAB | Age: 36
Discharge: HOME | End: 2021-10-29

## 2021-10-29 ENCOUNTER — TELEPHONE (OUTPATIENT)
Dept: SURGERY | Age: 36
End: 2021-10-29

## 2021-10-29 ENCOUNTER — VIRTUAL VISIT (OUTPATIENT)
Dept: SURGERY | Age: 36
End: 2021-10-29
Payer: MEDICAID

## 2021-10-29 VITALS
BODY MASS INDEX: 48.55 KG/M2 | HEIGHT: 67 IN | WEIGHT: 309.3 LBS | TEMPERATURE: 99 F | HEART RATE: 94 BPM | DIASTOLIC BLOOD PRESSURE: 74 MMHG | SYSTOLIC BLOOD PRESSURE: 157 MMHG

## 2021-10-29 DIAGNOSIS — E66.01 MORBID OBESITY WITH BMI OF 45.0-49.9, ADULT (HCC): ICD-10-CM

## 2021-10-29 DIAGNOSIS — R06.83 SNORING: ICD-10-CM

## 2021-10-29 DIAGNOSIS — E11.37X9 CONTROLLED TYPE 2 DIABETES MELLITUS WITH DIABETIC MACULAR EDEMA RESOLVED AFTER TREATMENT, WITHOUT LONG-TERM CURRENT USE OF INSULIN, UNSPECIFIED LATERALITY (HCC): ICD-10-CM

## 2021-10-29 DIAGNOSIS — I10 HYPERTENSION, UNSPECIFIED TYPE: ICD-10-CM

## 2021-10-29 DIAGNOSIS — E66.01 MORBID OBESITY (HCC): ICD-10-CM

## 2021-10-29 DIAGNOSIS — K30 FUNCTIONAL DYSPEPSIA: Primary | ICD-10-CM

## 2021-10-29 LAB — XX-LABCORP SPECIMEN COL,LCBCF: NORMAL

## 2021-10-29 PROCEDURE — 99205 OFFICE O/P NEW HI 60 MIN: CPT | Performed by: NURSE PRACTITIONER

## 2021-10-29 PROCEDURE — 99001 SPECIMEN HANDLING PT-LAB: CPT

## 2021-10-29 NOTE — PROGRESS NOTES
Bariatric Surgery Consultation    Subjective: The patient is a 39 y.o. obese male with a Body mass index is 49.17 kg/m². .  The patient is at his heaviest weight for the past few years years. he has been overweight since 2018.   he has been considering surgery since past 2 months. he desires surgery at this time because of multiple health concerns and their lifestyle issues which are hindered by their weight. he has been referred by his family physician Dr Migdalia Frank for evaluation and treatment of their obesity via surgical intervention. Diana Hawley has tried multiple diets in his lifetime most recently tried behavior modification and unsupervised diets    Bariatric comorbidities present are   Patient Active Problem List   Diagnosis Code    Obesity, morbid, BMI 40.0-49.9 ([address] E66.01    Lumbar facet arthropathy M47.816    Controlled type 2 diabetes mellitus, without long-term current use of insulin ([address] E11.9    Proliferative diabetic retinopathy of both eyes ([address] M74.8698    Essential hypertension I10    Cataract of both eyes H26.9    Hypertensive retinopathy of both eyes H35.033    Hyperlipidemia LDL goal <70 E78.5    Erectile dysfunction N52.9    Snoring R06.83    Anemia D64.9    Stage 3b chronic kidney disease (HCC) N18.32    Hypertension I10    Diabetes ([address] E11.9    Morbid obesity ([address] E66.01    Morbid obesity with BMI of 45.0-49.9, adult (HCC) E66.01, Z68.42    Diabetes mellitus ([address] E11.9    Hx of diabetic foot ulcer Z86.31    HI (acute kidney injury) ([address] N17.9    Acid reflux K21.9       The patient is considering laparoscopic sleeve gastrectomy for surgical weight loss due to their ineffective progress with medical forms of weight loss and the urging of their physician who cares for their primary medical issues.  The patient  now presents  for consideration for weight loss surgery understanding the benefits of this over a medical approach of weight loss as was discussed in our presentation on weight loss surgery. They have discussed their plans both with their family and primary care physician who is in support of their pursuit of such. The patient has not had health issues as of late and denies and gastrointestinal disturbances other than what is outlined below in their review of symptoms. All of their prior evaluations available by both their PCP's and specialists physicians have been reviewed today either in the Care Everywhere portal or scanned under the media tab. I have spent a large portion of my initial consultation today reviewing the patients current dietary habits which have contributed to their health issues and obesity. I have suggested to them personally a dietary regimen that they can initiate now to help with their status as it pertains to their weight. They understand that the most important aspect of their journey through their weight loss endeavor will be their adherence to a new lifestyle of healthy eating behavior. They also understand that an adherence to an exercise program will not only help with weight loss but is ultimately important in weight maintenance. The patients goal weight is 190-200lb. We discussed BMI of 28 which is 175 lbs. These goals are consistent with expected outcomes of their desired operation. his Medical goals are resolution of these health issues.         Patient Active Problem List    Diagnosis Date Noted    Hypertension     Diabetes ([address]     Morbid obesity ([address]     Morbid obesity with BMI of 45.0-49.9, adult ([address]     Diabetes mellitus ([address]     Hx of diabetic foot ulcer     HI (acute kidney injury) ([address]     Acid reflux     Stage 3b chronic kidney disease ([address] 09/12/2021    Erectile dysfunction 01/05/2020    Snoring 01/05/2020    Anemia 01/05/2020    Hyperlipidemia LDL goal <70 12/14/2018    Cataract of both eyes 10/08/2018    Hypertensive retinopathy of both eyes 10/08/2018    Essential hypertension 09/06/2018    Controlled type 2 diabetes mellitus, without long-term current use of insulin ([facility] 06/19/2018    Proliferative diabetic retinopathy of both eyes ([facility] 06/19/2018    Obesity, morbid, BMI 40.0-49.9 ([facility] 05/20/2018    Lumbar facet arthropathy 05/20/2018     Past Surgical History:   Procedure Laterality Date    HX AMPUTATION TOE  2018      Social History     Tobacco Use    Smoking status: Never Smoker    Smokeless tobacco: Never Used   Substance Use Topics    Alcohol use: No      Family History   Problem Relation Age of Onset    Kidney Disease Mother         ESRD - HD      Current Outpatient Medications   Medication Sig Dispense Refill    ezetimibe (ZETIA) 10 mg tablet Take 1 tablet by mouth once daily 90 Tablet 3    Jardiance 25 mg tablet Take 1 tablet by mouth once daily 30 Tablet 3    atorvastatin (LIPITOR) 40 mg tablet Take 1 tablet by mouth once daily 90 Tablet 3    TRUEplus Lancets 33 gauge misc USE TO CHECK BLOOD SUGAR TWICE DAILY      hydrALAZINE (APRESOLINE) 25 mg tablet Take 1 Tablet by mouth three (3) times daily. 90 Tablet 5    carvediloL (Coreg) 25 mg tablet Take 1 Tablet by mouth two (2) times daily (with meals). 60 Tablet 5    fluticasone propionate (FLONASE) 50 mcg/actuation nasal spray Use 2 spray(s) in each nostril once daily 16 g 5    Blood-Glucose Meter (True Metrix Glucose Meter) misc Check glucose twice daily. DX H99.31N5 50 Each 5    glucose blood VI test strips (blood glucose test) strip Check glucose twice daily. DX E11.37X9 906 Strip 5    Trulicity 2.77 HJ/7.1 mL sub-q pen INJECT 1 PEN SUBCUTANEOUSLY ONCE A WEEK 12 mL 5    lancets misc USE TO CHECK BLOOD SUGAR TWICE DAILY 100 Each 5    losartan (COZAAR) 100 mg tablet Take 1 tablet by mouth once daily 90 Tablet 1    Blood-Glucose Meter monitoring kit Use to check blood sugar 2 times per day.  1 Kit 0     Allergies   Allergen Reactions    Amlodipine Swelling     Had swelling in his legs and feet - states was painful to walk          Review of Systems:       General - No history or complaints of unexpected fever, chills, or weight loss  Head/Neck - No history or complaints of headache, diplopia, dysphagia, hearing loss  Cardiac - No history or complaints of chest pain, palpitations, murmur, or shortness of breath  Pulmonary - No history or complaints of shortness of breath, productive cough, hemoptysis  Gastrointestinal - has mild reflux controlled by avoiding food triggers,no  abdominal pain, obstipation/constipation or blood per rectum  Genitourinary - No history or complaints of hematuria/dysuria, stress urinary incontinence symptoms, or renal lithiasis  Musculoskeletal - no joint pain,  no muscular weakness  Hematologic - No history or complaints of bleeding disorders,  No blood transfusions  Neurologic - No history or complaints of  migraine headaches, seizure activity, syncopal episodes, TIA or stroke  Integumentary - No history or complaints of rashes, abnormal nevi, skin cancer        Objective:     Visit Vitals  BP (!) 157/74 Comment: repeat BP: 143/65   Pulse 94   Temp 99 °F (37.2 °C)   Ht 5' 6.5\" (1.689 m)   Wt 140.3 kg (309 lb 4.8 oz)   BMI 49.17 kg/m²       Physical Examination:   General appearance - well appearing and in no distress  Mental status - alert, oriented to person, place, and time  Pulmonary - normal respiratory effort  Abdomen - no obvious distention  Neurological - normal speech, no focal findings or movement disorder noted  Extremities - normal movement  Musculoskeletal - moving extremities without difficulty  Skin - no rashes, no suspicious skin lesions noted      Labs:       Recent Results (from the past 1440 hour(s))   TSH AND FREE T4    Collection Time: 09/03/21 12:00 AM   Result Value Ref Range    TSH 1.930 0.450 - 4.500 uIU/mL    T4, Free 1.30 0.82 - 1.77 ng/dL   CBC WITH AUTOMATED DIFF    Collection Time: 09/03/21 12:00 AM   Result Value Ref Range    WBC 10.0 3.4 - 10.8 x10E3/uL    RBC 4. 55 4.14 - 5.80 x10E6/uL    HGB 12.3 (L) 13.0 - 17.7 g/dL    HCT 37.1 (L) 37.5 - 51.0 %    MCV 82 79 - 97 fL    MCH 27.0 26.6 - 33.0 pg    MCHC 33.2 31.5 - 35.7 g/dL    RDW 13.8 11.6 - 15.4 %    PLATELET 857 080 - 018 x10E3/uL    NEUTROPHILS 53 Not Estab. %    Lymphocytes 31 Not Estab. %    MONOCYTES 10 Not Estab. %    EOSINOPHILS 5 Not Estab. %    BASOPHILS 0 Not Estab. %    ABS. NEUTROPHILS 5.3 1.4 - 7.0 x10E3/uL    Abs Lymphocytes 3.1 0.7 - 3.1 x10E3/uL    ABS. MONOCYTES 1.0 (H) 0.1 - 0.9 x10E3/uL    ABS. EOSINOPHILS 0.5 (H) 0.0 - 0.4 x10E3/uL    ABS. BASOPHILS 0.0 0.0 - 0.2 x10E3/uL    IMMATURE GRANULOCYTES 1 Not Estab. %    ABS. IMM. GRANS. 0.1 0.0 - 0.1 x10E3/uL   IRON PROFILE    Collection Time: 09/03/21 12:00 AM   Result Value Ref Range    TIBC 259 250 - 450 ug/dL    UIBC 195 111 - 343 ug/dL    Iron 64 38 - 169 ug/dL    Iron % saturation 25 15 - 55 %   C REACTIVE PROTEIN, QT    Collection Time: 09/03/21 12:00 AM   Result Value Ref Range    C-Reactive Protein, Qt 6 0 - 10 mg/L   FERRITIN    Collection Time: 09/03/21 12:00 AM   Result Value Ref Range    Ferritin 198 30 - 432 ng/mL   METABOLIC PANEL, BASIC    Collection Time: 09/03/21 12:00 AM   Result Value Ref Range    Glucose 151 (H) 65 - 99 mg/dL    BUN 50 (H) 6 - 20 mg/dL    Creatinine 2.35 (H) 0.76 - 1.27 mg/dL    GFR est non-AA 34 (L) >59 mL/min/1.73    GFR est AA 39 (L) >59 mL/min/1.73    BUN/Creatinine ratio 21 (H) 9 - 20    Sodium 138 134 - 144 mmol/L    Potassium 4.5 3.5 - 5.2 mmol/L    Chloride 102 96 - 106 mmol/L    CO2 16 (L) 20 - 29 mmol/L    Calcium 9.2 8.7 - 10.2 mg/dL   CKD REPORT    Collection Time: 09/03/21 12:00 AM   Result Value Ref Range    Interpretation Note    SPECIMEN STATUS REPORT    Collection Time: 09/03/21 12:00 AM   Result Value Ref Range    SPECIMEN STATUS REPORT COMMENT    [facility] SPECIMEN COL    Collection Time: 10/29/21 12:21 PM   Result Value Ref Range    [facility] SPEC[facility]EN [facility].         Specimens collected/sent to [facility]. Please direct inquiries to ([phone]). Assessment:     Morbid obesity with comorbidity    Plan:     laparoscopic sleeve gastrectomy    This is a 39 y.o. male with a BMI of Body mass index is 49.17 kg/m². and the weight-related co-morbidties as noted below. Kannan Spann meets the NIH criteria for bariatric surgery based upon the BMI of Body mass index is 49.17 kg/m². and multiple weight-related co-morbidties. Kannan Spann has elected laparoscopic sleeve gastrectomy as his intervention of choice for treatment of morbid obestiy through surgical means secondary to its safety profile, rapid return to work  and decreases in operative risks over gastric bypass. In the office today, following Arie's history and physical examination, a 30 minute discussion regarding the anatomic alterations for the laparoscopic sleeve gastrectomy was undertaken. The dietary expectations and the patient and physician dependent factors for success were thoroughly discussed, to include the need for interval follow-up and long-term dietary changes associated with success. The possible complications of the sleeve gastrectomy  were also discussed, to include;death, DVT/PE, staple line leak, bleeding, stricture formation, infection, nutritional deficiencies and sleeve dilation. Specific weight related outcomes for success were also discussed with an emphasis on careful and close follow-up with the first year and eating behavior modification as the baseline and cyclical hunger return. The patient expressed an understanding of the above factors, and his questions were answered in their entirety. In addition, the patient watched a 1.5 hour power point seminar regarding obesity, surgical weight loss including, adjustable gastric band, gastric bypass, and sleeve gastrectomy. This discussion contrasted the different surgical techniques, mechanisms of actions and expected outcomes, and surgical and medical risks associated with each procedure. Today, the patient had all of his questions answered and desires to proceed with  bariatric surgery initially choosing sleeve gastrectomy as his surgical option. Did review recent labwork including CBC, thyroid function and metabolic panel. He is aware of elevated HgA1c from this spring and due to have repeat checked next month with his PCP. He understands needs for improved glycemic control prior to surgery. Secondary Diagnoses:     Dietary Intervention  - The patient is currently scheduled to see or has been followed by a bariatric nutritionist for an attempt at preoperative weight loss as has been dictated by their insurance carrier. They will be assessed at various times during their follow up to evaluate their progress depending on the length of time that is required once again by their carrier. I have explained the importance of preoperative weight loss and the benefits regarding lower surgical risk and also assisting the patient in reaching their weight loss goal.  Finally they understand there is a physiologic benefit from the standpoint of hepatic volume reduction and reduction of central visceral adiposity preoperatively. I have reiterated the importance of a low carbohydrate and high protein regimen to achieve their stated goal. I have reviewed their current eating behavior prior to this encounter and explained to them in an exhaustive fashion the appropriate diet that they should adhere to. They have been encouraged to loose weight pre operatively and understand it is our prerogative to cancel surgery or postpone their procedure in the event of significant weight gain. Hypertension - The patient has a clear understanding of how weight loss improves hypertension as a whole, but also they understand that there is a significant genetic component to this disease process.  We will monitor the patients blood pressure while in the hospital and the plan would be to continue those medications postoperatively.  If a diuretic is being used we will stop them on discharge to prevent dehydration particularly with the sleeve gastrectomy and the gastric bypass procedures.  They will be instructed to monitor their blood pressure postoperatively while at home and notify their primary care physician in the event of any significantly high or uncharacteristic readings. Adult Onset Diabetes - The patient has sarah given a very low carbohydrate diet preoperatively along with instructions to monitor their blood sugars on a regular daily basis. When  their surgery is performed  we will be monitoring the patient with sliding scale insulin and accuchecks.  Based on those values we will determine whether the patient needs a reduction of those medications postoperatively or total removal of those medications on discharge.  We will have the patient continue accuchecks postoperatively while at home also and report to me or their family physician for appropriate adjustments as needed.  The patient also understands that in the event of uncontrolled blood sugar preoperatively that we may choose to postpone their surgery. Hyperlipidemia - The patient understands that studies show that almost all patient will realize an improvement in their lipid profile with weight loss that occurs with these procedures. They however also understand that hyperlipidemia is a multifactorial disease particularly as it pertains to their genetic background and that there is no guarantee toward cure  of this issue. We will resume their medications both pre operatively and immediately postoperatively as this tends to decrease any post operative cardiac events.  The patient will follow up with their family physician in the postoperative period with plans to repeat their lipid panel 2-3 month postoperative for potential adjustment or removal of these medications.     Probable Obstructive Sleep Apnea -The patient understands the association of sleep apnea and obesity and the additional risk that it caries related to post surgical complications. If they have not been tested for sleep apnea and I feel they are at increased risk for this diagnosis, then they will be scheduled for a consultation with a Pulmonologist for such. In the event that they diamond this diagnosis we will have the patient bring their CPAP machine to the hospital for use both postoperatively in the PACU and on the floor at its appropriate setting.  We will have them continue using it while at home after surgery and follow up with their pulmonologist 6 months after to be retested to see if it can be discontinued at that time period. Has sleep study scheduled with Dr Latesha Velazco and will await those results. This visit with Mr Rom Mcgowan was performed under virtual telemedicine guidelines during the coronavirus (TUQYV-46) public health emergency on 10/29/21 in an interactive fashion using [employer]. me. They understand that this telemedicine encounter is a billable service, with coverage determined by their insurance carrier. They are aware that they may receive a bill and have provided verbal consent for this virtual visit. This visit was performed with the patient in their home environment and provider was present at [facility]. I have spent over 30 minutes on this visit  both prior to the visit reviewing the patients chart and with the patient face to face. I have reviewed their medical history, performed a telemedicine physical examination, and discussed the plan of action to date. They understand that they will be asked to come to the office when our office is allowed normal patient interaction, as dictated by public health officials, for a face-to-face visit to rediscuss all of the things we have talked about today.         Signed By: Isabella Zamora NP     October 29, 2021

## 2021-10-30 LAB — UREA BREATH TEST QL: POSITIVE

## 2021-11-01 ENCOUNTER — TELEPHONE (OUTPATIENT)
Dept: SURGERY | Age: 36
End: 2021-11-01

## 2021-11-01 ENCOUNTER — APPOINTMENT (OUTPATIENT)
Dept: INTERNAL MEDICINE CLINIC | Age: 36
End: 2021-11-01

## 2021-11-01 ENCOUNTER — OFFICE VISIT (OUTPATIENT)
Dept: INTERNAL MEDICINE CLINIC | Age: 36
End: 2021-11-01

## 2021-11-01 DIAGNOSIS — A04.8 H. PYLORI INFECTION: Primary | ICD-10-CM

## 2021-11-01 DIAGNOSIS — E11.8 TYPE 2 DIABETES MELLITUS WITH COMPLICATION, WITHOUT LONG-TERM CURRENT USE OF INSULIN (HCC): Primary | ICD-10-CM

## 2021-11-01 RX ORDER — OMEPRAZOLE 20 MG/1
20 CAPSULE, DELAYED RELEASE ORAL
Qty: 28 CAPSULE | Refills: 0 | Status: SHIPPED | OUTPATIENT
Start: 2021-11-01 | End: 2021-11-15

## 2021-11-01 RX ORDER — METRONIDAZOLE 500 MG/1
500 TABLET ORAL 2 TIMES DAILY
Qty: 28 TABLET | Refills: 0 | Status: SHIPPED | OUTPATIENT
Start: 2021-11-01 | End: 2021-11-15

## 2021-11-01 RX ORDER — CLARITHROMYCIN 500 MG/1
500 TABLET, FILM COATED ORAL 2 TIMES DAILY
Qty: 28 TABLET | Refills: 0 | Status: SHIPPED | OUTPATIENT
Start: 2021-11-01 | End: 2021-11-15

## 2021-11-01 NOTE — TELEPHONE ENCOUNTER
I called patient and explained that he has an H pylori infection and gave patient education on the condition. I verified that she has no drug allergies and sent triple therapy to her pharmacy. Patient is to contact our office with any problems, worsening of condition, questions or concerns. If we are not available or unable to be reached, patient is to proceed to the Emergency Department.

## 2021-11-01 NOTE — PATIENT INSTRUCTIONS
Your Visit Summary:  
 
Plan: 
- Continue current medications. Will reassess control after next A1c check Call me with any questions or concerns Lui Mejia [phone] Check and document your blood sugar first thing in the morning (fasting), 1-2 hours after a meal, and/or before bedtime. Bring your meter/log to all future visits. Your blood sugar goals: 
- Fasting (first thing in the morning)  blood sugar: 80 - 130  
- 1 to 2 hours after a meal: less than 180 When you experience symptoms of low blood sugar (example: less than 70): - Confirm low reading by checking your blood sugar.  
- Then treat with 15 grams of carbohydrates (one-half cup of juice or regular soda, or 4-5 glucose tablets). - Wait 15 minutes to recheck blood sugar.  
- Then eat a protein containing meal/snack to prevent another low blood sugar episode. (example: peanut butter + crackers) Nutrition: 
- When reviewing a nutrition label, focus on the serving size, total calories, fat (and type of fats), total carbohydrates, sugar (and amount of added sugar), amount of fiber (good for your digestive), and amount of protein. Refer to your nutrition label guide for more information. 
- For a meal : max 30 - 45 grams of carbohydrates - For a snack: max 15 grams of carbohydrates - Reduce amount of saturated and trans fat. Consider more unsaturated fat options as they are better for your heart health. - Have at least 1 serving of lean fat protein with each meal.   
- Increase fiber intake slowly to prevent constipation.  
- Substitute fruit juices for the whole fruit Low carb snack ideas (15 grams total carb or less): 
 
 String cheese or babybel with 6 crackers  4 peanut butter crackers  3 cups of popcorn  1 cup raw vegetables with hummus or ranch dip (just need to watch how much dip you use)  Nuts  2 rice cakes  Celery with peanut butter or cream cheese  String cheese with 1 serving of fruit  Greek yogurt (look at label to make sure < 15 gram carb)  Plain greek yogurt with fresh berries added [address] protein bar  Whisps parmesan cheese crisps  Hard boiled egg [address]  Tuna salad lettuce roll-ups  Deli meat roll-ups with slice of cheese  Sugar Free Jello  Glucerna shake (16 grams)  Glucerna hunger smart shake (16 grams)  Ensure protein max shake  Fruit (1 serving/15 grams)  1/2 banana, hannah, or grapefruit  1/3 melon (small cantaloupe)  1 slice or 1 cup of honeydew melon  1 slice or 1 and 1/4 cups of watermelon  1 small apple, peach, orange or pear  2 small tangerines  1 cup of raspberries  3/4 cup of blackberries, blueberries or pineapples  1/2 cup of fruit juice, pears, applesauce, or mangos  17 small grapes  12 cherries Be careful with the glucerna products as they differ in the total carbs depending on the product (some are intended as meal replacements not snacks). Make sure you look at the total carbs on the label as products can differ. Physical Activity: - Aim for 30 minutes of consistent, moderately intensive, physical activity a day for 5 days or an average of 150 minutes per week. - Start slow, increase as tolerated. For example: Walk every day, working up to 30 minutes of brisk walking, 5 days a weekor split the 30 minutes into two 15-minute or three 10-minute walks. - If you sit for a long time, get up and move/stretch every 90 minutes. Other recommendations: - Schedule an annual eye exam. 
- Check your feet daily for any signs of open wounds, cuts, or sores. - Given your risk factors, the following vaccines are recommended: annual flu shot, age-based pneumococcal vaccines (Pneumovax, Prevnar 13). In addition to taking your medications as directed, improving your blood sugar involves modifying your nutrition and maximizing the amount of physical activity.

## 2021-11-01 NOTE — Clinical Note
Hello, Finally got see Mr. Hardwick. Sounds like he is doing much better with his diet (also seeing a nutritionist) and his blood sugars sound like they are doing much better (didn't have meter or a log). Held off on any adjustments since he is about to get labs again, but would increase Trulicity to 1.5 mg once weekly dose if needed. He follows up with you next week. Thanks, Elvia Flores

## 2021-11-03 ENCOUNTER — HOSPITAL ENCOUNTER (OUTPATIENT)
Dept: NUTRITION | Age: 36
Discharge: HOME | End: 2021-11-03
Attending: INTERNAL MEDICINE
Payer: MEDICAID

## 2021-11-03 LAB
EST. AVERAGE GLUCOSE BLD GHB EST-MCNC: 134 MG/DL
HBA1C MFR BLD: 6.3 % (ref 4.8–5.6)
SPECIMEN STATUS REPORT, ROLRST: NORMAL

## 2021-11-03 PROCEDURE — 97803 MED NUTRITION INDIV SUBSEQ: CPT

## 2021-11-03 NOTE — PROGRESS NOTES
Pharmacy Progress Note - Diabetes Management    Assessment / Plan:   Diabetes Management:  - Per ADA guidelines, Pt's A1c is not at goal of < 7%. - Current SMBG(s)/CGM trend appears to be improving and at goals at home per patient report   - During the visit today reviewed with patient: self-monitoring blood glucose fasting/post-prandial goals and/or technique, importance of blood glucose control in avoidance of diabetic complications or further progression if already present, signs/symptoms/management of hypoglycemia, impact of exercise on glucose control, and diet  - Patient is set to get labs this week, so will continue current medications for now and reassess control after next PCP visit   - If more support needed, would recommend to increase Trulicity to 1.5 mg once weekly dose  - Continuing to monitor kidney function to ensure appropriateness of Jardiance     Nutrition/Lifestyle Modifications:  - Patient is also seeing a nutritionist and has made great adjustments to his diet and has increased his exercise level  - Reviewed with patient utilization of the plate method as a way to encourage healthy eating. Reviewed carbohydrate and non-carbohydrate rich foods, carbohydrate content of various foods, appropriate serving sizes for carbohydrates (15 grams = 1 carb serving), reading the nutrition label focusing on total carbohydrates while being mindful of serving size, recommended serving sizes for carbohydrates for meals and snacks (45-60g with meals and less than or equal to 15g for snacks ), and discussion regarding fruits as a carbohydrate.   Patient education materials were provided and reviewed with the patient for their future reference and use  - Encouraged patient to keep up the great work with exercise and aim for 5 days per week, 30 minutes of moderate intensity exercise each          S/O: Mr. Michael Sharpe is a 39 y.o. male, referred by Dr. Omer Ward MD was seen today for diabetes management visit. Patient's last A1c was 9.4% in August 2021. Current anti-hyperglycemic regimen include(s):    - Jardiance 25 mg once daily  - Trulicity 5.43 mg once weekly    Patient reports adherence to his medication regimen. ROS:  Today, Pt endorses:  - Symptoms of Hyperglycemia: none  - Symptoms of Hypoglycemia: none    Self Monitoring Blood Glucose (SMBG) or CGM:  - Brought in home glucometer/blood glucose log/CGM reader today:  no  - Conducts/scans: 1-2x per day    - Fasting BG this am was 120 per patient   - If he checks his blood sugars in the evening, they will usually be 150-170     Nutrition/Lifestyle Modifications:  - Eats 2-3 meals/day ;  - Breakfast: Scrambled eggs and turkey sausage or boiled eggs with oatmeal. States he sometimes will skip breakfast   - Lunch/Dinner: Varies - chicken and broccoli, turkey burgers, +/- brown rice. May eat eggs for dinner as well. Likes vegetables. Trying to stay away from bread and pasta   - Snack(s): Has limited fruit.  Likes popcorn and will sometimes have wafers, but tries to stay away from sweets   - Beverage(s): Mainly water and every blue moon will have black coffee    - Physical Activity:   - Patient has gotten back in the gym and is lifting about 3-4 times per week   - He states that he is not able to do much cardio due to pain in his left foot (had surgery in the past and had a few toes amputated)    Past Medical History:   Diagnosis Date    Acid reflux     avoids food triggers    HI (acute kidney injury) ([address] 04/2021    Anemia 1/5/2020    Cataract of both eyes 10/8/2018    Diabetes ([address]     Diabetes mellitus ([address]     Dx'd 2018, HgA1c 9.4% 3/2021    Hx of diabetic foot ulcer     7/2021    Hyperlipidemia LDL goal <70 12/14/2018    Hypertension     Dx'd 2018    Morbid obesity ([address]     Morbid obesity with BMI of 45.0-49.9, adult ([address]     Proliferative diabetic retinopathy of both eyes ([address] 6/19/2018    Snoring     consult Dr Dominique Montaño 10/21 for sleep apnea, study scheduled    Stage 3b chronic kidney disease ([address] 09/12/2021    Dr Sanchez Roa, nephrology     Allergies   Allergen Reactions    Amlodipine Swelling     Had swelling in his legs and feet - states was painful to walk       Current Outpatient Medications   Medication Sig    metroNIDAZOLE (FLAGYL) 500 mg tablet Take 1 Tablet by mouth two (2) times a day for 14 days.  clarithromycin (BIAXIN) 500 mg tablet Take 1 Tablet by mouth two (2) times a day for 14 days.  omeprazole (PRILOSEC) 20 mg capsule Take 1 Capsule by mouth Before breakfast and dinner for 14 days.  ezetimibe (ZETIA) 10 mg tablet Take 1 tablet by mouth once daily    Jardiance 25 mg tablet Take 1 tablet by mouth once daily    atorvastatin (LIPITOR) 40 mg tablet Take 1 tablet by mouth once daily    TRUEplus Lancets 33 gauge misc USE TO CHECK BLOOD SUGAR TWICE DAILY    hydrALAZINE (APRESOLINE) 25 mg tablet Take 1 Tablet by mouth three (3) times daily.  carvediloL (Coreg) 25 mg tablet Take 1 Tablet by mouth two (2) times daily (with meals).  fluticasone propionate (FLONASE) 50 mcg/actuation nasal spray Use 2 spray(s) in each nostril once daily    Blood-Glucose Meter (True Metrix Glucose Meter) misc Check glucose twice daily. DX S95.19M5    glucose blood VI test strips (blood glucose test) strip Check glucose twice daily. DX R20.26F2    Trulicity 1.78 CLAIRE/8.8 mL sub-q pen INJECT 1 PEN SUBCUTANEOUSLY ONCE A WEEK    lancets misc USE TO CHECK BLOOD SUGAR TWICE DAILY    losartan (COZAAR) 100 mg tablet Take 1 tablet by mouth once daily    Blood-Glucose Meter monitoring kit Use to check blood sugar 2 times per day. No current facility-administered medications for this visit.        Lab Results   Component Value Date/Time    Sodium 138 09/03/2021 12:00 AM    Potassium 4.5 09/03/2021 12:00 AM    Chloride 102 09/03/2021 12:00 AM    CO2 16 (L) 09/03/2021 12:00 AM    Anion gap 5 03/31/2021 07:04 PM    Glucose 151 (H) 09/03/2021 12:00 AM    BUN 50 (H) 09/03/2021 12:00 AM    Creatinine 2.35 (H) 09/03/2021 12:00 AM    BUN/Creatinine ratio 21 (H) 09/03/2021 12:00 AM    GFR est AA 39 (L) 09/03/2021 12:00 AM    GFR est non-AA 34 (L) 09/03/2021 12:00 AM    Calcium 9.2 09/03/2021 12:00 AM    Bilirubin, total 0.4 03/31/2021 05:22 PM    Alk. phosphatase 88 03/31/2021 05:22 PM    Protein, total 8.2 03/31/2021 05:22 PM    Albumin 3.4 03/31/2021 05:22 PM    Globulin 4.8 (H) 03/31/2021 05:22 PM    A-G Ratio 0.7 (L) 03/31/2021 05:22 PM    ALT (SGPT) 29 03/31/2021 05:22 PM       Lab Results   Component Value Date/Time    Cholesterol, total 183 03/23/2021 10:13 AM    HDL Cholesterol 36 (L) 03/23/2021 10:13 AM    LDL, calculated 111 (H) 03/23/2021 10:13 AM    LDL, calculated 156 (H) 07/24/2020 09:46 AM    VLDL, calculated 36 03/23/2021 10:13 AM    VLDL, calculated 69 (H) 07/24/2020 09:46 AM    Triglyceride 206 (H) 03/23/2021 10:13 AM       Lab Results   Component Value Date/Time    WBC 10.0 09/03/2021 12:00 AM    HGB 12.3 (L) 09/03/2021 12:00 AM    HCT 37.1 (L) 09/03/2021 12:00 AM    PLATELET 295 73/08/5157 12:00 AM    MCV 82 09/03/2021 12:00 AM       Lab Results   Component Value Date/Time    Microalb/Creat ratio (ug/mg creat.) 2,460 (H) 08/09/2021 12:00 AM       HbA1c:  Lab Results   Component Value Date/Time    Hemoglobin A1c 9.4 (H) 08/02/2021 09:16 AM     No components found for: 2     Last Point of Care HGB A1C  No results found for: [id number]     Estimated Creatinine Clearance: 57.9 mL/min (A) (by C-G formula based on SCr of 2.35 mg/dL (H)). Medication reconciliation was completed during the visit. There are no discontinued medications. Patient verbalized understanding of the information presented and all of the patients questions were answered. AVS was handed to the patient. Patient advised to call the office with any additional questions or concerns. Thank you,  Yonas Jernigan.  [facility] Tracking Only     CPA in place:  Yes   Recommendation Provided To: Patient/Caregiver: 0 via In person   Time Spent (min): 45

## 2021-11-04 ENCOUNTER — HOSPITAL ENCOUNTER (OUTPATIENT)
Dept: SLEEP MEDICINE | Age: 36
Discharge: HOME | End: 2021-11-04
Payer: MEDICAID

## 2021-11-04 DIAGNOSIS — R06.2 WHEEZING: ICD-10-CM

## 2021-11-04 DIAGNOSIS — Z87.891 EX-SMOKER FOR MORE THAN 1 YEAR: ICD-10-CM

## 2021-11-04 DIAGNOSIS — Z23 NEEDS FLU SHOT: ICD-10-CM

## 2021-11-04 DIAGNOSIS — E11.8 CONTROLLED DIABETES MELLITUS TYPE 2 WITH COMPLICATIONS, UNSPECIFIED WHETHER LONG TERM INSULIN USE (HCC): ICD-10-CM

## 2021-11-04 DIAGNOSIS — E78.5 HYPERLIPIDEMIA LDL GOAL <70: ICD-10-CM

## 2021-11-04 DIAGNOSIS — E66.01 MORBID OBESITY WITH BMI OF 45.0-49.9, ADULT (HCC): ICD-10-CM

## 2021-11-04 DIAGNOSIS — R06.83 SNORING: ICD-10-CM

## 2021-11-04 DIAGNOSIS — I10 HYPERTENSION, UNSPECIFIED TYPE: ICD-10-CM

## 2021-11-04 LAB
BUN SERPL-MCNC: 43 MG/DL (ref 6–20)
BUN/CREAT SERPL: 22 (ref 9–20)
CHLORIDE SERPL-SCNC: 106 MMOL/L (ref 96–106)
CO2 SERPL-SCNC: 20 MMOL/L (ref 20–29)
CREAT SERPL-MCNC: 1.97 MG/DL (ref 0.76–1.27)
GLUCOSE SERPL-MCNC: 105 MG/DL (ref 65–99)
INTERPRETATION: NORMAL
POTASSIUM SERPL-SCNC: 5.1 MMOL/L (ref 3.5–5.2)
SODIUM SERPL-SCNC: 139 MMOL/L (ref 134–144)
SPECIMEN STATUS REPORT, ROLRST: NORMAL

## 2021-11-04 PROCEDURE — 95806 SLEEP STUDY UNATT&RESP EFFT: CPT

## 2021-11-04 NOTE — PROGRESS NOTES
NUTRITION - FOLLOW-UP TREATMENT NOTE  Patient Name: Trudy Del Castillo [address] [place] [address]         Date: 2021  : 1985    YES/NO Patient  Verified  Diagnosis: HTN, Kidney disease, Type 2 diabetes. Total Treatment Time (min):   30     SUBJECTIVE/ASSESSMENT:  Patient is doing well. He has made many changes. He continues to decrease his carbohydrates and he is more active. Patient is pursuing surgical weight loss. Changes in medication or medical history? Any new allergies, surgeries or procedures? YES/NO    If yes, update Summary List   None reported        Current Wt: 307# Previous Wt: 300# Wt Change: 7#     Achievement of Goals: 1. Started to lift weights. 2.  Decreased carbohydrate intake. 3.  Hgb A1c has dropped from 9.4 to 6.3. Patient Education:  [x]  Review current plan with patient   []  Other:    Handouts/  Information Provided: []  Carbohydrates  []  Protein  []  Fiber  []  Serving Sizes  []  Fluids  []  General guidelines []  Diabetes  []  Cholesterol  []  Sodium  []  SBGM  []  Food Journals  []  Others:      New Patient Goals: 1. Increase cardio workout.      PLAN    [x]  Continue on current plan []  Follow-up PRN   []  Discharge due to :    [x]  Next appt: 2021  At 10:30 am     Dietitian: Vicki Lucio RD    Date: 2021

## 2021-11-05 ENCOUNTER — HOSPITAL ENCOUNTER (OUTPATIENT)
Dept: SLEEP MEDICINE | Age: 36
Discharge: HOME | End: 2021-11-05
Payer: MEDICAID

## 2021-11-08 DIAGNOSIS — G47.34 NOCTURNAL HYPOXIA: ICD-10-CM

## 2021-11-08 DIAGNOSIS — G47.33 OSA (OBSTRUCTIVE SLEEP APNEA): Primary | ICD-10-CM

## 2021-11-08 NOTE — PROGRESS NOTES
Rodriguez Dell presents today for   Chief Complaint   Patient presents with    Follow-up    Labs     11-1-21                1. \"Have you been to the ER, urgent care clinic since your last visit? Hospitalized since your last visit? \" no    2. \"Have you seen or consulted any other health care providers outside of the [address] [place] [address] since your last visit? \" yes

## 2021-11-09 ENCOUNTER — OFFICE VISIT (OUTPATIENT)
Dept: INTERNAL MEDICINE CLINIC | Age: 36
End: 2021-11-09
Payer: MEDICAID

## 2021-11-09 VITALS
SYSTOLIC BLOOD PRESSURE: 135 MMHG | OXYGEN SATURATION: 97 % | WEIGHT: 310 LBS | BODY MASS INDEX: 48.65 KG/M2 | DIASTOLIC BLOOD PRESSURE: 75 MMHG | RESPIRATION RATE: 16 BRPM | TEMPERATURE: 98.2 F | HEIGHT: 67 IN | HEART RATE: 85 BPM

## 2021-11-09 DIAGNOSIS — I10 ESSENTIAL HYPERTENSION: ICD-10-CM

## 2021-11-09 DIAGNOSIS — E11.37X9 CONTROLLED TYPE 2 DIABETES MELLITUS WITH DIABETIC MACULAR EDEMA RESOLVED AFTER TREATMENT, WITHOUT LONG-TERM CURRENT USE OF INSULIN, UNSPECIFIED LATERALITY (HCC): Primary | ICD-10-CM

## 2021-11-09 DIAGNOSIS — N18.32 STAGE 3B CHRONIC KIDNEY DISEASE (HCC): ICD-10-CM

## 2021-11-09 PROCEDURE — 99214 OFFICE O/P EST MOD 30 MIN: CPT | Performed by: INTERNAL MEDICINE

## 2021-11-09 RX ORDER — HYDRALAZINE HYDROCHLORIDE 50 MG/1
50 TABLET, FILM COATED ORAL 3 TIMES DAILY
COMMUNITY
Start: 2021-11-04

## 2021-11-09 NOTE — PROGRESS NOTES
[facility]:  11/9/2021, MRN: [id number]      Flavia Diez is a 39 y.o. male and presents to clinic for Follow-up and Labs (11-1-21)    Subjective: The patient is a 79-year-old male with history of hypertension, type 2 diabetes, lumbar facet arthropathy, obesity, left transmetatarsal amputation secondary to a diabetic foot infection, and HLD. 1. Type 2 DM and CKD: The patient was well controlled up until earlier this year at which time his A1c climbed to the 9 range. At that time, he was noted to have an elevated creatinine in the 2 range. This was above his baseline creatinine. Since then, we have been working aggressively to control his blood sugar. Today he reports: His blood sugars have been better. No hypoglycemic episodes. He is taking Trulicity and Jardiance. His most recent labs show his creatinine is 1.97. BUN is 43. His A1c is 6.3. He is avoiding carbohydrates. 2.  Hypertension: His blood pressure was still elevated at his last appointment. As result, I increased his carvedilol to 25 mg twice daily. I also added hydralazine. He is to continue taking carvedilol, losartan, and Lasix. He is followed by a nephrologist given his CKD. Today his blood pressure is: 135/75. No adverse side effects since starting hydralazine.         Patient Active Problem List    Diagnosis Date Noted    Hypertension     Diabetes ([address]     Morbid obesity ([address]     Morbid obesity with BMI of 45.0-49.9, adult ([address]     Diabetes mellitus ([address]     Hx of diabetic foot ulcer     HI (acute kidney injury) ([address]     Acid reflux     Stage 3b chronic kidney disease ([address] 09/12/2021    Erectile dysfunction 01/05/2020    Snoring 01/05/2020    Anemia 01/05/2020    Hyperlipidemia LDL goal <70 12/14/2018    Cataract of both eyes 10/08/2018    Hypertensive retinopathy of both eyes 10/08/2018    Essential hypertension 09/06/2018    Controlled type 2 diabetes mellitus, without long-term current use of insulin ([facility] 06/19/2018    Proliferative diabetic retinopathy of both eyes ([facility] 06/19/2018    Obesity, morbid, BMI 40.0-49.9 ([facility] 05/20/2018    Lumbar facet arthropathy 05/20/2018       Current Outpatient Medications   Medication Sig Dispense Refill    metroNIDAZOLE (FLAGYL) 500 mg tablet Take 1 Tablet by mouth two (2) times a day for 14 days. 28 Tablet 0    clarithromycin (BIAXIN) 500 mg tablet Take 1 Tablet by mouth two (2) times a day for 14 days. 28 Tablet 0    omeprazole (PRILOSEC) 20 mg capsule Take 1 Capsule by mouth Before breakfast and dinner for 14 days. 28 Capsule 0    ezetimibe (ZETIA) 10 mg tablet Take 1 tablet by mouth once daily 90 Tablet 3    Jardiance 25 mg tablet Take 1 tablet by mouth once daily 30 Tablet 3    atorvastatin (LIPITOR) 40 mg tablet Take 1 tablet by mouth once daily 90 Tablet 3    TRUEplus Lancets 33 gauge misc USE TO CHECK BLOOD SUGAR TWICE DAILY      hydrALAZINE (APRESOLINE) 25 mg tablet Take 1 Tablet by mouth three (3) times daily. 90 Tablet 5    carvediloL (Coreg) 25 mg tablet Take 1 Tablet by mouth two (2) times daily (with meals). 60 Tablet 5    fluticasone propionate (FLONASE) 50 mcg/actuation nasal spray Use 2 spray(s) in each nostril once daily 16 g 5    Blood-Glucose Meter (True Metrix Glucose Meter) misc Check glucose twice daily. DX P97.74S5 50 Each 5    glucose blood VI test strips (blood glucose test) strip Check glucose twice daily. DX E11.37X9 020 Strip 5    Trulicity 6.11 CQ/8.9 mL sub-q pen INJECT 1 PEN SUBCUTANEOUSLY ONCE A WEEK 12 mL 5    lancets misc USE TO CHECK BLOOD SUGAR TWICE DAILY 100 Each 5    losartan (COZAAR) 100 mg tablet Take 1 tablet by mouth once daily 90 Tablet 1    Blood-Glucose Meter monitoring kit Use to check blood sugar 2 times per day.  1 Kit 0       Allergies   Allergen Reactions    Amlodipine Swelling     Had swelling in his legs and feet - states was painful to walk       Past Medical History:   Diagnosis Date    Acid reflux     avoids food triggers    HI (acute kidney injury) ([facility] 04/2021    Anemia 1/5/2020    Cataract of both eyes 10/8/2018    Diabetes ([facility]     Diabetes mellitus ([facility]     Dx'd 2018, HgA1c 9.4% 3/2021    Hx of diabetic foot ulcer     7/2021    Hyperlipidemia LDL goal <70 12/14/2018    Hypertension     Dx'd 2018    Morbid obesity ([facility]     Morbid obesity with BMI of 45.0-49.9, adult ([facility]     Proliferative diabetic retinopathy of both eyes ([facility] 6/19/2018    Snoring     consult Dr Charlynne Denver 10/21 for sleep apnea, study scheduled    Stage 3b chronic kidney disease ([facility] 09/12/2021    Dr Georgiana Prceiado, nephrology       Past Surgical History:   Procedure Laterality Date    HX AMPUTATION TOE  2018       Family History   Problem Relation Age of Onset    Kidney Disease Mother         ESRD - HD       Social History     Tobacco Use    Smoking status: Never Smoker    Smokeless tobacco: Never Used   Substance Use Topics    Alcohol use: No       ROS   Review of Systems   Constitutional: Negative for chills and fever. HENT: Negative for ear pain and sore throat. Eyes: Negative for blurred vision and pain. Respiratory: Negative for cough and shortness of breath. Cardiovascular: Negative for chest pain. Gastrointestinal: Negative for abdominal pain, blood in stool and melena. Genitourinary: Negative for dysuria and hematuria. Musculoskeletal: Negative for joint pain and myalgias. Skin: Negative for rash. Neurological: Negative for headaches. Endo/Heme/Allergies: Does not bruise/bleed easily. Psychiatric/Behavioral: Negative for substance abuse. Objective     There were no vitals filed for this visit. Physical Exam  Vitals and nursing note reviewed. Constitutional:       Appearance: Normal appearance. HENT:      Head: Normocephalic and atraumatic.       Right Ear: External ear normal.      Left Ear: External ear normal.   Eyes:      Conjunctiva/sclera: Conjunctivae normal.   Cardiovascular:      Rate and Rhythm: Normal rate and regular rhythm. Pulses: Normal pulses. Heart sounds: Normal heart sounds. Pulmonary:      Effort: Pulmonary effort is normal.      Breath sounds: Normal breath sounds. Abdominal:      General: Abdomen is flat. Bowel sounds are normal. There is no distension. Palpations: Abdomen is soft. Tenderness: There is no abdominal tenderness. Musculoskeletal:         General: No swelling (BUE) or tenderness (BUE). Cervical back: Neck supple. Lymphadenopathy:      Cervical: No cervical adenopathy. Skin:     General: Skin is warm and dry. Neurological:      Mental Status: He is alert. Mental status is at baseline.       Gait: Gait normal.   Psychiatric:         Mood and Affect: Mood normal.         LABS   Data Review:   Lab Results   Component Value Date/Time    WBC 10.0 09/03/2021 12:00 AM    HGB 12.3 (L) 09/03/2021 12:00 AM    HCT 37.1 (L) 09/03/2021 12:00 AM    PLATELET 823 71/88/8380 12:00 AM    MCV 82 09/03/2021 12:00 AM       Lab Results   Component Value Date/Time    Sodium 139 11/01/2021 12:00 AM    Potassium 5.1 11/01/2021 12:00 AM    Chloride 106 11/01/2021 12:00 AM    CO2 20 11/01/2021 12:00 AM    Anion gap 5 03/31/2021 07:04 PM    Glucose 105 (H) 11/01/2021 12:00 AM    BUN 43 (H) 11/01/2021 12:00 AM    Creatinine 1.97 (H) 11/01/2021 12:00 AM    BUN/Creatinine ratio 22 (H) 11/01/2021 12:00 AM    GFR est AA 49 (L) 11/01/2021 12:00 AM    GFR est non-AA 42 (L) 11/01/2021 12:00 AM    Calcium 9.2 09/03/2021 12:00 AM       Lab Results   Component Value Date/Time    Cholesterol, total 183 03/23/2021 10:13 AM    HDL Cholesterol 36 (L) 03/23/2021 10:13 AM    LDL, calculated 111 (H) 03/23/2021 10:13 AM    LDL, calculated 156 (H) 07/24/2020 09:46 AM    VLDL, calculated 36 03/23/2021 10:13 AM    VLDL, calculated 69 (H) 07/24/2020 09:46 AM    Triglyceride 206 (H) 03/23/2021 10:13 AM       Lab Results   Component Value Date/Time Hemoglobin A1c 6.3 (H) 11/01/2021 12:00 AM       Assessment/Plan:   1. Hypertension: At goal.  Continue medication as prescribed. We will check labs in 4 months. I will follow with him in the office for a BP check at that time. 2.  Type 2 diabetes: A1c is controlled. Creatinine has improved. +CKD and mild anemia per labs earlier this yr. We will check labs in 4 months. Continue medication as prescribed. I encouraged him to maintain a low-carb renal diet. Health Maintenance Due   Topic Date Due    Hepatitis C Screening  Never done     Lab review: labs are reviewed in the EHR and ordered as mentioned above    I have discussed the diagnosis with the patient and the intended plan as seen in the above orders. The patient has received an after-visit summary and questions were answered concerning future plans. I have discussed medication side effects and warnings with the patient as well. I have reviewed the plan of care with the patient, accepted their input and they are in agreement with the treatment goals. All questions were answered. The patient understands the plan of care. Handouts provided today with above information. Pt instructed if symptoms worsen to call the office or report to the ED for continued care. Greater than 50% of the visit time was spent in counseling and/or coordination of care. Voice recognition was used to generate this report, which may have resulted in some phonetic based errors in grammar and contents. Even though attempts were made to correct all the mistakes, some may have been missed, and remained in the body of the document.           Matthew Samaniego MD

## 2021-11-09 NOTE — PATIENT INSTRUCTIONS
Learning About Tests When You Have Diabetes  Why do you need regular tests? Diabetes can lead to other health problems if it's not well controlled. You'll need tests to monitor how well your diabetes is controlled and to check for other things like high cholesterol or kidney problems. Having tests on a regular schedule can help your doctor find problems early, when it's easier to manage them. What tests do you need? These are the tests you may need and how often you should have them. A1c blood test.   This test shows the average level of blood sugar over the past 2 to 3 months. It helps your doctor see whether blood sugar levels have been staying within your target range. · How often: Every 3 to 6 months  · Goal: A blood sugar level in your target range  Blood pressure test.   This test measures the pressure of blood flow in the arteries. Controlling blood pressure can help prevent damage to nerves and blood vessels. · How often: Every 3 to 6 months  · Goal: A blood pressure level in your target range  Cholesterol test.   This test measures the amount of a type of fat in the blood. It is common for people with diabetes to also have high cholesterol. Too much cholesterol in the blood can build up inside the blood vessels and raise the risk for heart attack and stroke. · How often: At the time of your diabetes diagnosis, and as often as your doctor recommends after that  · Goal: A cholesterol level in your target range  Albumin-creatinine ratio test.   This test checks for kidney damage by looking for the protein albumin (say \"al-BYOO-roscoe\") in the urine. Albumin is normally found in the blood. Kidney damage can let small amounts of it (microalbumin) leak into the urine. · How often: Once a year  · Goal: No protein in the urine  Blood creatinine test/estimated glomerular filtration (eGFR). The blood creatinine (say \"cghf-ON-he-neen\") level shows how well your kidneys are working.  Creatinine is a waste product that muscles release into the blood. Blood creatinine is used to estimate the glomerular filtration rate. A high level of creatinine and/or a low eGFR may mean your kidneys are not working as well as they should. · How often: Once a year  · Goal: Normal level of creatinine in the blood. The eGFR goal is greater than 60 mL/min/1.73 m². Complete foot exam.   The doctor checks for foot sores and whether any sensation has been lost.  · How often: Once a year  · Goal: Healthy feet with no foot ulcers or loss of feeling  Dental exam and cleaning. The dentist checks for gum disease and tooth decay. People with high blood sugar are more likely to have these problems. · How often: Every 6 months  · Goal: Healthy teeth and gums  Complete eye exam.   High blood sugar levels can damage the eyes. This exam is done by an ophthalmologist or optometrist. It includes a dilated eye exam. The exam shows whether there's damage to the back of the eye (diabetic retinopathy). · How often: Once a year. If you don't have any signs of diabetic retinopathy, your doctor may recommend an exam every 2 years. · Goal: No damage to the back of the eye  Thyroid-stimulating hormone (TSH) blood test.   This test checks for thyroid disease. Too little thyroid hormone can cause some medicines (like insulin) to stay in the body longer. This can cause low blood sugar. You may be tested if you have high cholesterol or are a woman over 48years old. · How often: As part of your diabetes diagnosis, and as often as your doctor recommends after that  · Goal: Normal level of TSH in the blood  Follow-up care is a key part of your treatment and safety. Be sure to make and go to all appointments, and call your doctor if you are having problems. It's also a good idea to know your test results and keep a list of the medicines you take. Where can you learn more?   Go to http://www.[employer].com/  Enter A243 in the search box to learn more about \"Learning About Tests When You Have Diabetes. \"  Current as of: August 31, 2020               Content Version: 13.0  © 2006-2021 Healthwise, [employer]. Care instructions adapted under license by [employer] (which disclaims liability or warranty for this information). If you have questions about a medical condition or this instruction, always ask your healthcare professional. [address] any warranty or liability for your use of this information. Learning About Meal Planning for Diabetes  Why plan your meals? Meal planning can be a key part of managing diabetes. Planning meals and snacks with the right balance of carbohydrate, protein, and fat can help you keep your blood sugar at the target level you set with your doctor. You don't have to eat special foods. You can eat what your family eats, including sweets once in a while. But you do have to pay attention to how often you eat and how much you eat of certain foods. You may want to work with a dietitian or a certified diabetes educator. He or she can give you tips and meal ideas and can answer your questions about meal planning. This health professional can also help you reach a healthy weight if that is one of your goals. What plan is right for you? Your dietitian or diabetes educator may suggest that you start with the plate format or carbohydrate counting. The plate format  The plate format is a simple way to help you manage how you eat. You plan meals by learning how much space each food should take on a plate. Using the plate format helps you spread carbohydrate throughout the day. It can make it easier to keep your blood sugar level within your target range. It also helps you see if you're eating healthy portion sizes. To use the plate format, you put non-starchy vegetables on half your plate. Add meat or meat substitutes on one-quarter of the plate.  Put a grain or starchy vegetable (such as brown rice or a potato) on the final quarter of the plate. You can add a small piece of fruit and some low-fat or fat-free milk or yogurt, depending on your carbohydrate goal for each meal.  Here are some tips for using the plate format:  · Make sure that you are not using an oversized plate. A 9-inch plate is best. Many restaurants use larger plates. · Get used to using the plate format at home. Then you can use it when you eat out. · Write down your questions about using the plate format. Talk to your doctor, a dietitian, or a diabetes educator about your concerns. Carbohydrate counting  With carbohydrate counting, you plan meals based on the amount of carbohydrate in each food. Carbohydrate raises blood sugar higher and more quickly than any other nutrient. It is found in desserts, breads and cereals, and fruit. It's also found in starchy vegetables such as potatoes and corn, grains such as rice and pasta, and milk and yogurt. Spreading carbohydrate throughout the day helps keep your blood sugar levels within your target range. Your daily amount depends on several things, including your weight, how active you are, which diabetes medicines you take, and what your goals are for your blood sugar levels. A registered dietitian or diabetes educator can help you plan how much carbohydrate to include in each meal and snack. A guideline for your daily amount of carbohydrate is:  · 45 to 60 grams at each meal. That's about the same as 3 to 4 carbohydrate servings. · 15 to 20 grams at each snack. That's about the same as 1 carbohydrate serving. The Nutrition Facts label on packaged foods tells you how much carbohydrate is in a serving of the food. First, look at the serving size on the food label. Is that the amount you eat in a serving? All of the nutrition information on a food label is based on that serving size. So if you eat more or less than that, you'll need to adjust the other numbers.  Total carbohydrate is the next thing you need to look for on the label. If you count carbohydrate servings, one serving of carbohydrate is 15 grams. For foods that don't come with labels, such as fresh fruits and vegetables, you'll need a guide that lists carbohydrate in these foods. Ask your doctor, dietitian, or diabetes educator about books or other nutrition guides you can use. If you take insulin, you need to know how many grams of carbohydrate are in a meal. This lets you know how much rapid-acting insulin to take before you eat. If you use an insulin pump, you get a constant rate of insulin during the day. So the pump must be programmed at meals to give you extra insulin to cover the rise in blood sugar after meals. When you know how much carbohydrate you will eat, you can take the right amount of insulin. Or, if you always use the same amount of insulin, you need to make sure that you eat the same amount of carbohydrate at meals. If you need more help to understand carbohydrate counting and food labels, ask your doctor, dietitian, or diabetes educator. How can you plan healthy meals? Here are some tips to get started:  · Plan your meals a week at a time. Don't forget to include snacks too. · Use cookbooks or online recipes to plan several main meals. Plan some quick meals for busy nights. You also can double some recipes that freeze well. Then you can save half for other busy nights when you don't have time to cook. · Make sure you have the ingredients you need for your recipes. If you're running low on basic items, put these items on your shopping list too. · List foods that you use to make breakfasts, lunches, and snacks. List plenty of fruits and vegetables. · Post this list on the refrigerator. Add to it as you think of more things you need. · Take the list to the store to do your weekly shopping. Follow-up care is a key part of your treatment and safety.  Be sure to make and go to all appointments, and call your doctor if you are having problems. It's also a good idea to know your test results and keep a list of the medicines you take. Where can you learn more? Go to http://www.gray.com/  Enter K626 in the search box to learn more about \"Learning About Meal Planning for Diabetes. \"  Current as of: December 17, 2020               Content Version: 13.0  © 2006-2021 Healthwise[employer]. Care instructions adapted under license by [employer] (which disclaims liability or warranty for this information). If you have questions about a medical condition or this instruction, always ask your healthcare professional. [address] any warranty or liability for your use of this information.

## 2021-11-15 ENCOUNTER — TELEPHONE (OUTPATIENT)
Dept: PULMONOLOGY | Age: 36
End: 2021-11-15

## 2021-11-15 NOTE — TELEPHONE ENCOUNTER
Pt had home sleep study done about two weeks ago and would like to know the results.  Please call [phone]

## 2021-11-16 NOTE — TELEPHONE ENCOUNTER
Pt calling back wanting sleep study results. Pt made aware Dr. Severiano Creed not in office, will check with her and call back at [phone].

## 2021-11-24 ENCOUNTER — OFFICE VISIT (OUTPATIENT)
Dept: SURGERY | Age: 36
End: 2021-11-24

## 2021-11-24 VITALS — WEIGHT: 303.1 LBS | HEIGHT: 66 IN | BODY MASS INDEX: 48.71 KG/M2

## 2021-11-24 DIAGNOSIS — E66.01 MORBID OBESITY (HCC): Primary | ICD-10-CM

## 2021-11-26 NOTE — PROGRESS NOTES
[facility]  Multidisciplinary Weight Loss    Name: Bipin Brown [facility]   : 1985    Session# 1  Date: 2021    Visit Vitals  Ht 5' 6\" (1.676 m)   Wt 137.5 kg (303 lb 1.6 oz)   BMI 48.92 kg/m²       Pt has LOST 6.2 lbs since initial consult on 10/29/21. Dietary Instructions    Pt attended 90 min in-person class. Topics reviewed: Behavior modification techniques, pre-op diet key principles (Including - Understanding low carbohydrates, low sugar, higher protein meals, no meal skipping, protein sources, carbohydrate sources, 64 ounces of non-caloric fluid, etc.). Encouraged pt follow a low carbohydrate, high protein, 1200 kcal diet with no liquids at meal table (for preparation of post-op 30:30 rule recommendation), high protein, 64+ oz no sugar, no caffeine, no carbonation fluids per day. Educated pt on the importance of eating 3 meals/day at regularly scheduled times including breakfast within 1st 1-2 hours of waking. Reviewed working toward 4-6 smaller meals per day, to assist with optimizing protein intake. Suggested patient uses a protein supplement as a meal replacement instead of skipping OR as a between meal high protein snack. Also educated on the importance of including a protein with every meal and snack and reviewed lean sources. Lastly introduced the Plate Method for Meal Planning and reviewed/discussed the importance of limiting and reducing daily carbohydrate intake to 75-100g/day now with an ultimate goal of 30-50g/day pre-op and a very low carbohydrate content post-op. Discussed label reading, tips for measuring carbohydrates, and low carbohydrate- high protein meal and snack ideas. Discussed perceived compliance through recall of knowledge in class.     Comments:  Recommended dietary changes discussed for both before and after surgery Recommendation to follow 1200 calorie diet, working to reduce total carbohydrate intake to  g or less per day and increasing protein intake to  g per day, with no liquids at meal table (for preparation of post-op 30:30 rule recommendation), and 64+ oz  no sugar, no caffeine, no carbonation fluids per day. Recommend pt adopt an exercise routine of at least 3-5 days per week for at least 30 minutes/day. If pt unable to participate in walking, arnaud, swimming, or other exercises, recommend pt work with a physical therapist and/or participate in chair exercises, yoga, and/or increase activities of daily living as able. Discussed importance of sampling protein supplements, protein supplement label guidelines and popularly selected items. Handouts provided:    Bariatric Surgery Criteria Packet  [place] [employer] Vit[employer]   Post-Op Education Packet   Meal Guide packet   BD Starches guide   Nuts for Nuts? Be Careful!    Protein chart   Protein Content of Foods   \"Carb Counting\"   \"Snack Suggestions\"    Behavior Modification    Identify obstacles to trigger change  Achieving/Rewarding goals met  Positive attitude  Comments: Reinforced importance of modifying lifestyle patterns and behaviors to promote weight loss and long-term weight maintenance. I talked to patient about the importance of taking vitamins post op and we reviewed the vitamins that patients will be taking post op. Encouraged pt to begin taking multivitamin and probiotic pre-op. Pt completed Bariatric-specific nutrition questionnaire. RD reviewed answers and provided feedback on responses that align with bariatric recommendations to behavior changes. Provided feedback on recommendations, areas for improvement, and provided positive feedback on areas where pt is making positive behavior changes. Pt questionnaire is included in chart separate from this note. All current stated pt questions answered. Goals:   1. Work to increase to 3-4 small meals per day, with planned snacks as needed.   Recommend following My Bariatric Plate method for meal planning - focusing on lean protein, non-starchy vegetables, and measured amounts of starch, or 50% protein / 50% non-starchy vegetables by surgery. - Goal of  g protein and  g carbohydrate per day. - Recommend continuing protein supplement as meal replacement at least 1x/day OR as high protein snack option  2. Increase non caloric fluid to 64 oz per day. Eliminate caffeine, added sugar, carbonation, and straws.               -Continue to work to decrease sugar sweetened beverages - goal of calorie free beverages only              -Must eliminate caffeine prior to surgery and avoid for ~6-8 weeks   -Practice 30:30 rule,  food and flood   3. Start activity regimen, work to increase ADL  4. Start Complete MVI and probiotic. Candidate for surgery (per RD): YES - Pt has met insurance requirements for pre-operatory nutrition education. Pt acknowledges understanding that bariatric surgery requires lifelong adherence to dietary and exercise behavior change recommendations in order to be successful with weight loss and weight-loss maintenance. Pt demonstrates understanding of post-op key diet principles and recommendations through class discussion and recall  . Pt does not have any questions/concerns at this time. RD contact information provided for future nutrition questions or concerns. [x] Pt requested and e-mail sent to pt with support group information for monthly Zoom class.     HUEY Nettles MS

## 2021-12-08 ENCOUNTER — APPOINTMENT (OUTPATIENT)
Dept: GENERAL RADIOLOGY | Age: 36
End: 2021-12-08
Attending: SPECIALIST
Payer: MEDICAID

## 2021-12-08 ENCOUNTER — APPOINTMENT (OUTPATIENT)
Dept: SURGERY | Age: 36
End: 2021-12-08

## 2021-12-08 ENCOUNTER — HOSPITAL ENCOUNTER (OUTPATIENT)
Age: 36
Setting detail: OUTPATIENT SURGERY
Discharge: HOME | End: 2021-12-08
Attending: SPECIALIST | Admitting: SPECIALIST
Payer: MEDICAID

## 2021-12-08 VITALS
WEIGHT: 301.1 LBS | BODY MASS INDEX: 48.39 KG/M2 | OXYGEN SATURATION: 98 % | HEIGHT: 66 IN | TEMPERATURE: 96.9 F | RESPIRATION RATE: 20 BRPM | HEART RATE: 80 BPM | SYSTOLIC BLOOD PRESSURE: 144 MMHG | DIASTOLIC BLOOD PRESSURE: 86 MMHG

## 2021-12-08 DIAGNOSIS — E66.01 MORBID OBESITY (HCC): ICD-10-CM

## 2021-12-08 DIAGNOSIS — K21.9 GASTROESOPHAGEAL REFLUX DISEASE, UNSPECIFIED WHETHER ESOPHAGITIS PRESENT: ICD-10-CM

## 2021-12-08 PROCEDURE — 74240 X-RAY XM UPR GI TRC 1CNTRST: CPT | Performed by: SPECIALIST

## 2021-12-08 PROCEDURE — 74240 X-RAY XM UPR GI TRC 1CNTRST: CPT

## 2021-12-08 PROCEDURE — 74011000250 HC RX REV CODE- 250: Performed by: SPECIALIST

## 2021-12-08 PROCEDURE — 76040000019: Performed by: SPECIALIST

## 2021-12-08 NOTE — PROCEDURES
Patient:Arie Marshall   : 1985  Medical Record Number:[id number]            PREPROCEDURE DIAGNOSIS: This patient is preoperative for laparoscopic sleeve gastrectomy procedure with a history of  reflux disease. POSTPROCEDURE DIAGNOSIS: This patient is preoperative for laparoscopic sleeve gastrectomy procedure with a history of  reflux disease. PROCEDURES PERFORMED: Upper GI study with barium. ESTIMATED BLOOD LOSS: None. SPECIMENS: None. STATEMENT OF MEDICAL NECESSITY: The patient is a patient with a  longstanding history of obesity. They are now considering the laparoscopic sleeve gastrectomy procedure as a means of surgical weight control and due to their history of reflux disease and are being assessed preoperatively for such. DESCRIPTION OF PROCEDURE: The patient was brought to the fluoroscopy unit and  was given thin barium. On swallowing of barium, they were noted to have  normal peristalsis of their esophagus. They had prompt filling of distal  esophagus with tapering into the gastroesophageal junction. There was no evidence of a hiatal hernia present. Contrast then filled the gastric cardia, fundus,body and pre pyloric region with no abnormalities noted. Contrast then exited the pylorus in normal fashion. No obstruction was noted. There was no evidence of reflux noted.     (normal anatomy)    Janelle Mantilla MD

## 2021-12-13 NOTE — TELEPHONE ENCOUNTER
Pt calling back again for results of sleep study. Stated that no one has called him back from a month ago when he called. Pt still waiting on results of home sleep study.  Please advise [phone]

## 2021-12-30 ENCOUNTER — TRANSCRIBE ORDER (OUTPATIENT)
Dept: SCHEDULING | Age: 36
End: 2021-12-30

## 2021-12-30 DIAGNOSIS — N18.31 CHRONIC KIDNEY DISEASE, STAGE 3A (HCC): Primary | ICD-10-CM

## 2022-01-05 ENCOUNTER — OFFICE VISIT (OUTPATIENT)
Dept: SURGERY | Age: 37
End: 2022-01-05
Payer: MEDICAID

## 2022-01-05 VITALS
HEIGHT: 66 IN | RESPIRATION RATE: 18 BRPM | OXYGEN SATURATION: 100 % | SYSTOLIC BLOOD PRESSURE: 170 MMHG | BODY MASS INDEX: 48.78 KG/M2 | DIASTOLIC BLOOD PRESSURE: 88 MMHG | HEART RATE: 75 BPM | WEIGHT: 303.5 LBS

## 2022-01-05 DIAGNOSIS — E78.5 HYPERLIPIDEMIA LDL GOAL <70: ICD-10-CM

## 2022-01-05 DIAGNOSIS — E66.01 MORBID OBESITY (HCC): Primary | ICD-10-CM

## 2022-01-05 DIAGNOSIS — E11.9 TYPE 2 DIABETES MELLITUS WITHOUT COMPLICATION, WITHOUT LONG-TERM CURRENT USE OF INSULIN (HCC): ICD-10-CM

## 2022-01-05 DIAGNOSIS — E66.01 OBESITY, MORBID, BMI 40.0-49.9 (HCC): ICD-10-CM

## 2022-01-05 DIAGNOSIS — I10 ESSENTIAL HYPERTENSION: ICD-10-CM

## 2022-01-05 DIAGNOSIS — E66.01 MORBID OBESITY WITH BMI OF 45.0-49.9, ADULT (HCC): ICD-10-CM

## 2022-01-05 PROCEDURE — 99214 OFFICE O/P EST MOD 30 MIN: CPT | Performed by: SPECIALIST

## 2022-01-05 NOTE — PROGRESS NOTES
Bariatric Surgery Consultation  He started the process with a virtual consult in late Oct 2021    Subjective: The patient is a 39 y.o. obese male with a Body mass index is 48.99 kg/m². .  The patient is currently his heaviest weight for the past several years. he has been overweight since childhood. he has been considering surgery since last year. he desires surgery at this time because of multiple health concerns and their lifestyle issues which are hindered by their weight. he has been referred by his family physician for evaluation and treatment of their obesity via surgical intervention. Sendy Boles has tried multiple diets in his lifetime most recently tried physician supervised, behavior modification and unsupervised diets    Bariatric comorbidities present are   Patient Active Problem List   Diagnosis Code    Obesity, morbid, BMI 40.0-49.9 ([address] E66.01    Lumbar facet arthropathy M47.816    Controlled type 2 diabetes mellitus, without long-term current use of insulin ([facility]) E11.9    Proliferative diabetic retinopathy of both eyes ([address] T98.1745    Essential hypertension I10    Cataract of both eyes H26.9    Hypertensive retinopathy of both eyes H35.033    Hyperlipidemia LDL goal <70 E78.5    Erectile dysfunction N52.9    Snoring R06.83    Anemia D64.9    Stage 3b chronic kidney disease (HCC) N18.32    Hypertension I10    Diabetes ([address] E11.9    Morbid obesity ([address] E66.01    Morbid obesity with BMI of 45.0-49.9, adult ([facility]) E66.01, Z68.42    Diabetes mellitus ([address] E11.9    Hx of diabetic foot ulcer Z86.31    HI (acute kidney injury) ([address] N17.9    Acid reflux K21.9       The patient is considering laparoscopic sleeve gastrectomy for surgical weight loss due to their ineffective progress with medical forms of weight loss and the urging of their physician who cares for their primary medical issues.  The patient  now presents  for consideration for weight loss surgery understanding the benefits of this over a medical approach of weight loss as was discussed in our presentation on weight loss surgery. They have discussed their plans both with their family and primary care physician who is in support of their pursuit of such. The patient has had no health issues as of late and denies and gastrointestinal disturbances other than what is outlined below in their review of symptoms. All of their prior evaluations available by both their PCP's and specialists physicians have been reviewed today either in the Care Everywhere portal or scanned under the media tab. I have spent a large portion of my initial consultation today reviewing the patients current dietary habits which have contributed to their health issues and obesity. I have suggested to them personally a dietary regimen that they can initiate now to help with their status as it pertains to their weight. They understand that the most important aspect of their journey through their weight loss endeavor will be their adherence to a new lifestyle of healthy eating behavior. They also understand that an adherence to an exercise program will not only help with weight loss but is ultimately important in weight maintenance. The patients goal weight is 168lb.      Patient Active Problem List    Diagnosis Date Noted    Hypertension     Diabetes ([address]     Morbid obesity ([address]     Morbid obesity with BMI of 45.0-49.9, adult ([address]     Diabetes mellitus ([address]     Hx of diabetic foot ulcer     HI (acute kidney injury) ([address]     Acid reflux     Stage 3b chronic kidney disease ([address] 09/12/2021    Erectile dysfunction 01/05/2020    Snoring 01/05/2020    Anemia 01/05/2020    Hyperlipidemia LDL goal <70 12/14/2018    Cataract of both eyes 10/08/2018    Hypertensive retinopathy of both eyes 10/08/2018    Essential hypertension 09/06/2018    Controlled type 2 diabetes mellitus, without long-term current use of insulin ([address] 06/19/2018    Proliferative diabetic retinopathy of both eyes ([facility] 06/19/2018    Obesity, morbid, BMI 40.0-49.9 ([facility] 05/20/2018    Lumbar facet arthropathy 05/20/2018     Past Surgical History:   Procedure Laterality Date    HX AMPUTATION TOE  2018      Social History     Tobacco Use    Smoking status: Never Smoker    Smokeless tobacco: Never Used   Substance Use Topics    Alcohol use: No      Family History   Problem Relation Age of Onset    Kidney Disease Mother         ESRD - HD      Current Outpatient Medications   Medication Sig Dispense Refill    losartan (COZAAR) 100 mg tablet Take 1 tablet by mouth once daily 90 Tablet 3    hydrALAZINE (APRESOLINE) 50 mg tablet Take 50 mg by mouth three (3) times daily.  ezetimibe (ZETIA) 10 mg tablet Take 1 tablet by mouth once daily 90 Tablet 3    Jardiance 25 mg tablet Take 1 tablet by mouth once daily 30 Tablet 3    atorvastatin (LIPITOR) 40 mg tablet Take 1 tablet by mouth once daily 90 Tablet 3    TRUEplus Lancets 33 gauge misc USE TO CHECK BLOOD SUGAR TWICE DAILY      carvediloL (Coreg) 25 mg tablet Take 1 Tablet by mouth two (2) times daily (with meals). 60 Tablet 5    fluticasone propionate (FLONASE) 50 mcg/actuation nasal spray Use 2 spray(s) in each nostril once daily 16 g 5    Blood-Glucose Meter (True Metrix Glucose Meter) misc Check glucose twice daily. DX D68.87F7 50 Each 5    glucose blood VI test strips (blood glucose test) strip Check glucose twice daily. DX E11.37X9 727 Strip 5    Trulicity 6.22 IQ/3.2 mL sub-q pen INJECT 1 PEN SUBCUTANEOUSLY ONCE A WEEK 12 mL 5    lancets misc USE TO CHECK BLOOD SUGAR TWICE DAILY 100 Each 5    Blood-Glucose Meter monitoring kit Use to check blood sugar 2 times per day.  1 Kit 0     Allergies   Allergen Reactions    Amlodipine Swelling     Had swelling in his legs and feet - states was painful to walk        Review of Systems:     General - No history or complaints of unexpected fever, chills, or weight loss  Head/Neck - No history or complaints of headache, diplopia, dysphagia, hearing loss  Cardiac - No history or complaints of chest pain, palpitations, murmur, or shortness of breath  Pulmonary - No history or complaints of shortness of breath, productive cough, hemoptysis  Gastrointestinal - (+) reflux, no abdominal pain, obstipation/constipation or blood per rectum  Genitourinary - No history or complaints of hematuria/dysuria, stress urinary incontinence symptoms, or renal lithiasis  Musculoskeletal - mild joint pain in their knees,  no muscular weakness  Hematologic - No history or complaints of bleeding disorders,  No blood transfusions  Neurologic - No history or complaints of  migraine headaches, seizure activity, syncopal episodes, TIA or stroke  Integumentary - No history or complaints of rashes, abnormal nevi, skin cancer    Objective:     Visit Vitals  BP (!) 170/88 (BP 1 Location: Left lower arm, BP Patient Position: Sitting)   Pulse 75   Resp 18   Ht 5' 6\" (1.676 m)   Wt 137.7 kg (303 lb 8 oz)   SpO2 100%   BMI 48.99 kg/m²     Physical Examination: General appearance - alert, well appearing, and in no distress  Mental status - alert, oriented to person, place, and time  Eyes - pupils equal and reactive, extraocular eye movements intact  Nose - normal and patent, no erythema, discharge or polyps  Mouth - mucous membranes moist, pharynx normal without lesions  Neck - supple, no significant adenopathy  Lymphatics - no palpable lymphadenopathy, no hepatosplenomegaly  Chest - clear to auscultation, no wheezes, rales or rhonchi, symmetric air entry  Heart - normal rate, regular rhythm, normal S1, S2, no murmurs, rubs, clicks or gallops  Abdomen - soft, nontender, nondistended, no masses or organomegaly  Back exam - full range of motion, no tenderness, palpable spasm or pain on motion  Neurological - alert, oriented, normal speech, no focal findings or movement disorder noted  Musculoskeletal - no joint tenderness, deformity or swelling  Extremities - peripheral pulses normal, no pedal edema, no clubbing or cyanosis  Skin - normal coloration and turgor, no rashes, no suspicious skin lesions noted    Labs:       No results found for this or any previous visit (from the past 1440 hour(s)). Assessment:     Morbid obesity with comorbidity    Plan:     laparoscopic sleeve gastrectomy    This is a 39 y.o. male with a BMI of Body mass index is 48.99 kg/m². and the weight-related co-morbidties as noted below. Freida Bernardo meets the NIH criteria for bariatric surgery based upon the BMI of Body mass index is 48.99 kg/m². and multiple weight-related co-morbidties. Freida Bernardo has elected laparoscopic sleeve gastrectomy as his intervention of choice for treatment of morbid obestiy through surgical means secondary to its safety profile, rapid return to work  and decreases in operative risks over gastric bypass. In the office today, following Arie's history and physical examination, a 30 minute discussion regarding the anatomic alterations for the laparoscopic sleeve gastrectomy was undertaken. The dietary expectations and the patient and physician dependent factors for success were thoroughly discussed, to include the need for interval follow-up and long-term dietary changes associated with success. The possible complications of the sleeve gastrectomy  were also discussed, to include;death, DVT/PE, staple line leak, bleeding, stricture formation, infection, nutritional deficiencies and sleeve dilation. Specific weight related outcomes for success were also discussed with an emphasis on careful and close follow-up with the first year and eating behavior modification as the baseline and cyclical hunger return. The patient expressed an understanding of the above factors, and his questions were answered in their entirety.     In addition, the patient attended a 1.5 hour power point seminar regarding obesity, surgical weight loss including, adjustable gastric band, gastric bypass, and sleeve gastrectomy. This discussion contrasted the different surgical techniques, mechanisms of actions and expected outcomes, and surgical and medical risks associated with each procedure. During this seminar, there was a long question and answer session where each questions was answered until there were no additional questions. Today, the patient had all of his questions answered and desires to proceed with  bariatric surgery initially choosing sleeve gastrectomy as his surgical option. He states that he had a sleep study in October but does not know the results. His last known Cr- was in November 2021 and was 1.97 (down from 2.35 in Sept 2021)    HgbA1c was 6.3 in Nov 2021    He has completed his H. Pylori treatment    Secondary Diagnoses:     Dietary Intervention  - The patient is currently scheduled to see or has been followed by a bariatric nutritionist for an attempt at preoperative weight loss as has been dictated by their insurance carrier. They will be assessed at various times during their follow up to evaluate their progress depending on the length of time that is required once again by their carrier. I have explained the importance of preoperative weight loss and the benefits regarding lower surgical risk and also assisting the patient in reaching their weight loss goal.  Finally they understand their is a physiologic benefit from the standpoint of hepatic volume reduction preoperatively. I have reiterated the importance of a low carbohydrate and high protein regimen to achieve their stated goal.     GERD -The patient understands that weight loss surgery is not a guaranteed cure for reflux disease but does understand the benefits that weight loss can have on reflux disease. They also understand that at the time of surgery the gastroesophageal junction will be evaluated for the presence of a diaphragmatic hernia.   Hernias will be corrected always with the gastric band and sleeve gastrectomy procedures, but only on a case by case basis with the gastric bypass if it prevents our ability to perform the operation at hand, or if I feel that they would benefit long term with correction of this issue. The patient also understands that neither weight loss surgery nor repair of a diaphragmatic hernia repair guarantees the complete cessation of the disease. They also understand there is a possibility of recurrence with a simple crural repair as is performed with these procedures. They understand they may have to continue their medications in the postoperative period. They have a good understanding that the gastric bypass procedure is better suited to total resolution of this issue and that neither the Lap Band nor sleeve gastrectomy is considered a curative procedure as it pertains to this diagnosis. Hypertension - The patient has a clear understanding of how weight loss improves hypertension as a whole, but also they understand that there is a significant genetic component to this disease process. We will monitor the patients blood pressure while in the hospital and the plan would be to continue those medications postoperatively.  If a diuretic is being used we will stop them on discharge to prevent dehydration particularly with the sleeve gastrectomy and the gastric bypass procedures.  They will be instructed to monitor their blood pressure postoperatively while at home and notify their primary care physician in the event of any significantly high or uncharacteristic readings. Hyperlipidemia - The patient understands that studies show that almost all patient will realize an improvement in their lipid profile with weight loss that occurs with these procedures.  They however also understand that hyperlipidemia is a multifactorial disease particularly as it pertains to their genetic background and that there is no guarantee toward cure  of this issue. We will resume their medications immediately postoperatively as this tends to decrease any post operative cardiac events.  The patient will follow up with their family physician in the postoperative period with plans to repeat their lipid panel 2-3 month postoperative for potential adjustment or removal of these medications. Adult Onset Diabetes - The patient has sarah given a very low carbohydrate diet preoperatively along with instructions to monitor their blood sugars on a regular daily basis. When  their surgery is performed  we will be monitoring the patient with sliding scale insulin and accuchecks.  Based on those values we will determine whether the patient needs a reduction of those medications postoperatively or total removal of those medications on discharge.  We will have the patient continue accuchecks postoperatively while at home also and report to me or their family physician for appropriate adjustments as needed.  The patient also understands that in the event of uncontrolled blood sugar preoperatively that we may choose to postpone their surgery.     Signed By: Tom Royal MD     January 5, 2022

## 2022-01-05 NOTE — PROGRESS NOTES
Ayo Ascencio presents today for   Chief Complaint   Patient presents with    Follow-up       Is someone accompanying this pt? no    Is the patient using any DME equipment during OV? no    Coordination of Care:  1. Have you been to the ER, urgent care clinic since your last visit? Hospitalized since your last visit? no    2. Have you seen or consulted any other health care providers outside of the [address] [place] [address] since your last visit? Include any pap smears or colon screening.  no

## 2022-01-05 NOTE — PATIENT INSTRUCTIONS

## 2022-01-11 ENCOUNTER — TELEPHONE (OUTPATIENT)
Dept: PULMONOLOGY | Age: 37
End: 2022-01-11

## 2022-01-11 NOTE — TELEPHONE ENCOUNTER
Pt ADRIANE([phone]). Pt states that he has never gotten a call regarding his home sleep study results that was done 11/4/21. He has called and no response. Please have someone call him with the results.

## 2022-02-03 ENCOUNTER — DOCUMENTATION ONLY (OUTPATIENT)
Dept: PULMONOLOGY | Age: 37
End: 2022-02-03

## 2022-02-07 ENCOUNTER — HOSPITAL ENCOUNTER (OUTPATIENT)
Dept: ULTRASOUND IMAGING | Age: 37
Discharge: HOME | End: 2022-02-07
Attending: INTERNAL MEDICINE
Payer: MEDICAID

## 2022-02-07 DIAGNOSIS — N18.31 CHRONIC KIDNEY DISEASE, STAGE 3A (HCC): ICD-10-CM

## 2022-02-07 PROCEDURE — 76770 US EXAM ABDO BACK WALL COMP: CPT

## 2022-02-09 DIAGNOSIS — N18.32 STAGE 3B CHRONIC KIDNEY DISEASE (HCC): ICD-10-CM

## 2022-02-09 DIAGNOSIS — I10 ESSENTIAL HYPERTENSION: ICD-10-CM

## 2022-02-09 DIAGNOSIS — E11.37X9 CONTROLLED TYPE 2 DIABETES MELLITUS WITH DIABETIC MACULAR EDEMA RESOLVED AFTER TREATMENT, WITHOUT LONG-TERM CURRENT USE OF INSULIN, UNSPECIFIED LATERALITY (HCC): ICD-10-CM

## 2022-02-09 NOTE — TELEPHONE ENCOUNTER
APAP 8/20 order faxed to [facility], [phone]. Patient contacted and study results reviewed with him. DME info provided to patient and he is encouraged to contact our office with any additional questions or concerns he may have. Patient should hear from [facility] within 3-4 weeks. If not, I have requested they call the office to let me know. Due to the current situations with the [employer]arde[employer] recall and COVID pandemic, we are experiencing a back log in CPAP & BiPAP setups. At this time there is a minimum time frame of 6-8 week set up. Patient has been advised of this and has verbalized understanding.

## 2022-02-09 NOTE — TELEPHONE ENCOUNTER
APAP 8/20 order faxed to [facility], [phone]. Patient contacted and study results reviewed with him. DME info provided to patient and he is encouraged to contact our office with any additional questions or concerns he may have. Patient should hear from [facility] within 3-4 weeks. If not, I have requested they call the office to let me know. Due to the current situations with the Pardeep recall and COVID pandemic, we are experiencing a back log in CPAP & BiPAP setups. At this time there is a minimum time frame of 6-8 week set up. Patient has been advised of this and has verbalized understanding.

## 2022-02-17 DIAGNOSIS — E66.01 MORBID OBESITY (HCC): ICD-10-CM

## 2022-02-17 DIAGNOSIS — I10 HYPERTENSION, UNSPECIFIED TYPE: Primary | ICD-10-CM

## 2022-02-17 DIAGNOSIS — Z01.812 BLOOD TESTS PRIOR TO TREATMENT OR PROCEDURE: ICD-10-CM

## 2022-02-17 DIAGNOSIS — E11.9 TYPE 2 DIABETES MELLITUS WITHOUT COMPLICATION, UNSPECIFIED WHETHER LONG TERM INSULIN USE (HCC): ICD-10-CM

## 2022-02-28 ENCOUNTER — OFFICE VISIT (OUTPATIENT)
Dept: SURGERY | Age: 37
End: 2022-02-28

## 2022-02-28 ENCOUNTER — NURSE NAVIGATOR (OUTPATIENT)
Dept: SURGERY | Age: 37
End: 2022-02-28

## 2022-02-28 ENCOUNTER — HOSPITAL ENCOUNTER (OUTPATIENT)
Dept: PREADMISSION TESTING | Age: 37
Discharge: HOME | End: 2022-02-28
Payer: MEDICAID

## 2022-02-28 VITALS
BODY MASS INDEX: 49.58 KG/M2 | OXYGEN SATURATION: 100 % | RESPIRATION RATE: 16 BRPM | SYSTOLIC BLOOD PRESSURE: 146 MMHG | WEIGHT: 308.5 LBS | HEIGHT: 66 IN | TEMPERATURE: 98.4 F | HEART RATE: 93 BPM | DIASTOLIC BLOOD PRESSURE: 85 MMHG

## 2022-02-28 DIAGNOSIS — E78.5 HYPERLIPIDEMIA LDL GOAL <70: ICD-10-CM

## 2022-02-28 DIAGNOSIS — E66.01 MORBID OBESITY WITH BMI OF 45.0-49.9, ADULT (HCC): ICD-10-CM

## 2022-02-28 DIAGNOSIS — E66.01 MORBID OBESITY (HCC): Primary | ICD-10-CM

## 2022-02-28 DIAGNOSIS — I10 ESSENTIAL HYPERTENSION: ICD-10-CM

## 2022-02-28 DIAGNOSIS — E11.22 TYPE 2 DIABETES MELLITUS WITH STAGE 3 CHRONIC KIDNEY DISEASE, WITH LONG-TERM CURRENT USE OF INSULIN, UNSPECIFIED WHETHER STAGE 3A OR 3B CKD (HCC): ICD-10-CM

## 2022-02-28 DIAGNOSIS — I10 HYPERTENSION, UNSPECIFIED TYPE: ICD-10-CM

## 2022-02-28 DIAGNOSIS — G47.30 SLEEP APNEA, UNSPECIFIED TYPE: ICD-10-CM

## 2022-02-28 DIAGNOSIS — D64.9 ANEMIA, UNSPECIFIED TYPE: ICD-10-CM

## 2022-02-28 DIAGNOSIS — G89.18 POST-OP PAIN: ICD-10-CM

## 2022-02-28 DIAGNOSIS — K21.9 GASTROESOPHAGEAL REFLUX DISEASE, UNSPECIFIED WHETHER ESOPHAGITIS PRESENT: ICD-10-CM

## 2022-02-28 DIAGNOSIS — E11.9 TYPE 2 DIABETES MELLITUS WITHOUT COMPLICATION, UNSPECIFIED WHETHER LONG TERM INSULIN USE (HCC): ICD-10-CM

## 2022-02-28 DIAGNOSIS — N52.9 ERECTILE DYSFUNCTION, UNSPECIFIED ERECTILE DYSFUNCTION TYPE: ICD-10-CM

## 2022-02-28 DIAGNOSIS — Z79.4 TYPE 2 DIABETES MELLITUS WITH STAGE 3 CHRONIC KIDNEY DISEASE, WITH LONG-TERM CURRENT USE OF INSULIN, UNSPECIFIED WHETHER STAGE 3A OR 3B CKD (HCC): ICD-10-CM

## 2022-02-28 DIAGNOSIS — N18.30 TYPE 2 DIABETES MELLITUS WITH STAGE 3 CHRONIC KIDNEY DISEASE, WITH LONG-TERM CURRENT USE OF INSULIN, UNSPECIFIED WHETHER STAGE 3A OR 3B CKD (HCC): ICD-10-CM

## 2022-02-28 DIAGNOSIS — E66.01 MORBID OBESITY (HCC): ICD-10-CM

## 2022-02-28 DIAGNOSIS — Z01.812 BLOOD TESTS PRIOR TO TREATMENT OR PROCEDURE: ICD-10-CM

## 2022-02-28 LAB
ABO + RH BLD: NORMAL
ALBUMIN SERPL-MCNC: 3.3 G/DL (ref 3.4–5)
ALBUMIN/GLOB SERPL: 1 {RATIO} (ref 0.8–1.7)
ALP SERPL-CCNC: 74 U/L (ref 45–117)
ALT SERPL-CCNC: 37 U/L (ref 16–61)
ANION GAP SERPL CALC-SCNC: 6 MMOL/L (ref 3–18)
AST SERPL-CCNC: 17 U/L (ref 10–38)
BASOPHILS # BLD: 0 K/UL (ref 0–0.1)
BASOPHILS NFR BLD: 0 % (ref 0–2)
BILIRUB SERPL-MCNC: 0.4 MG/DL (ref 0.2–1)
BLOOD GROUP ANTIBODIES SERPL: NORMAL
BUN SERPL-MCNC: 52 MG/DL (ref 7–18)
BUN/CREAT SERPL: 22 (ref 12–20)
CALCIUM SERPL-MCNC: 8.5 MG/DL (ref 8.5–10.1)
CHLORIDE SERPL-SCNC: 109 MMOL/L (ref 100–111)
CO2 SERPL-SCNC: 27 MMOL/L (ref 21–32)
CREAT SERPL-MCNC: 2.38 MG/DL (ref 0.6–1.3)
DIFFERENTIAL METHOD BLD: ABNORMAL
EOSINOPHIL # BLD: 0.5 K/UL (ref 0–0.4)
EOSINOPHIL NFR BLD: 5 % (ref 0–5)
ERYTHROCYTE [DISTWIDTH] IN BLOOD BY AUTOMATED COUNT: 13.3 % (ref 11.6–14.5)
EST. AVERAGE GLUCOSE BLD GHB EST-MCNC: 111 MG/DL
GLOBULIN SER CALC-MCNC: 3.4 G/DL (ref 2–4)
GLUCOSE SERPL-MCNC: 127 MG/DL (ref 74–99)
HBA1C MFR BLD: 5.5 % (ref 4.2–5.6)
HCT VFR BLD AUTO: 37.7 % (ref 36–48)
HGB BLD-MCNC: 11.3 G/DL (ref 13–16)
IMM GRANULOCYTES # BLD AUTO: 0.1 K/UL (ref 0–0.04)
IMM GRANULOCYTES NFR BLD AUTO: 1 % (ref 0–0.5)
LYMPHOCYTES # BLD: 2.7 K/UL (ref 0.9–3.6)
LYMPHOCYTES NFR BLD: 30 % (ref 21–52)
MCH RBC QN AUTO: 25.7 PG (ref 24–34)
MCHC RBC AUTO-ENTMCNC: 30 G/DL (ref 31–37)
MCV RBC AUTO: 85.7 FL (ref 78–100)
MONOCYTES # BLD: 0.7 K/UL (ref 0.05–1.2)
MONOCYTES NFR BLD: 8 % (ref 3–10)
NEUTS SEG # BLD: 5.1 K/UL (ref 1.8–8)
NEUTS SEG NFR BLD: 56 % (ref 40–73)
NRBC # BLD: 0 K/UL (ref 0–0.01)
NRBC BLD-RTO: 0 PER 100 WBC
PLATELET # BLD AUTO: 299 K/UL (ref 135–420)
PMV BLD AUTO: 9.7 FL (ref 9.2–11.8)
POTASSIUM SERPL-SCNC: 4.8 MMOL/L (ref 3.5–5.5)
PROT SERPL-MCNC: 6.7 G/DL (ref 6.4–8.2)
RBC # BLD AUTO: 4.4 M/UL (ref 4.35–5.65)
SODIUM SERPL-SCNC: 142 MMOL/L (ref 136–145)
SPECIMEN EXP DATE BLD: NORMAL
WBC # BLD AUTO: 9.1 K/UL (ref 4.6–13.2)

## 2022-02-28 PROCEDURE — 99215 OFFICE O/P EST HI 40 MIN: CPT | Performed by: SPECIALIST

## 2022-02-28 PROCEDURE — 83036 HEMOGLOBIN GLYCOSYLATED A1C: CPT

## 2022-02-28 PROCEDURE — 93005 ELECTROCARDIOGRAM TRACING: CPT

## 2022-02-28 PROCEDURE — 80053 COMPREHEN METABOLIC PANEL: CPT

## 2022-02-28 PROCEDURE — 86900 BLOOD TYPING SEROLOGIC ABO: CPT

## 2022-02-28 PROCEDURE — 36415 COLL VENOUS BLD VENIPUNCTURE: CPT

## 2022-02-28 PROCEDURE — 85025 COMPLETE CBC W/AUTO DIFF WBC: CPT

## 2022-02-28 RX ORDER — ENOXAPARIN SODIUM 100 MG/ML
40 INJECTION SUBCUTANEOUS EVERY 12 HOURS
Qty: 20 EACH | Refills: 0 | Status: SHIPPED | OUTPATIENT
Start: 2022-02-28 | End: 2022-03-01 | Stop reason: SDUPTHER

## 2022-02-28 RX ORDER — ONDANSETRON 8 MG/1
8 TABLET, ORALLY DISINTEGRATING ORAL
Qty: 60 TABLET | Refills: 0 | Status: SHIPPED | OUTPATIENT
Start: 2022-02-28 | End: 2022-03-01 | Stop reason: SDUPTHER

## 2022-02-28 RX ORDER — OXYCODONE AND ACETAMINOPHEN 5; 325 MG/1; MG/1
1 TABLET ORAL
Qty: 30 TABLET | Refills: 0 | Status: SHIPPED | OUTPATIENT
Start: 2022-02-28 | End: 2022-03-03

## 2022-02-28 NOTE — H&P (VIEW-ONLY)
Sleeve Gastrectomy - History and Physical    Subjective: The patient is a 39 y.o. obese male with a Body mass index is 49.79 kg/m². Jessica Guerin he presents now to review their work up to date to see if they are a candidate for surgery and whether or not to proceed with the previously requested procedure. Bariatric comorbidities continue to include:   Patient Active Problem List   Diagnosis Code    Obesity, morbid, BMI 40.0-49.9 ([facility] E66.01    Lumbar facet arthropathy M47.816    Controlled type 2 diabetes mellitus, without long-term current use of insulin ([facility]) E11.9    Proliferative diabetic retinopathy of both eyes ([facility] M68.2811    Essential hypertension I10    Cataract of both eyes H26.9    Hypertensive retinopathy of both eyes H35.033    Hyperlipidemia LDL goal <70 E78.5    Erectile dysfunction N52.9    Snoring R06.83    Anemia D64.9    Stage 3b chronic kidney disease ([facility]) N18.32    Hypertension I10    Diabetes ([facility] E11.9    Morbid obesity ([facility]) E66.01    Morbid obesity with BMI of 45.0-49.9, adult ([facility]) E66.01, Z68.42    Diabetes mellitus ([facility] E11.9    Hx of diabetic foot ulcer Z86.31    HI (acute kidney injury) ([facility] N17.9    Acid reflux K21.9    Sleep apnea G47.30      They have been generally well prior to this visit and have had no recent significant illnesses. The patient has had no gastrointestinal issues that would preclude them from proceeding with the surgery they have chosen. Noemy Josettescott has recently tried a preoperative weight loss program  in addition to seeing a bariatric nutritionist preoperatively. We have discussed on at least one other occasion about the various types of surgical weight loss procedures and they have considered these options after our initial consultation. We have once again discussed these procedures in detail and they have now decided on a surgical procedure.   They present today to discuss this and confirm that their evaluation pre operatively is acceptable to continue with surgery. The patient desires laparoscopic sleeve gastrectomy for surgical weight loss. The patients goal weight is 167 lb. (this represents a BMI of 27)      Patient Active Problem List    Diagnosis Date Noted    Sleep apnea     Hypertension     [facility]     Morbid obesity ([facility]     Morbid obesity with BMI of 45.0-49.9, adult ([facility]     Diabetes mellitus ([facility]     Hx of diabetic foot ulcer     [facility]     Acid reflux     Stage 3b chronic kidney disease ([facility] 09/12/2021    Erectile dysfunction 01/05/2020    Snoring 01/05/2020    Anemia 01/05/2020    Hyperlipidemia LDL goal <70 12/14/2018    Cataract of both eyes 10/08/2018    Hypertensive retinopathy of both eyes 10/08/2018    Essential hypertension 09/06/2018    Controlled type 2 diabetes mellitus, without long-term current use of insulin ([facility] 06/19/2018    Proliferative diabetic retinopathy of both eyes ([facility] 06/19/2018    Obesity, morbid, BMI 40.0-49.9 ([facility] 05/20/2018    Lumbar facet arthropathy 05/20/2018     Past Surgical History:   Procedure Laterality Date    HX AMPUTATION TOE  2018      Social History     Tobacco Use    Smoking status: Never Smoker    Smokeless tobacco: Never Used   Substance Use Topics    Alcohol use: No      Family History   Problem Relation Age of Onset    Kidney Disease Mother         ESRD - HD      Current Outpatient Medications   Medication Sig Dispense Refill    oxyCODONE-acetaminophen (PERCOCET) 5-325 mg per tablet Take 1 Tablet by mouth every four (4) hours as needed for Pain for up to 5 days. Max Daily Amount: 6 Tablets.  30 Tablet 0    fluticasone propionate (FLONASE) 50 mcg/actuation nasal spray Use 2 spray(s) in each nostril once daily 16 g 5    Jardiance 25 mg tablet TAKE 1 TABLET BY MOUTH ONCE DAILY 30 Tablet 5    losartan (COZAAR) 100 mg tablet Take 1 tablet by mouth once daily 90 Tablet 3    hydrALAZINE (APRESOLINE) 50 mg tablet Take 50 mg by mouth three (3) times daily.  ezetimibe (ZETIA) 10 mg tablet Take 1 tablet by mouth once daily 90 Tablet 3    atorvastatin (LIPITOR) 40 mg tablet Take 1 tablet by mouth once daily 90 Tablet 3    TRUEplus Lancets 33 gauge misc USE TO CHECK BLOOD SUGAR TWICE DAILY      carvediloL (Coreg) 25 mg tablet Take 1 Tablet by mouth two (2) times daily (with meals). 60 Tablet 5    Blood-Glucose Meter (True Metrix Glucose Meter) misc Check glucose twice daily. DX T22.72I3 50 Each 5    glucose blood VI test strips (blood glucose test) strip Check glucose twice daily. DX E11.37X9 823 Strip 5    Trulicity 7.71 KK/8.9 mL sub-q pen INJECT 1 PEN SUBCUTANEOUSLY ONCE A WEEK 12 mL 5    lancets misc USE TO CHECK BLOOD SUGAR TWICE DAILY 100 Each 5    Blood-Glucose Meter monitoring kit Use to check blood sugar 2 times per day. 1 Kit 0    enoxaparin (LOVENOX) 40 mg/0.4 mL 0.4 mL by SubCUTAneous route every twelve (12) hours every twelve (12) hours for 10 days. Indications: deep vein thrombosis prevention (Patient not taking: Reported on 2/28/2022) 20 Each 0    ondansetron (ZOFRAN ODT) 8 mg disintegrating tablet Take 1 Tablet by mouth every eight (8) hours as needed for Nausea or Vomiting.  (Patient not taking: Reported on 2/28/2022) 60 Tablet 0     Allergies   Allergen Reactions    Amlodipine Swelling     Had swelling in his legs and feet - states was painful to walk        Review of Systems:     General - No history or complaints of unexpected fever, chills, or weight loss  Head/Neck - No history or complaints of headache, diplopia, dysphagia, hearing loss  Cardiac - No history or complaints of chest pain, palpitations, murmur, or shortness of breath  Pulmonary - No history or complaints of shortness of breath, productive cough, hemoptysis  Gastrointestinal - (+) history of reflux, No history or complaints abdominal pain, obstipation/constipation, blood per rectum  Genitourinary - No history or complaints of hematuria/dysuria, stress urinary incontinence symptoms, or renal lithiasis  Musculoskeletal - No history or complaints of joint pain or muscular weakness  Hematologic - No history or complaints of bleeding disorders, blood transfusions, sickle cell anemia  Neurologic - No history or complaints of  migraine headaches, seizure activity, syncopal episodes, TIA or stroke  Integumentary - No history or complaints of rashes, abnormal nevi, skin cancer    Objective:     Visit Vitals  BP (!) 146/85   Pulse 93   Temp 98.4 °F (36.9 °C)   Resp 16   Ht 5' 6\" (1.676 m)   Wt 139.9 kg (308 lb 8 oz)   SpO2 100%   BMI 49.79 kg/m²       Physical Examination: General appearance - alert, well appearing, and in no distress  Mental status - alert, oriented to person, place, and time  Eyes - pupils equal and reactive, extraocular eye movements intact  Nose - normal and patent, no erythema, discharge or polyps  Mouth - mucous membranes moist, pharynx normal without lesions  Neck - supple, no significant adenopathy  Lymphatics - no palpable lymphadenopathy, no hepatosplenomegaly  Chest - clear to auscultation, no wheezes, rales or rhonchi, symmetric air entry  Heart - normal rate, regular rhythm, normal S1, S2, no murmurs, rubs, clicks or gallops  Abdomen - soft, nontender, nondistended, no masses or organomegaly  Back exam - full range of motion, no tenderness, palpable spasm or pain on motion  Neurological - alert, oriented, normal speech, no focal findings or movement disorder noted  Musculoskeletal - no joint tenderness, deformity or swelling  Extremities - peripheral pulses normal, no pedal edema, no clubbing or cyanosis  Skin - normal coloration and turgor, no rashes, no suspicious skin lesions noted    Labs / Preoperative Evaluation:        Recent Results (from the past 1008 hour(s))   EKG, 12 LEAD, INITIAL    Collection Time: 02/28/22  1:35 PM   Result Value Ref Range    Ventricular Rate 88 BPM    Atrial Rate 88 BPM P-R Interval 164 ms    QRS Duration 96 ms    Q-T Interval 372 ms    QTC Calculation (Bezet) 450 ms    Calculated P Axis 65 degrees    Calculated R Axis -6 degrees    Calculated T Axis 13 degrees    Diagnosis       Normal sinus rhythm  Minimal voltage criteria for LVH, may be normal variant  Borderline ECG  No previous ECGs available     CBC WITH AUTOMATED DIFF    Collection Time: 02/28/22  1:39 PM   Result Value Ref Range    WBC 9.1 4.6 - 13.2 K/uL    RBC 4.40 4.35 - 5.65 M/uL    HGB 11.3 (L) 13.0 - 16.0 g/dL    HCT 37.7 36.0 - 48.0 %    MCV 85.7 78.0 - 100.0 FL    MCH 25.7 24.0 - 34.0 PG    MCHC 30.0 (L) 31.0 - 37.0 g/dL    RDW 13.3 11.6 - 14.5 %    PLATELET 571 440 - 869 K/uL    MPV 9.7 9.2 - 11.8 FL    NRBC 0.0 0  WBC    ABSOLUTE NRBC 0.00 0.00 - 0.01 K/uL    NEUTROPHILS 56 40 - 73 %    LYMPHOCYTES 30 21 - 52 %    MONOCYTES 8 3 - 10 %    EOSINOPHILS 5 0 - 5 %    BASOPHILS 0 0 - 2 %    IMMATURE GRANULOCYTES 1 (H) 0.0 - 0.5 %    ABS. NEUTROPHILS 5.1 1.8 - 8.0 K/UL    ABS. LYMPHOCYTES 2.7 0.9 - 3.6 K/UL    ABS. MONOCYTES 0.7 0.05 - 1.2 K/UL    ABS. EOSINOPHILS 0.5 (H) 0.0 - 0.4 K/UL    ABS. BASOPHILS 0.0 0.0 - 0.1 K/UL    ABS. IMM. GRANS. 0.1 (H) 0.00 - 0.04 K/UL    DF AUTOMATED     METABOLIC PANEL, COMPREHENSIVE    Collection Time: 02/28/22  1:39 PM   Result Value Ref Range    Sodium 142 136 - 145 mmol/L    Potassium 4.8 3.5 - 5.5 mmol/L    Chloride 109 100 - 111 mmol/L    CO2 27 21 - 32 mmol/L    Anion gap 6 3.0 - 18 mmol/L    Glucose 127 (H) 74 - 99 mg/dL    BUN 52 (H) 7.0 - 18 MG/DL    Creatinine 2.38 (H) 0.6 - 1.3 MG/DL    BUN/Creatinine ratio 22 (H) 12 - 20      GFR est AA 37 (L) >60 ml/min/1.73m2    GFR est non-AA 31 (L) >60 ml/min/1.73m2    Calcium 8.5 8.5 - 10.1 MG/DL    Bilirubin, total 0.4 0.2 - 1.0 MG/DL    ALT (SGPT) 37 16 - 61 U/L    AST (SGOT) 17 10 - 38 U/L    Alk.  phosphatase 74 45 - 117 U/L    Protein, total 6.7 6.4 - 8.2 g/dL    Albumin 3.3 (L) 3.4 - 5.0 g/dL    Globulin 3.4 2.0 - 4.0 g/dL    A-G Ratio 1.0 0.8 - 1.7         Assessment:     Morbid obesity with comorbidity    Plan:     laparoscopic sleeve gastrectomy    This is a 39 y.o. male with a BMI of Body mass index is 49.79 kg/m². and the weight-related co-morbidties as noted above. Delia Ozuna meets the NIH criteria for bariatric surgery based upon the BMI of Body mass index is 49.79 kg/m². and multiple weight-related co-morbidties. Delia Ozuna has elected laparoscopic sleeve gastrectomy as his intervention of choice for treatment of morbid obestiy through surgical means secondary to its safety profile, rapid return to work  and decreases in operative risks over gastric bypass. In the office today, following Arie's history and physical examination, a 40 minute discussion regarding the anatomic alterations for the laparoscopic sleeve gastrectomy was undertaken. The dietary expectations and the patient  dependent factors for success were thoroughly discussed, to include the need for interval follow-up and long-term dietary changes associated with success. The possible complications of the sleeve gastrectomy  were also discussed, to include;death, DVT/PE, staple line leak, bleeding, stricture formation, infection, nutritional deficiencies and sleeve dilation. Specific weight related outcomes for success were also discussed with an emphasis on careful and close follow-up with the first year and eating behavior modification as the baseline and cyclical hunger return. The patient expressed an understanding of the above factors, and his questions were answered in their entirety. In addition, the patient attended a 1.5 hour power point seminar regarding obesity, surgical weight loss including, adjustable gastric band, gastric bypass, and sleeve gastrectomy. This discussion contrasted the different surgical techniques, mechanisms of actions and expected outcomes, and surgical and medical risks associated with each procedure.   During this seminar, there was a long question and answer session where each questions was answered until there were no additional questions. Today, the patient had all of his questions answered and the decision was made today that the patient's preoperative evaluation is acceptable for them  to proceed with bariatric surgery  choosing the sleeve gastrectomy as his surgical option. The patient understands the plan of action    He has been diagnosed with sleep apnea but has not yet obtained his c-pap    There has been no exposure to nicotine in any form    They have gained 5 lbs since their consult visit    Pre-op UGI was normal     They were not asked to obtain any special clearances prior to proceeding to surgery    He completed his H. Pylori treatment     Cr- is 2.38    His post-op meds have been sent to his pharmacy     Secondary Diagnoses:     DVT / Pulmonary Embolus Risk - The patient is at a higher risk for post operative DVT / pulmonary embolus secondary to their morbid obese status, relative sedentary lifestyle, and impending general anesthetic. We will plan to use anticoagulation therapy pre and post operative as well as  pneumatic compression devices and encourage ambulation once on the hospital nursing floor. The need for possible at home anticoagulation therapy has also been discussed and any decision on this matter will be made during post operative evaluations. The patient understands that their efforts at ambulation are of vital importance to reduce the risk of this complication thus placing significant burden on them as to the prevention of such issues. Signs and symptoms of DVT / PE have been discussed with the patient and they have been instructed to call the office if any these occur in the \"at home\" post op phase    Adult Onset Diabetes - The patient has sarah given a very low carbohydrate diet preoperatively along with instructions to monitor their blood sugars on a regular daily basis.  When  their surgery is performed  we will be monitoring the patient with sliding scale insulin and accuchecks.  Based on those values we will determine whether the patient needs a reduction of those medications postoperatively or total removal of those medications on discharge.  We will have the patient continue accuchecks postoperatively while at home also and report to me or their family physician for appropriate adjustments as needed.  The patient also understands that in the event of uncontrolled blood sugar preoperatively that we may choose to postpone their surgery. Hypertension - The patient has a clear understanding of how weight loss improves hypertension as a whole, but also they understand that there is a significant genetic component to this disease process. We will monitor the patients blood pressure while in the hospital and the plan would be to continue those medications postoperatively.  If a diuretic is being used we will stop them on discharge to prevent dehydration particularly with the sleeve gastrectomy and the gastric bypass procedures.  They will be instructed to monitor their blood pressure postoperatively while at home and notify their primary care physician in the event of any significantly high or uncharacteristic readings. Hyperlipidemia - The patient understands that studies show that almost all patient will realize an improvement in their lipid profile with weight loss that occurs with these procedures. They however also understand that hyperlipidemia is a multifactorial disease particularly as it pertains to their genetic background and that there is no guarantee toward cure  of this issue.  We will resume their medications immediately postoperatively as this tends to decrease any post operative cardiac events.  The patient will follow up with their family physician in the postoperative period with plans to repeat their lipid panel 2-3 month postoperative for potential adjustment or removal of these medications. Obstructive Sleep Apnea -The patient understands the association of sleep apnea and obesity and the additional risk that it caries related to post surgical complications. We will have the patient bring their CPAP machine to the hospital for use both postoperatively in the PACU and on the floor at its appropriate setting.  We will have them continue using it while at home after surgery and follow up with their pulmonologist 6 months after to be retested to see if it can be discontinued at that time period.     Signed By: Maya Sun MD     February 28, 2022

## 2022-02-28 NOTE — PROGRESS NOTES
Sleeve Gastrectomy - History and Physical    Subjective: The patient is a 39 y.o. obese male with a Body mass index is 49.79 kg/m². Jovani Noe he presents now to review their work up to date to see if they are a candidate for surgery and whether or not to proceed with the previously requested procedure. Bariatric comorbidities continue to include:   Patient Active Problem List   Diagnosis Code    Obesity, morbid, BMI 40.0-49.9 ([facility] E66.01    Lumbar facet arthropathy M47.816    Controlled type 2 diabetes mellitus, without long-term current use of insulin ([facility]) E11.9    Proliferative diabetic retinopathy of both eyes ([facility] B30.2321    Essential hypertension I10    Cataract of both eyes H26.9    Hypertensive retinopathy of both eyes H35.033    Hyperlipidemia LDL goal <70 E78.5    Erectile dysfunction N52.9    Snoring R06.83    Anemia D64.9    Stage 3b chronic kidney disease ([facility]) N18.32    Hypertension I10    Diabetes ([facility] E11.9    Morbid obesity ([facility]) E66.01    Morbid obesity with BMI of 45.0-49.9, adult ([facility]) E66.01, Z68.42    Diabetes mellitus ([facility] E11.9    Hx of diabetic foot ulcer Z86.31    HI (acute kidney injury) ([facility] N17.9    Acid reflux K21.9    Sleep apnea G47.30      They have been generally well prior to this visit and have had no recent significant illnesses. The patient has had no gastrointestinal issues that would preclude them from proceeding with the surgery they have chosen. Cedric Zarco has recently tried a preoperative weight loss program  in addition to seeing a bariatric nutritionist preoperatively. We have discussed on at least one other occasion about the various types of surgical weight loss procedures and they have considered these options after our initial consultation. We have once again discussed these procedures in detail and they have now decided on a surgical procedure.   They present today to discuss this and confirm that their evaluation pre operatively is acceptable to continue with surgery. The patient desires laparoscopic sleeve gastrectomy for surgical weight loss. The patients goal weight is 167 lb. (this represents a BMI of 27)      Patient Active Problem List    Diagnosis Date Noted    Sleep apnea     Hypertension     [facility]     Morbid obesity ([facility]     Morbid obesity with BMI of 45.0-49.9, adult ([facility]     Diabetes mellitus ([facility]     Hx of diabetic foot ulcer     HI (acute kidney injury) ([facility]     Acid reflux     Stage 3b chronic kidney disease ([facility] 09/12/2021    Erectile dysfunction 01/05/2020    Snoring 01/05/2020    Anemia 01/05/2020    Hyperlipidemia LDL goal <70 12/14/2018    Cataract of both eyes 10/08/2018    Hypertensive retinopathy of both eyes 10/08/2018    Essential hypertension 09/06/2018    Controlled type 2 diabetes mellitus, without long-term current use of insulin ([facility] 06/19/2018    Proliferative diabetic retinopathy of both eyes ([facility] 06/19/2018    Obesity, morbid, BMI 40.0-49.9 ([facility] 05/20/2018    Lumbar facet arthropathy 05/20/2018     Past Surgical History:   Procedure Laterality Date    HX AMPUTATION TOE  2018      Social History     Tobacco Use    Smoking status: Never Smoker    Smokeless tobacco: Never Used   Substance Use Topics    Alcohol use: No      Family History   Problem Relation Age of Onset    Kidney Disease Mother         ESRD - HD      Current Outpatient Medications   Medication Sig Dispense Refill    oxyCODONE-acetaminophen (PERCOCET) 5-325 mg per tablet Take 1 Tablet by mouth every four (4) hours as needed for Pain for up to 5 days. Max Daily Amount: 6 Tablets.  30 Tablet 0    fluticasone propionate (FLONASE) 50 mcg/actuation nasal spray Use 2 spray(s) in each nostril once daily 16 g 5    Jardiance 25 mg tablet TAKE 1 TABLET BY MOUTH ONCE DAILY 30 Tablet 5    losartan (COZAAR) 100 mg tablet Take 1 tablet by mouth once daily 90 Tablet 3    hydrALAZINE (APRESOLINE) 50 mg tablet Take 50 mg by mouth three (3) times daily.  ezetimibe (ZETIA) 10 mg tablet Take 1 tablet by mouth once daily 90 Tablet 3    atorvastatin (LIPITOR) 40 mg tablet Take 1 tablet by mouth once daily 90 Tablet 3    TRUEplus Lancets 33 gauge misc USE TO CHECK BLOOD SUGAR TWICE DAILY      carvediloL (Coreg) 25 mg tablet Take 1 Tablet by mouth two (2) times daily (with meals). 60 Tablet 5    Blood-Glucose Meter (True Metrix Glucose Meter) misc Check glucose twice daily. DX U55.30S5 50 Each 5    glucose blood VI test strips (blood glucose test) strip Check glucose twice daily. DX E11.37X9 194 Strip 5    Trulicity 7.19 QL/6.4 mL sub-q pen INJECT 1 PEN SUBCUTANEOUSLY ONCE A WEEK 12 mL 5    lancets misc USE TO CHECK BLOOD SUGAR TWICE DAILY 100 Each 5    Blood-Glucose Meter monitoring kit Use to check blood sugar 2 times per day. 1 Kit 0    enoxaparin (LOVENOX) 40 mg/0.4 mL 0.4 mL by SubCUTAneous route every twelve (12) hours every twelve (12) hours for 10 days. Indications: deep vein thrombosis prevention (Patient not taking: Reported on 2/28/2022) 20 Each 0    ondansetron (ZOFRAN ODT) 8 mg disintegrating tablet Take 1 Tablet by mouth every eight (8) hours as needed for Nausea or Vomiting.  (Patient not taking: Reported on 2/28/2022) 60 Tablet 0     Allergies   Allergen Reactions    Amlodipine Swelling     Had swelling in his legs and feet - states was painful to walk        Review of Systems:     General - No history or complaints of unexpected fever, chills, or weight loss  Head/Neck - No history or complaints of headache, diplopia, dysphagia, hearing loss  Cardiac - No history or complaints of chest pain, palpitations, murmur, or shortness of breath  Pulmonary - No history or complaints of shortness of breath, productive cough, hemoptysis  Gastrointestinal - (+) history of reflux, No history or complaints abdominal pain, obstipation/constipation, blood per rectum  Genitourinary - No history or complaints of hematuria/dysuria, stress urinary incontinence symptoms, or renal lithiasis  Musculoskeletal - No history or complaints of joint pain or muscular weakness  Hematologic - No history or complaints of bleeding disorders, blood transfusions, sickle cell anemia  Neurologic - No history or complaints of  migraine headaches, seizure activity, syncopal episodes, TIA or stroke  Integumentary - No history or complaints of rashes, abnormal nevi, skin cancer    Objective:     Visit Vitals  BP (!) 146/85   Pulse 93   Temp 98.4 °F (36.9 °C)   Resp 16   Ht 5' 6\" (1.676 m)   Wt 139.9 kg (308 lb 8 oz)   SpO2 100%   BMI 49.79 kg/m²       Physical Examination: General appearance - alert, well appearing, and in no distress  Mental status - alert, oriented to person, place, and time  Eyes - pupils equal and reactive, extraocular eye movements intact  Nose - normal and patent, no erythema, discharge or polyps  Mouth - mucous membranes moist, pharynx normal without lesions  Neck - supple, no significant adenopathy  Lymphatics - no palpable lymphadenopathy, no hepatosplenomegaly  Chest - clear to auscultation, no wheezes, rales or rhonchi, symmetric air entry  Heart - normal rate, regular rhythm, normal S1, S2, no murmurs, rubs, clicks or gallops  Abdomen - soft, nontender, nondistended, no masses or organomegaly  Back exam - full range of motion, no tenderness, palpable spasm or pain on motion  Neurological - alert, oriented, normal speech, no focal findings or movement disorder noted  Musculoskeletal - no joint tenderness, deformity or swelling  Extremities - peripheral pulses normal, no pedal edema, no clubbing or cyanosis  Skin - normal coloration and turgor, no rashes, no suspicious skin lesions noted    Labs / Preoperative Evaluation:        Recent Results (from the past 1008 hour(s))   EKG, 12 LEAD, INITIAL    Collection Time: 02/28/22  1:35 PM   Result Value Ref Range    Ventricular Rate 88 BPM    Atrial Rate 88 BPM P-R Interval 164 ms    QRS Duration 96 ms    Q-T Interval 372 ms    QTC Calculation (Bezet) 450 ms    Calculated P Axis 65 degrees    Calculated R Axis -6 degrees    Calculated T Axis 13 degrees    Diagnosis       Normal sinus rhythm  Minimal voltage criteria for LVH, may be normal variant  Borderline ECG  No previous ECGs available     CBC WITH AUTOMATED DIFF    Collection Time: 02/28/22  1:39 PM   Result Value Ref Range    WBC 9.1 4.6 - 13.2 K/uL    RBC 4.40 4.35 - 5.65 M/uL    HGB 11.3 (L) 13.0 - 16.0 g/dL    HCT 37.7 36.0 - 48.0 %    MCV 85.7 78.0 - 100.0 FL    MCH 25.7 24.0 - 34.0 PG    MCHC 30.0 (L) 31.0 - 37.0 g/dL    RDW 13.3 11.6 - 14.5 %    PLATELET 456 778 - 703 K/uL    MPV 9.7 9.2 - 11.8 FL    NRBC 0.0 0  WBC    ABSOLUTE NRBC 0.00 0.00 - 0.01 K/uL    NEUTROPHILS 56 40 - 73 %    LYMPHOCYTES 30 21 - 52 %    MONOCYTES 8 3 - 10 %    EOSINOPHILS 5 0 - 5 %    BASOPHILS 0 0 - 2 %    IMMATURE GRANULOCYTES 1 (H) 0.0 - 0.5 %    ABS. NEUTROPHILS 5.1 1.8 - 8.0 K/UL    ABS. LYMPHOCYTES 2.7 0.9 - 3.6 K/UL    ABS. MONOCYTES 0.7 0.05 - 1.2 K/UL    ABS. EOSINOPHILS 0.5 (H) 0.0 - 0.4 K/UL    ABS. BASOPHILS 0.0 0.0 - 0.1 K/UL    ABS. IMM. GRANS. 0.1 (H) 0.00 - 0.04 K/UL    DF AUTOMATED     METABOLIC PANEL, COMPREHENSIVE    Collection Time: 02/28/22  1:39 PM   Result Value Ref Range    Sodium 142 136 - 145 mmol/L    Potassium 4.8 3.5 - 5.5 mmol/L    Chloride 109 100 - 111 mmol/L    CO2 27 21 - 32 mmol/L    Anion gap 6 3.0 - 18 mmol/L    Glucose 127 (H) 74 - 99 mg/dL    BUN 52 (H) 7.0 - 18 MG/DL    Creatinine 2.38 (H) 0.6 - 1.3 MG/DL    BUN/Creatinine ratio 22 (H) 12 - 20      GFR est AA 37 (L) >60 ml/min/1.73m2    GFR est non-AA 31 (L) >60 ml/min/1.73m2    Calcium 8.5 8.5 - 10.1 MG/DL    Bilirubin, total 0.4 0.2 - 1.0 MG/DL    ALT (SGPT) 37 16 - 61 U/L    AST (SGOT) 17 10 - 38 U/L    Alk.  phosphatase 74 45 - 117 U/L    Protein, total 6.7 6.4 - 8.2 g/dL    Albumin 3.3 (L) 3.4 - 5.0 g/dL    Globulin 3.4 2.0 - 4.0 g/dL    A-G Ratio 1.0 0.8 - 1.7         Assessment:     Morbid obesity with comorbidity    Plan:     laparoscopic sleeve gastrectomy    This is a 39 y.o. male with a BMI of Body mass index is 49.79 kg/m². and the weight-related co-morbidties as noted above. Francesca Yuen meets the NIH criteria for bariatric surgery based upon the BMI of Body mass index is 49.79 kg/m². and multiple weight-related co-morbidties. Francesca Yuen has elected laparoscopic sleeve gastrectomy as his intervention of choice for treatment of morbid obestiy through surgical means secondary to its safety profile, rapid return to work  and decreases in operative risks over gastric bypass. In the office today, following Arie's history and physical examination, a 40 minute discussion regarding the anatomic alterations for the laparoscopic sleeve gastrectomy was undertaken. The dietary expectations and the patient  dependent factors for success were thoroughly discussed, to include the need for interval follow-up and long-term dietary changes associated with success. The possible complications of the sleeve gastrectomy  were also discussed, to include;death, DVT/PE, staple line leak, bleeding, stricture formation, infection, nutritional deficiencies and sleeve dilation. Specific weight related outcomes for success were also discussed with an emphasis on careful and close follow-up with the first year and eating behavior modification as the baseline and cyclical hunger return. The patient expressed an understanding of the above factors, and his questions were answered in their entirety. In addition, the patient attended a 1.5 hour power point seminar regarding obesity, surgical weight loss including, adjustable gastric band, gastric bypass, and sleeve gastrectomy. This discussion contrasted the different surgical techniques, mechanisms of actions and expected outcomes, and surgical and medical risks associated with each procedure.   During this seminar, there was a long question and answer session where each questions was answered until there were no additional questions. Today, the patient had all of his questions answered and the decision was made today that the patient's preoperative evaluation is acceptable for them  to proceed with bariatric surgery  choosing the sleeve gastrectomy as his surgical option. The patient understands the plan of action    He has been diagnosed with sleep apnea but has not yet obtained his c-pap    There has been no exposure to nicotine in any form    They have gained 5 lbs since their consult visit    Pre-op UGI was normal     They were not asked to obtain any special clearances prior to proceeding to surgery    He completed his H. Pylori treatment     Cr- is 2.38    His post-op meds have been sent to his pharmacy     Secondary Diagnoses:     DVT / Pulmonary Embolus Risk - The patient is at a higher risk for post operative DVT / pulmonary embolus secondary to their morbid obese status, relative sedentary lifestyle, and impending general anesthetic. We will plan to use anticoagulation therapy pre and post operative as well as  pneumatic compression devices and encourage ambulation once on the hospital nursing floor. The need for possible at home anticoagulation therapy has also been discussed and any decision on this matter will be made during post operative evaluations. The patient understands that their efforts at ambulation are of vital importance to reduce the risk of this complication thus placing significant burden on them as to the prevention of such issues. Signs and symptoms of DVT / PE have been discussed with the patient and they have been instructed to call the office if any these occur in the \"at home\" post op phase    Adult Onset Diabetes - The patient has sarah given a very low carbohydrate diet preoperatively along with instructions to monitor their blood sugars on a regular daily basis.  When  their surgery is performed  we will be monitoring the patient with sliding scale insulin and accuchecks.  Based on those values we will determine whether the patient needs a reduction of those medications postoperatively or total removal of those medications on discharge.  We will have the patient continue accuchecks postoperatively while at home also and report to me or their family physician for appropriate adjustments as needed.  The patient also understands that in the event of uncontrolled blood sugar preoperatively that we may choose to postpone their surgery. Hypertension - The patient has a clear understanding of how weight loss improves hypertension as a whole, but also they understand that there is a significant genetic component to this disease process. We will monitor the patients blood pressure while in the hospital and the plan would be to continue those medications postoperatively.  If a diuretic is being used we will stop them on discharge to prevent dehydration particularly with the sleeve gastrectomy and the gastric bypass procedures.  They will be instructed to monitor their blood pressure postoperatively while at home and notify their primary care physician in the event of any significantly high or uncharacteristic readings. Hyperlipidemia - The patient understands that studies show that almost all patient will realize an improvement in their lipid profile with weight loss that occurs with these procedures. They however also understand that hyperlipidemia is a multifactorial disease particularly as it pertains to their genetic background and that there is no guarantee toward cure  of this issue.  We will resume their medications immediately postoperatively as this tends to decrease any post operative cardiac events.  The patient will follow up with their family physician in the postoperative period with plans to repeat their lipid panel 2-3 month postoperative for potential adjustment or removal of these medications. Obstructive Sleep Apnea -The patient understands the association of sleep apnea and obesity and the additional risk that it caries related to post surgical complications. We will have the patient bring their CPAP machine to the hospital for use both postoperatively in the PACU and on the floor at its appropriate setting.  We will have them continue using it while at home after surgery and follow up with their pulmonologist 6 months after to be retested to see if it can be discontinued at that time period.     Signed By: Cory Fried MD     February 28, 2022

## 2022-02-28 NOTE — PROGRESS NOTES
CLINICAL NUTRITION PRE-OPERATIVE EDUCATION    Patient's Name: Gaston Smith   Age: 39 y.o. YOB: 1985   Sex: male    Education & Materials Provided - Post-op Binder to include:  · Liquid Diet Shopping List   · Supplemental Resource Guide: MVI, Vitamin B12, Calcium Citrate, Vitamin D, Vitamin B50, and Iron recommendations  · Protein Supplement Information   · Fluid Requirements/No Straws  · No Caffeine or Carbonation   · No Alcohol                               · No Snacks or No Concentrated Sweets                                   · Exercise Guidelines   · Key Diet Principles                            · Addressed Current Habits/Changes to Make   · Patient was instructed on clear liquid diet guidelines to follow for one (1) day prior to surgery. · Patient has been educated on the liquid diet to begin day 2 post-op and continue for 2 weeks  · Patient understands the timeline for diet progression and the need to remain on full liquid diet until advanced by provider and dietitian. Summary:  Patient has completed the required visits with the Registered Dietitian. During these nutrition visits, we focused on dietary changes, behavior modifications, and the importance of establishing an exercise routine. The pre-op diet protocol that patient was prescribed emphasized low carbohydrate intake (less than 50 grams per day) and 60-80 grams (g) of protein per day. At today's session, patient was educated on the post-op diet and vitamin/mineral protocols. Patient understands the importance of keeping total fat and sugar intake to less than 3g per serving. Patient is aware of the the timeline for the different stages of the post-op diet and is aware that they will be on a modified full liquid diet for 2 weeks post-op. Patient understands that the body needs ~60-70 g/d protein. During the liquid diet phase, patient will need a minimum of 60 g/d protein from supplemental shakes.   Once eating soft protein-rich foods, patient will need 40-60 g/d protein from supplemental shakes to meet the total daily intake goal of  g/d protein. Patient understands the importance of being compliant with the diet and vitamin/mineral protocols, as well as the complications and risks that can occur if they are non-compliant. Patient has also been educated on the pre-op clear liquid diet protocol for 1 day prior to surgery. Patient understands that failure to comply with following the pre-op clear liquid diet could result in surgery being canceled. Patient's virtual appointment for 2 week post-op nutrition education has been scheduled. At this 2 week post-op visit, RD will assess how patient is tolerating liquids. If patient is tolerating liquid diet without difficulty, RD will recommend advancement of patient's diet to soft/moist (puree) diet to provider. Patient will also see RD again at 1-month post-op to assess tolerance of soft/moist diet and advance to soft protein with soft vegetables, if soft/moist diet is tolerated without difficulty. RD will assess patient's compliance with current protocol, including diet, vitamins/minerals, protein shakes, and physical activity. Post-op diet guidelines will be reinforced. Patient provided with RD contact information, and RD is available for questions and to meet with patient outside of the 2 week and 1 month post-op visit.      Candidate for surgery: Yes     Carine Joshua RD

## 2022-03-01 ENCOUNTER — PATIENT MESSAGE (OUTPATIENT)
Dept: SURGERY | Age: 37
End: 2022-03-01

## 2022-03-01 LAB
ATRIAL RATE: 88 BPM
CALCULATED P AXIS, ECG09: 65 DEGREES
CALCULATED R AXIS, ECG10: -6 DEGREES
CALCULATED T AXIS, ECG11: 13 DEGREES
DIAGNOSIS, 93000: NORMAL
P-R INTERVAL, ECG05: 164 MS
Q-T INTERVAL, ECG07: 372 MS
QRS DURATION, ECG06: 96 MS
QTC CALCULATION (BEZET), ECG08: 450 MS
VENTRICULAR RATE, ECG03: 88 BPM

## 2022-03-01 RX ORDER — ENOXAPARIN SODIUM 100 MG/ML
40 INJECTION SUBCUTANEOUS EVERY 12 HOURS
Qty: 20 EACH | Refills: 0 | Status: SHIPPED | OUTPATIENT
Start: 2022-03-01 | End: 2022-03-03

## 2022-03-01 RX ORDER — ONDANSETRON 8 MG/1
8 TABLET, ORALLY DISINTEGRATING ORAL
Qty: 60 TABLET | Refills: 0 | Status: SHIPPED | OUTPATIENT
Start: 2022-03-01 | End: 2022-03-03

## 2022-03-01 NOTE — TELEPHONE ENCOUNTER
Patient requested the Zofran and Lovenox be sent to [facility] in [place], as the previously requested pharmacy does not accept his insurance. RN completed and notified patient; he verbalized understanding.

## 2022-03-03 ENCOUNTER — HOSPITAL ENCOUNTER (OUTPATIENT)
Dept: PREADMISSION TESTING | Age: 37
Discharge: HOME | End: 2022-03-03

## 2022-03-03 VITALS — WEIGHT: 308 LBS | HEIGHT: 66 IN | BODY MASS INDEX: 49.5 KG/M2

## 2022-03-03 RX ORDER — ACETAMINOPHEN 500 MG
1000 TABLET ORAL ONCE
Status: CANCELLED | OUTPATIENT
Start: 2022-03-03 | End: 2022-03-03

## 2022-03-03 RX ORDER — NYSTATIN 100000 [USP'U]/ML
500000 SUSPENSION ORAL
Status: CANCELLED | OUTPATIENT
Start: 2022-03-03 | End: 2022-03-03

## 2022-03-03 RX ORDER — FAMOTIDINE 20 MG/50ML
20 INJECTION, SOLUTION INTRAVENOUS
Status: CANCELLED | OUTPATIENT
Start: 2022-03-03 | End: 2022-03-04

## 2022-03-03 RX ORDER — SODIUM CHLORIDE, SODIUM LACTATE, POTASSIUM CHLORIDE, CALCIUM CHLORIDE 600; 310; 30; 20 MG/100ML; MG/100ML; MG/100ML; MG/100ML
125 INJECTION, SOLUTION INTRAVENOUS CONTINUOUS
Status: CANCELLED | OUTPATIENT
Start: 2022-03-03

## 2022-03-03 NOTE — PERIOP NOTES
Dr Canelo Sullivan office called, left message on Eve's voicemail re: The use of Ancef in patient with stage 3 kidney disease.

## 2022-03-03 NOTE — PERIOP NOTES
Operative consent filled out according to MD order on surgical posting, verbatim. No DNR. Patient instructed as part of pre-op teaching not to bring any valuables including Wallet, jewelry, cell phone, lap top or tablet. Patient also instructed not to wear anything on skin including deodorant, cologne, creams or sprays. Patient confirmed receipt of CHG skin preparation and instructions. Patient is aware of one visitor policy in surgical pavilion.

## 2022-03-07 ENCOUNTER — APPOINTMENT (OUTPATIENT)
Dept: INTERNAL MEDICINE CLINIC | Age: 37
End: 2022-03-07

## 2022-03-08 LAB
ALBUMIN SERPL-MCNC: 3.8 G/DL (ref 4–5)
ALBUMIN/CREAT UR: 3201 MG/G CREAT (ref 0–29)
ALBUMIN/GLOB SERPL: 1.5 {RATIO} (ref 1.2–2.2)
ALP SERPL-CCNC: 73 IU/L (ref 44–121)
ALT SERPL-CCNC: 24 IU/L (ref 0–44)
AST SERPL-CCNC: 20 IU/L (ref 0–40)
BASOPHILS # BLD AUTO: 0 X10E3/UL (ref 0–0.2)
BASOPHILS NFR BLD AUTO: 0 %
BILIRUB SERPL-MCNC: 0.3 MG/DL (ref 0–1.2)
BUN SERPL-MCNC: 50 MG/DL (ref 6–20)
BUN/CREAT SERPL: 25 (ref 9–20)
CALCIUM SERPL-MCNC: 9.2 MG/DL (ref 8.7–10.2)
CHLORIDE SERPL-SCNC: 109 MMOL/L (ref 96–106)
CHOLEST SERPL-MCNC: 145 MG/DL (ref 100–199)
CO2 SERPL-SCNC: 16 MMOL/L (ref 20–29)
CREAT SERPL-MCNC: 2.04 MG/DL (ref 0.76–1.27)
CREAT UR-MCNC: 51.5 MG/DL
EGFR: 42 ML/MIN/1.73
EOSINOPHIL # BLD AUTO: 0.4 X10E3/UL (ref 0–0.4)
EOSINOPHIL NFR BLD AUTO: 5 %
ERYTHROCYTE [DISTWIDTH] IN BLOOD BY AUTOMATED COUNT: 13.2 % (ref 11.6–15.4)
EST. AVERAGE GLUCOSE BLD GHB EST-MCNC: 108 MG/DL
FERRITIN SERPL-MCNC: 138 NG/ML (ref 30–400)
GLOBULIN SER CALC-MCNC: 2.6 G/DL (ref 1.5–4.5)
GLUCOSE SERPL-MCNC: 126 MG/DL (ref 65–99)
HBA1C MFR BLD: 5.4 % (ref 4.8–5.6)
HCT VFR BLD AUTO: 38.2 % (ref 37.5–51)
HDLC SERPL-MCNC: 38 MG/DL
HGB BLD-MCNC: 12.1 G/DL (ref 13–17.7)
IMM GRANULOCYTES # BLD AUTO: 0 X10E3/UL (ref 0–0.1)
IMM GRANULOCYTES NFR BLD AUTO: 0 %
IMP & REVIEW OF LAB RESULTS: NORMAL
IRON SATN MFR SERPL: 34 % (ref 15–55)
IRON SERPL-MCNC: 82 UG/DL (ref 38–169)
LDLC SERPL CALC-MCNC: 80 MG/DL (ref 0–99)
LYMPHOCYTES # BLD AUTO: 1.9 X10E3/UL (ref 0.7–3.1)
LYMPHOCYTES NFR BLD AUTO: 26 %
MCH RBC QN AUTO: 26.4 PG (ref 26.6–33)
MCHC RBC AUTO-ENTMCNC: 31.7 G/DL (ref 31.5–35.7)
MCV RBC AUTO: 83 FL (ref 79–97)
MICROALBUMIN UR-MCNC: 1648.5 UG/ML
MONOCYTES # BLD AUTO: 0.5 X10E3/UL (ref 0.1–0.9)
MONOCYTES NFR BLD AUTO: 6 %
NEUTROPHILS # BLD AUTO: 4.7 X10E3/UL (ref 1.4–7)
NEUTROPHILS NFR BLD AUTO: 63 %
PLATELET # BLD AUTO: 240 X10E3/UL (ref 150–450)
POTASSIUM SERPL-SCNC: 5.6 MMOL/L (ref 3.5–5.2)
PROT SERPL-MCNC: 6.4 G/DL (ref 6–8.5)
RBC # BLD AUTO: 4.59 X10E6/UL (ref 4.14–5.8)
SODIUM SERPL-SCNC: 140 MMOL/L (ref 134–144)
TIBC SERPL-MCNC: 240 UG/DL (ref 250–450)
TRIGL SERPL-MCNC: 157 MG/DL (ref 0–149)
UIBC SERPL-MCNC: 158 UG/DL (ref 111–343)
VLDLC SERPL CALC-MCNC: 27 MG/DL (ref 5–40)
WBC # BLD AUTO: 7.5 X10E3/UL (ref 3.4–10.8)

## 2022-03-09 ENCOUNTER — ANESTHESIA EVENT (OUTPATIENT)
Dept: SURGERY | Age: 37
DRG: 403 | End: 2022-03-09
Payer: MEDICAID

## 2022-03-09 DIAGNOSIS — N18.32 STAGE 3B CHRONIC KIDNEY DISEASE (HCC): ICD-10-CM

## 2022-03-09 DIAGNOSIS — E87.5 HYPERKALEMIA: Primary | ICD-10-CM

## 2022-03-09 DIAGNOSIS — E87.5 HYPERKALEMIA: ICD-10-CM

## 2022-03-09 NOTE — PROGRESS NOTES
I will discuss his results with him at his upcoming appointment. His creatinine is 2.04. BUN is 50. In September of last year, his creatinine was 2.35 and his BUN was 50. Meanwhile, his potassium is 5.6. His albumin is mildly low at 3.8. AST and ALT are normal.  His total cholesterol is 145. Triglycerides are 157. HDL is 38. LDL is 80. Overall, his cholesterol has slightly improved. His microalbuminuria has worsened. His A1c is 5.4, better than it was in November at which time it was 6.3. His CBC shows that his hemoglobin is mildly low at 12.1. His ferritin is 138. His iron studies are unremarkable except for a TIBC of 240. Please have him get a repeat renal panel given his elevated potassium today.      Dr. Thong Abbott  [facility] [address]  Phone: [phone]  Fax: [phone]

## 2022-03-10 ENCOUNTER — ANESTHESIA (OUTPATIENT)
Dept: SURGERY | Age: 37
DRG: 403 | End: 2022-03-10
Payer: MEDICAID

## 2022-03-10 ENCOUNTER — APPOINTMENT (OUTPATIENT)
Dept: SURGERY | Age: 37
End: 2022-03-10

## 2022-03-10 ENCOUNTER — HOSPITAL ENCOUNTER (INPATIENT)
Age: 37
LOS: 1 days | Discharge: HOME | DRG: 403 | End: 2022-03-11
Attending: SPECIALIST | Admitting: SPECIALIST
Payer: MEDICAID

## 2022-03-10 DIAGNOSIS — G89.18 POST-OP PAIN: Primary | ICD-10-CM

## 2022-03-10 DIAGNOSIS — E66.01 MORBID OBESITY WITH BMI OF 45.0-49.9, ADULT (HCC): ICD-10-CM

## 2022-03-10 LAB
GLUCOSE BLD STRIP.AUTO-MCNC: 103 MG/DL (ref 70–110)
GLUCOSE BLD STRIP.AUTO-MCNC: 118 MG/DL (ref 70–110)
GLUCOSE BLD STRIP.AUTO-MCNC: 126 MG/DL (ref 70–110)
POTASSIUM SERPL-SCNC: 4.6 MMOL/L (ref 3.5–5.5)

## 2022-03-10 PROCEDURE — 77030010030: Performed by: SPECIALIST

## 2022-03-10 PROCEDURE — 77030010515 HC APPL ENDOCLP LIG J&J -B: Performed by: SPECIALIST

## 2022-03-10 PROCEDURE — 43239 EGD BIOPSY SINGLE/MULTIPLE: CPT | Performed by: SPECIALIST

## 2022-03-10 PROCEDURE — 77030008477 HC STYL SATN SLP COVD -A: Performed by: ANESTHESIOLOGY

## 2022-03-10 PROCEDURE — 76010000149 HC OR TIME 1 TO 1.5 HR: Performed by: SPECIALIST

## 2022-03-10 PROCEDURE — 74011250636 HC RX REV CODE- 250/636: Performed by: SPECIALIST

## 2022-03-10 PROCEDURE — 74011250636 HC RX REV CODE- 250/636: Performed by: ANESTHESIOLOGY

## 2022-03-10 PROCEDURE — 36415 COLL VENOUS BLD VENIPUNCTURE: CPT

## 2022-03-10 PROCEDURE — 0FB23ZX EXCISION OF LEFT LOBE LIVER, PERCUTANEOUS APPROACH, DIAGNOSTIC: ICD-10-PCS | Performed by: SPECIALIST

## 2022-03-10 PROCEDURE — 88305 TISSUE EXAM BY PATHOLOGIST: CPT

## 2022-03-10 PROCEDURE — 77010033678 HC OXYGEN DAILY

## 2022-03-10 PROCEDURE — 77030002933 HC SUT MCRYL J&J -A: Performed by: SPECIALIST

## 2022-03-10 PROCEDURE — 77030006643: Performed by: ANESTHESIOLOGY

## 2022-03-10 PROCEDURE — 77030016151 HC PROTCTR LNS DFOG COVD -B: Performed by: SPECIALIST

## 2022-03-10 PROCEDURE — 77030027876 HC STPLR ENDOSC FLX PWR J&J -G1: Performed by: SPECIALIST

## 2022-03-10 PROCEDURE — 77030008574 HC TBNG SUC IRR STRY -B: Performed by: SPECIALIST

## 2022-03-10 PROCEDURE — 88313 SPECIAL STAINS GROUP 2: CPT

## 2022-03-10 PROCEDURE — 77030008602 HC TRCR ENDOSC EPATH J&J -B: Performed by: SPECIALIST

## 2022-03-10 PROCEDURE — 77030039961 HC KT CUST COLON BSC -D: Performed by: SPECIALIST

## 2022-03-10 PROCEDURE — 77030008603 HC TRCR ENDOSC EPATH J&J -C: Performed by: SPECIALIST

## 2022-03-10 PROCEDURE — 77030008683 HC TU ET CUF COVD -A: Performed by: ANESTHESIOLOGY

## 2022-03-10 PROCEDURE — 77030009968 HC RELD STPLR ENDOSC J&J -D: Performed by: SPECIALIST

## 2022-03-10 PROCEDURE — 65270000029 HC RM PRIVATE

## 2022-03-10 PROCEDURE — 77030040361 HC SLV COMPR DVT MDII -B: Performed by: SPECIALIST

## 2022-03-10 PROCEDURE — 77030033200 HC PRT CLSR CRTR THOMP COOP -C: Performed by: SPECIALIST

## 2022-03-10 PROCEDURE — 76060000033 HC ANESTHESIA 1 TO 1.5 HR: Performed by: SPECIALIST

## 2022-03-10 PROCEDURE — 77030014008 HC SPNG HEMSTAT J&J -C: Performed by: SPECIALIST

## 2022-03-10 PROCEDURE — 0DB64Z3 EXCISION OF STOMACH, PERCUTANEOUS ENDOSCOPIC APPROACH, VERTICAL: ICD-10-PCS | Performed by: SPECIALIST

## 2022-03-10 PROCEDURE — 77030034154 HC SHR COAG HARM ACE J&J -F: Performed by: SPECIALIST

## 2022-03-10 PROCEDURE — 77030002966 HC SUT PDS J&J -A: Performed by: SPECIALIST

## 2022-03-10 PROCEDURE — 82962 GLUCOSE BLOOD TEST: CPT

## 2022-03-10 PROCEDURE — 77030008518 HC TBNG INSUF ENDO STRY -B: Performed by: SPECIALIST

## 2022-03-10 PROCEDURE — 77030040506 HC DRN WND MDII -A: Performed by: SPECIALIST

## 2022-03-10 PROCEDURE — 0DB68ZX EXCISION OF STOMACH, VIA NATURAL OR ARTIFICIAL OPENING ENDOSCOPIC, DIAGNOSTIC: ICD-10-PCS | Performed by: SPECIALIST

## 2022-03-10 PROCEDURE — 77030041460 HC APL VISTASEAL J&J -B: Performed by: SPECIALIST

## 2022-03-10 PROCEDURE — 43775 LAP SLEEVE GASTRECTOMY: CPT | Performed by: SPECIALIST

## 2022-03-10 PROCEDURE — 74011000250 HC RX REV CODE- 250: Performed by: ANESTHESIOLOGY

## 2022-03-10 PROCEDURE — 88307 TISSUE EXAM BY PATHOLOGIST: CPT

## 2022-03-10 PROCEDURE — 77030003580 HC NDL INSUF VERES J&J -B: Performed by: SPECIALIST

## 2022-03-10 PROCEDURE — 2709999900 HC NON-CHARGEABLE SUPPLY: Performed by: SPECIALIST

## 2022-03-10 PROCEDURE — 77030002916 HC SUT ETHLN J&J -A: Performed by: SPECIALIST

## 2022-03-10 PROCEDURE — 77030002912 HC SUT ETHBND J&J -A: Performed by: SPECIALIST

## 2022-03-10 PROCEDURE — 74011000250 HC RX REV CODE- 250: Performed by: SPECIALIST

## 2022-03-10 PROCEDURE — 77030020782 HC GWN BAIR PAWS FLX 3M -B: Performed by: SPECIALIST

## 2022-03-10 PROCEDURE — 74011250637 HC RX REV CODE- 250/637: Performed by: SPECIALIST

## 2022-03-10 PROCEDURE — 77030033271 HC TRCR ENDOSC EPATH2 J&J -B: Performed by: SPECIALIST

## 2022-03-10 PROCEDURE — 47379 UNLISTED LAPS PX LIVER: CPT | Performed by: SPECIALIST

## 2022-03-10 PROCEDURE — 77030041458 HC SEALNT FIBRN VISTASEAL J&J -G: Performed by: SPECIALIST

## 2022-03-10 PROCEDURE — 77030011808 HC STPLR ENDOSCOPIC J&J -D: Performed by: SPECIALIST

## 2022-03-10 PROCEDURE — 76210000016 HC OR PH I REC 1 TO 1.5 HR: Performed by: SPECIALIST

## 2022-03-10 PROCEDURE — 77030034029 HC SLV GASTRCTMY CAL SYS DISP BOEH -C: Performed by: SPECIALIST

## 2022-03-10 PROCEDURE — 77030009426 HC FCPS BIOP ENDOSC BSC -B: Performed by: SPECIALIST

## 2022-03-10 PROCEDURE — 84132 ASSAY OF SERUM POTASSIUM: CPT

## 2022-03-10 RX ORDER — CEFAZOLIN SODIUM/WATER 2 G/20 ML
2 SYRINGE (ML) INTRAVENOUS ONCE
Status: COMPLETED | OUTPATIENT
Start: 2022-03-10 | End: 2022-03-10

## 2022-03-10 RX ORDER — MAGNESIUM SULFATE 100 %
4 CRYSTALS MISCELLANEOUS AS NEEDED
Status: DISCONTINUED | OUTPATIENT
Start: 2022-03-10 | End: 2022-03-10 | Stop reason: HOSPADM

## 2022-03-10 RX ORDER — ACETAMINOPHEN 500 MG
1000 TABLET ORAL ONCE
Status: COMPLETED | OUTPATIENT
Start: 2022-03-10 | End: 2022-03-10

## 2022-03-10 RX ORDER — HYDROMORPHONE HYDROCHLORIDE 2 MG/ML
INJECTION, SOLUTION INTRAMUSCULAR; INTRAVENOUS; SUBCUTANEOUS AS NEEDED
Status: DISCONTINUED | OUTPATIENT
Start: 2022-03-10 | End: 2022-03-10 | Stop reason: HOSPADM

## 2022-03-10 RX ORDER — NYSTATIN 100000 [USP'U]/ML
500000 SUSPENSION ORAL
Status: COMPLETED | OUTPATIENT
Start: 2022-03-10 | End: 2022-03-10

## 2022-03-10 RX ORDER — MORPHINE SULFATE 4 MG/ML
8 INJECTION INTRAVENOUS
Status: DISCONTINUED | OUTPATIENT
Start: 2022-03-10 | End: 2022-03-11

## 2022-03-10 RX ORDER — ENOXAPARIN SODIUM 100 MG/ML
40 INJECTION SUBCUTANEOUS EVERY 12 HOURS
Status: DISCONTINUED | OUTPATIENT
Start: 2022-03-10 | End: 2022-03-11 | Stop reason: HOSPADM

## 2022-03-10 RX ORDER — PROPOFOL 10 MG/ML
INJECTION, EMULSION INTRAVENOUS AS NEEDED
Status: DISCONTINUED | OUTPATIENT
Start: 2022-03-10 | End: 2022-03-10 | Stop reason: HOSPADM

## 2022-03-10 RX ORDER — SODIUM CHLORIDE, SODIUM LACTATE, POTASSIUM CHLORIDE, CALCIUM CHLORIDE 600; 310; 30; 20 MG/100ML; MG/100ML; MG/100ML; MG/100ML
125 INJECTION, SOLUTION INTRAVENOUS CONTINUOUS
Status: DISCONTINUED | OUTPATIENT
Start: 2022-03-10 | End: 2022-03-10

## 2022-03-10 RX ORDER — SODIUM CHLORIDE, SODIUM LACTATE, POTASSIUM CHLORIDE, CALCIUM CHLORIDE 600; 310; 30; 20 MG/100ML; MG/100ML; MG/100ML; MG/100ML
150 INJECTION, SOLUTION INTRAVENOUS CONTINUOUS
Status: DISCONTINUED | OUTPATIENT
Start: 2022-03-10 | End: 2022-03-11 | Stop reason: HOSPADM

## 2022-03-10 RX ORDER — NALOXONE HYDROCHLORIDE 0.4 MG/ML
0.4 INJECTION, SOLUTION INTRAMUSCULAR; INTRAVENOUS; SUBCUTANEOUS AS NEEDED
Status: DISCONTINUED | OUTPATIENT
Start: 2022-03-10 | End: 2022-03-11 | Stop reason: HOSPADM

## 2022-03-10 RX ORDER — SODIUM CHLORIDE 0.9 % (FLUSH) 0.9 %
5-40 SYRINGE (ML) INJECTION EVERY 8 HOURS
Status: DISCONTINUED | OUTPATIENT
Start: 2022-03-10 | End: 2022-03-10 | Stop reason: HOSPADM

## 2022-03-10 RX ORDER — HYDROMORPHONE HYDROCHLORIDE 1 MG/ML
0.5 INJECTION, SOLUTION INTRAMUSCULAR; INTRAVENOUS; SUBCUTANEOUS
Status: DISCONTINUED | OUTPATIENT
Start: 2022-03-10 | End: 2022-03-10 | Stop reason: HOSPADM

## 2022-03-10 RX ORDER — SUCCINYLCHOLINE CHLORIDE 100 MG/5ML
SYRINGE (ML) INTRAVENOUS AS NEEDED
Status: DISCONTINUED | OUTPATIENT
Start: 2022-03-10 | End: 2022-03-10 | Stop reason: HOSPADM

## 2022-03-10 RX ORDER — DEXTROSE MONOHYDRATE 100 MG/ML
0-250 INJECTION, SOLUTION INTRAVENOUS AS NEEDED
Status: DISCONTINUED | OUTPATIENT
Start: 2022-03-10 | End: 2022-03-11 | Stop reason: HOSPADM

## 2022-03-10 RX ORDER — EPHEDRINE SULFATE/0.9% NACL/PF 50 MG/5 ML
SYRINGE (ML) INTRAVENOUS AS NEEDED
Status: DISCONTINUED | OUTPATIENT
Start: 2022-03-10 | End: 2022-03-10 | Stop reason: HOSPADM

## 2022-03-10 RX ORDER — FLUMAZENIL 0.1 MG/ML
0.2 INJECTION INTRAVENOUS
Status: DISCONTINUED | OUTPATIENT
Start: 2022-03-10 | End: 2022-03-10 | Stop reason: HOSPADM

## 2022-03-10 RX ORDER — INSULIN LISPRO 100 [IU]/ML
INJECTION, SOLUTION INTRAVENOUS; SUBCUTANEOUS ONCE
Status: DISCONTINUED | OUTPATIENT
Start: 2022-03-10 | End: 2022-03-10 | Stop reason: HOSPADM

## 2022-03-10 RX ORDER — NEOSTIGMINE METHYLSULFATE 1 MG/ML
INJECTION, SOLUTION INTRAVENOUS AS NEEDED
Status: DISCONTINUED | OUTPATIENT
Start: 2022-03-10 | End: 2022-03-10 | Stop reason: HOSPADM

## 2022-03-10 RX ORDER — LIDOCAINE HYDROCHLORIDE 20 MG/ML
INJECTION, SOLUTION EPIDURAL; INFILTRATION; INTRACAUDAL; PERINEURAL AS NEEDED
Status: DISCONTINUED | OUTPATIENT
Start: 2022-03-10 | End: 2022-03-10 | Stop reason: HOSPADM

## 2022-03-10 RX ORDER — ENOXAPARIN SODIUM 100 MG/ML
40 INJECTION SUBCUTANEOUS ONCE
Status: COMPLETED | OUTPATIENT
Start: 2022-03-10 | End: 2022-03-10

## 2022-03-10 RX ORDER — SODIUM CHLORIDE 0.9 % (FLUSH) 0.9 %
5-40 SYRINGE (ML) INJECTION AS NEEDED
Status: DISCONTINUED | OUTPATIENT
Start: 2022-03-10 | End: 2022-03-10 | Stop reason: HOSPADM

## 2022-03-10 RX ORDER — INSULIN LISPRO 100 [IU]/ML
INJECTION, SOLUTION INTRAVENOUS; SUBCUTANEOUS EVERY 6 HOURS
Status: DISCONTINUED | OUTPATIENT
Start: 2022-03-10 | End: 2022-03-11 | Stop reason: HOSPADM

## 2022-03-10 RX ORDER — ROCURONIUM BROMIDE 10 MG/ML
INJECTION, SOLUTION INTRAVENOUS AS NEEDED
Status: DISCONTINUED | OUTPATIENT
Start: 2022-03-10 | End: 2022-03-10 | Stop reason: HOSPADM

## 2022-03-10 RX ORDER — FENTANYL CITRATE 50 UG/ML
25 INJECTION, SOLUTION INTRAMUSCULAR; INTRAVENOUS AS NEEDED
Status: DISCONTINUED | OUTPATIENT
Start: 2022-03-10 | End: 2022-03-10 | Stop reason: HOSPADM

## 2022-03-10 RX ORDER — ONDANSETRON 2 MG/ML
4 INJECTION INTRAMUSCULAR; INTRAVENOUS
Status: DISCONTINUED | OUTPATIENT
Start: 2022-03-10 | End: 2022-03-11 | Stop reason: HOSPADM

## 2022-03-10 RX ORDER — NALOXONE HYDROCHLORIDE 0.4 MG/ML
0.1 INJECTION, SOLUTION INTRAMUSCULAR; INTRAVENOUS; SUBCUTANEOUS AS NEEDED
Status: DISCONTINUED | OUTPATIENT
Start: 2022-03-10 | End: 2022-03-10 | Stop reason: HOSPADM

## 2022-03-10 RX ORDER — FENTANYL CITRATE 50 UG/ML
INJECTION, SOLUTION INTRAMUSCULAR; INTRAVENOUS AS NEEDED
Status: DISCONTINUED | OUTPATIENT
Start: 2022-03-10 | End: 2022-03-10 | Stop reason: HOSPADM

## 2022-03-10 RX ORDER — GLYCOPYRROLATE 0.2 MG/ML
INJECTION INTRAMUSCULAR; INTRAVENOUS AS NEEDED
Status: DISCONTINUED | OUTPATIENT
Start: 2022-03-10 | End: 2022-03-10 | Stop reason: HOSPADM

## 2022-03-10 RX ORDER — ONDANSETRON 2 MG/ML
INJECTION INTRAMUSCULAR; INTRAVENOUS AS NEEDED
Status: DISCONTINUED | OUTPATIENT
Start: 2022-03-10 | End: 2022-03-10 | Stop reason: HOSPADM

## 2022-03-10 RX ORDER — METOCLOPRAMIDE HYDROCHLORIDE 5 MG/ML
5 INJECTION INTRAMUSCULAR; INTRAVENOUS EVERY 6 HOURS
Status: DISCONTINUED | OUTPATIENT
Start: 2022-03-10 | End: 2022-03-11 | Stop reason: HOSPADM

## 2022-03-10 RX ORDER — KETAMINE HYDROCHLORIDE 10 MG/ML
INJECTION, SOLUTION INTRAMUSCULAR; INTRAVENOUS AS NEEDED
Status: DISCONTINUED | OUTPATIENT
Start: 2022-03-10 | End: 2022-03-10 | Stop reason: HOSPADM

## 2022-03-10 RX ORDER — SODIUM CHLORIDE, SODIUM LACTATE, POTASSIUM CHLORIDE, CALCIUM CHLORIDE 600; 310; 30; 20 MG/100ML; MG/100ML; MG/100ML; MG/100ML
1000 INJECTION, SOLUTION INTRAVENOUS CONTINUOUS
Status: DISCONTINUED | OUTPATIENT
Start: 2022-03-10 | End: 2022-03-10 | Stop reason: HOSPADM

## 2022-03-10 RX ORDER — SODIUM CHLORIDE, SODIUM LACTATE, POTASSIUM CHLORIDE, AND CALCIUM CHLORIDE .6; .31; .03; .02 G/100ML; G/100ML; G/100ML; G/100ML
IRRIGANT IRRIGATION AS NEEDED
Status: DISCONTINUED | OUTPATIENT
Start: 2022-03-10 | End: 2022-03-10 | Stop reason: HOSPADM

## 2022-03-10 RX ORDER — FAMOTIDINE 20 MG/50ML
20 INJECTION, SOLUTION INTRAVENOUS
Status: DISCONTINUED | OUTPATIENT
Start: 2022-03-10 | End: 2022-03-10 | Stop reason: SDUPTHER

## 2022-03-10 RX ORDER — PHENYLEPHRINE HCL IN 0.9% NACL 1 MG/10 ML
SYRINGE (ML) INTRAVENOUS AS NEEDED
Status: DISCONTINUED | OUTPATIENT
Start: 2022-03-10 | End: 2022-03-10 | Stop reason: HOSPADM

## 2022-03-10 RX ORDER — MIDAZOLAM HYDROCHLORIDE 1 MG/ML
INJECTION, SOLUTION INTRAMUSCULAR; INTRAVENOUS AS NEEDED
Status: DISCONTINUED | OUTPATIENT
Start: 2022-03-10 | End: 2022-03-10 | Stop reason: HOSPADM

## 2022-03-10 RX ORDER — DEXTROSE MONOHYDRATE 100 MG/ML
0-250 INJECTION, SOLUTION INTRAVENOUS AS NEEDED
Status: DISCONTINUED | OUTPATIENT
Start: 2022-03-10 | End: 2022-03-10 | Stop reason: HOSPADM

## 2022-03-10 RX ADMIN — SODIUM CHLORIDE, POTASSIUM CHLORIDE, SODIUM LACTATE AND CALCIUM CHLORIDE 150 ML/HR: 600; 310; 30; 20 INJECTION, SOLUTION INTRAVENOUS at 14:15

## 2022-03-10 RX ADMIN — ROCURONIUM BROMIDE 10 MG: 10 INJECTION, SOLUTION INTRAVENOUS at 11:04

## 2022-03-10 RX ADMIN — MORPHINE SULFATE 8 MG: 4 INJECTION INTRAVENOUS at 21:06

## 2022-03-10 RX ADMIN — MIDAZOLAM 2 MG: 1 INJECTION INTRAMUSCULAR; INTRAVENOUS at 10:52

## 2022-03-10 RX ADMIN — ENOXAPARIN SODIUM 40 MG: 100 INJECTION SUBCUTANEOUS at 21:21

## 2022-03-10 RX ADMIN — Medication 100 MCG: at 11:34

## 2022-03-10 RX ADMIN — PROPOFOL 200 MG: 10 INJECTION, EMULSION INTRAVENOUS at 11:04

## 2022-03-10 RX ADMIN — SODIUM CHLORIDE, SODIUM LACTATE, POTASSIUM CHLORIDE, AND CALCIUM CHLORIDE 125 ML/HR: 600; 310; 30; 20 INJECTION, SOLUTION INTRAVENOUS at 09:45

## 2022-03-10 RX ADMIN — KETAMINE HYDROCHLORIDE 20 MG: 10 INJECTION, SOLUTION INTRAMUSCULAR; INTRAVENOUS at 11:01

## 2022-03-10 RX ADMIN — Medication 100 MCG: at 11:30

## 2022-03-10 RX ADMIN — HYDROMORPHONE HYDROCHLORIDE 0.5 MG: 2 INJECTION, SOLUTION INTRAMUSCULAR; INTRAVENOUS; SUBCUTANEOUS at 11:52

## 2022-03-10 RX ADMIN — Medication 12.5 MG: at 11:39

## 2022-03-10 RX ADMIN — Medication 50 MCG: at 11:20

## 2022-03-10 RX ADMIN — HYDROMORPHONE HYDROCHLORIDE 0.5 MG: 1 INJECTION, SOLUTION INTRAMUSCULAR; INTRAVENOUS; SUBCUTANEOUS at 12:45

## 2022-03-10 RX ADMIN — Medication 10 MG: at 12:00

## 2022-03-10 RX ADMIN — ACETAMINOPHEN 1000 MG: 500 TABLET ORAL at 09:46

## 2022-03-10 RX ADMIN — FENTANYL CITRATE 25 MCG: 50 INJECTION, SOLUTION INTRAMUSCULAR; INTRAVENOUS at 12:52

## 2022-03-10 RX ADMIN — Medication 50 MCG: at 11:27

## 2022-03-10 RX ADMIN — FENTANYL CITRATE 100 MCG: 50 INJECTION, SOLUTION INTRAMUSCULAR; INTRAVENOUS at 11:04

## 2022-03-10 RX ADMIN — Medication 3 MG: at 12:17

## 2022-03-10 RX ADMIN — KETAMINE HYDROCHLORIDE 10 MG: 10 INJECTION, SOLUTION INTRAMUSCULAR; INTRAVENOUS at 11:11

## 2022-03-10 RX ADMIN — METOCLOPRAMIDE HYDROCHLORIDE 5 MG: 5 INJECTION INTRAMUSCULAR; INTRAVENOUS at 21:05

## 2022-03-10 RX ADMIN — Medication 10 MG: at 11:34

## 2022-03-10 RX ADMIN — SODIUM CHLORIDE, SODIUM LACTATE, POTASSIUM CHLORIDE, AND CALCIUM CHLORIDE: 600; 310; 30; 20 INJECTION, SOLUTION INTRAVENOUS at 12:13

## 2022-03-10 RX ADMIN — HYDROMORPHONE HYDROCHLORIDE 0.5 MG: 1 INJECTION, SOLUTION INTRAMUSCULAR; INTRAVENOUS; SUBCUTANEOUS at 12:33

## 2022-03-10 RX ADMIN — Medication 2 G: at 11:02

## 2022-03-10 RX ADMIN — GLYCOPYRROLATE 0.2 MG: 0.2 INJECTION INTRAMUSCULAR; INTRAVENOUS at 10:52

## 2022-03-10 RX ADMIN — Medication 100 MCG: at 11:23

## 2022-03-10 RX ADMIN — MORPHINE SULFATE 8 MG: 4 INJECTION INTRAVENOUS at 14:13

## 2022-03-10 RX ADMIN — METOCLOPRAMIDE HYDROCHLORIDE 5 MG: 5 INJECTION INTRAMUSCULAR; INTRAVENOUS at 14:11

## 2022-03-10 RX ADMIN — FENTANYL CITRATE 25 MCG: 50 INJECTION, SOLUTION INTRAMUSCULAR; INTRAVENOUS at 13:11

## 2022-03-10 RX ADMIN — FENTANYL CITRATE 25 MCG: 50 INJECTION, SOLUTION INTRAMUSCULAR; INTRAVENOUS at 13:02

## 2022-03-10 RX ADMIN — LIDOCAINE HYDROCHLORIDE 100 MG: 20 INJECTION, SOLUTION INTRAVENOUS at 11:04

## 2022-03-10 RX ADMIN — NYSTATIN 500000 UNITS: 100000 SUSPENSION ORAL at 09:46

## 2022-03-10 RX ADMIN — GLYCOPYRROLATE 0.4 MG: 0.2 INJECTION INTRAMUSCULAR; INTRAVENOUS at 12:17

## 2022-03-10 RX ADMIN — ROCURONIUM BROMIDE 30 MG: 10 INJECTION, SOLUTION INTRAVENOUS at 11:11

## 2022-03-10 RX ADMIN — Medication 160 MG: at 11:04

## 2022-03-10 RX ADMIN — SODIUM CHLORIDE, SODIUM LACTATE, POTASSIUM CHLORIDE, AND CALCIUM CHLORIDE: 600; 310; 30; 20 INJECTION, SOLUTION INTRAVENOUS at 11:19

## 2022-03-10 RX ADMIN — FAMOTIDINE 20 MG: 10 INJECTION, SOLUTION INTRAVENOUS at 09:46

## 2022-03-10 RX ADMIN — HYDROMORPHONE HYDROCHLORIDE 0.5 MG: 2 INJECTION, SOLUTION INTRAMUSCULAR; INTRAVENOUS; SUBCUTANEOUS at 11:48

## 2022-03-10 RX ADMIN — ONDANSETRON HYDROCHLORIDE 4 MG: 2 INJECTION INTRAMUSCULAR; INTRAVENOUS at 12:17

## 2022-03-10 RX ADMIN — ENOXAPARIN SODIUM 40 MG: 100 INJECTION SUBCUTANEOUS at 09:45

## 2022-03-10 RX ADMIN — MORPHINE SULFATE 8 MG: 4 INJECTION INTRAVENOUS at 17:19

## 2022-03-10 NOTE — PERIOP NOTES
TRANSFER - OUT REPORT:    Verbal report given to [facility] RN on [address]  being transferred to  for routine progression of care       Report consisted of patients Situation, Background, Assessment and   Recommendations(SBAR). Information from the following report(s) SBAR, Kardex, Intake/Output, MAR and Cardiac Rhythm NSR was reviewed with the receiving nurse. Lines:   Peripheral IV 03/10/22 Posterior;Right Hand (Active)   Site Assessment Clean, dry, & intact 03/10/22 1250   Phlebitis Assessment 0 03/10/22 1250   Infiltration Assessment 0 03/10/22 1250   Dressing Status Clean, dry, & intact 03/10/22 1250   Dressing Type Tape;Transparent 03/10/22 1250   Hub Color/Line Status Infusing;Patent;Green 03/10/22 1250   Alcohol Cap Used No 03/10/22 0944        Opportunity for questions and clarification was provided.       Patient transported with:   O2 @ 3 liters  Registered Nurse        Intake/Output Summary (Last 24 hours) at 3/10/2022 1337  Last data filed at 3/10/2022 1336  Gross per 24 hour   Intake 2500 ml   Output --   Net 2500 ml

## 2022-03-10 NOTE — ANESTHESIA PREPROCEDURE EVALUATION
Relevant Problems   No relevant active problems       Anesthetic History   No history of anesthetic complications            Review of Systems / Medical History  Patient summary reviewed, nursing notes reviewed and pertinent labs reviewed    Pulmonary  Within defined limits      Sleep apnea: CPAP           Neuro/Psych   Within defined limits           Cardiovascular    Hypertension          Hyperlipidemia    Exercise tolerance: >4 METS     GI/Hepatic/Renal         Renal disease: CRI    Pertinent negatives: No liver disease   Endo/Other    Diabetes    Morbid obesity and anemia  Pertinent negatives: No hypothyroidism and hyperthyroidism   Other Findings            Physical Exam    Airway  Mallampati: III  TM Distance: 4 - 6 cm  Neck ROM: normal range of motion   Mouth opening: Normal     Cardiovascular    Rhythm: regular  Rate: normal         Dental  No notable dental hx       Pulmonary  Breath sounds clear to auscultation               Abdominal  GI exam deferred       Other Findings            Anesthetic Plan    ASA: 3  Anesthesia type: general          Induction: Intravenous  Anesthetic plan and risks discussed with: Patient

## 2022-03-10 NOTE — PERIOP NOTES
Called holding and spoke with Joana Guadarrama RN. Notified her that warning pops up when ancef is ordered due to patient's kidney function. She states she will verify ancef order with Dr. Verito Pineda prior to procedure.

## 2022-03-10 NOTE — NURSE NAVIGATOR
Patient's wife at bedside. Patient dozing in and out of sleep throughout visit. Patient complained of expected pain with mild nausea. Vitals:   Visit Vitals  BP (!) 164/92   Pulse 91   Temp 99.4 °F (37.4 °C)   Resp 20   Ht 5' 7\" (1.702 m)   Wt 136.8 kg (301 lb 8 oz)   SpO2 100%   BMI 47.22 kg/m²     General: Alert & oriented to person, place, time, and situation  Pulmonary: Lungs clear to auscultation in all fields. Cardiac: Regular rate and rhythm  Abdomen: Appropriate tenderness; soft; non-distended;   hypo-active bowel sounds  Lap sites: Within normal limits  MECHELLE drain: Scant amount of serosanguinous drainage within tubing  SCD's: In place and operating WNL    Plan:  -Continue medications for symptom management  -Encourage ambulation  -SCD use when in bed  -IS 10 times an hour  -NPO  -UGI in AM; bariatric full liquid diet  if UGI within normal limits    Plan was discussed with patient, as well as IS teaching provided. Patient verbalized understanding to plan and education. Patient completed IS x3 during this visit with no issues or concerns noted by this RN. He reached 1,500 mL.

## 2022-03-10 NOTE — OP NOTES
OPERATIVE REPORT         Patient:Arie Lynn   : 1985  Medical Record Number:[id number]    Pre-operative Diagnosis:  HYPERTENSION,DIABETES,MORBID OBESITY,BMI 49, FATTY LIVER  Post-operative Diagnosis: HYPERTENSION,DIABETES,MORBID OBESITY,BMI 49, FATTY LIVER  Procedure: Procedure(s):  LAPAROSCOPIC SLEEVE GASTRECTOMY  OVERSEW OF GASTRIC STAPLE LINE   LIVER WEDGE BIOPSY  INTRAOPERATIVE ENDOSCOPY WITH BIOPSY  Location: [facility]  Surgeon: Love Burnham MD  Assistant:  Fabiola Sandoval [facility]  - (there are no qualified interns, residents, or any other qualified house   physicians available to assist with this surgery) performed retraction of various structures,  assisted in   creation of the gastric sleeve, fired stapling devices, obtained hemostasis along staple lines via hemoclips,       Anesthesia: General       Specimens: 1. Gastric Sleeve Resection                       2. Liver Wedge Biopsy                       3. Endoscopy biopsy    EBL: less than 5cc  Additional Findings: None  Complications: None             STATEMENT OF MEDICAL NECESSITY: The patient is a 39y.o.-year-old male who has   had a history of obesity. he has failed conservative weight loss measures,   such began to consider weight loss surgical options. he chose the   sleeve gastrectomy as a means of surgical weight control. he has undergone   nutritional and psychological teaching at this time period and does wish to proceed   with sleeve gastrectomy. OPERATIVE PROCEDURE: The patient was brought to the operating room, placed   on the table in supine position at which time general  anesthesia was administered   without any difficulty. The abdomen was then prepped and draped in the   usual sterile fashion. Using a 15 blade, a 1 cm incision was made just to the   left of the umbilicus. The veress needle approach was used to gain access to   the peritoneal cavity which was then insufflated.  The Visi-Port was then placed   at that site,then 4 additional trocars were placed in the usual U-shaped   configuration with a subxiphoid incision being made to accommodate the   [facility] retractor. On entering the abdomen, the patient was noted to have a   moderately fatty liver with possible evidence of early steatohepatitis. I elevated the liver and noted the   patient had no diaphragmatic hernia present. I began the operation by choosing an area 2-3 cm   proximal to the pylorus and within the gastroepiploic vessels I began to divide off these vessels individually. I moved cephalad toward short gastric vessels, which were very difficult to take down due to the proximity   to the splenic hilum. I was able to do so, clearing the entire left crural area. I then placed a Visigi tube,   impacting at the distal antrum. I then began the resection with the powered [place]   stapler using the green loads with Endopath buttress strips for the first firing tangential along the   antral region. The second and subsequent firings were used with green and black  reloads and the same buttress strips reaching just past the incisura region. The remainder of the 5 vertical   firings completed the resection at the left crural region. I then tested   the pouch via the Visigi tube using dilute methylene blue,it was noted to be completely   Watertight. It was at this juncture that I obtained a 2-0 strata fix suture and began running the staple line   from the apex to about mid body of the stomach. I did this due to the patient's significant history of health   issues and the fact that this is the usual region for a leak postoperatively and this was the thickest area of the stomach. This went without event. I   then left the operative field and proceeded to the the head of the bed and performed an   intraoperative EGD. The scope was passed successfully into the gastric sleeve to the level of the pylorus.    Biopsies were taken of this region and submitted to test both for H Pylori and for pathologic diagnosis. There was no bleeding noted and no leak appreciated with air insufflation. I then returned to the surgical field. I then obtained hemostasis along the staple line using   Hemoclips and sutures where needed. I then used 3 separate 2-0 Ethibond sutures to secure the lateral   aspect of the newly created sleeve stomach to the resected edge of the gastrocolic omentum in an effort   to maintain the continuity of the sleeve and prevent twisting. I then used Eviseal   along the entire staple line to obtain hemostasis and then place Surgicel Snow over the staple line also. With all this having been completed, I then removed the liver retractor. I performed a liver wedge biopsy of   the left lobe of the liver due to its abnormality and submitted to pathology for permanent section. I then placed   a MECHELLE drain in the left upper quadrant region along the staple line. I removed the specimen from the operative   field via the LLQ incision. I closed the left lower quadrant trocar site using a transabdominal #0 PDS   suture along the fascia, and all skin incisions were then closed using 4-0 subcuticular Monocryl. Steri-Strips   and sterile dressings were applied. The patient tolerated the procedure well.        Ag Ortega M.D.

## 2022-03-10 NOTE — ANESTHESIA POSTPROCEDURE EVALUATION
Procedure(s):  LAPAROSCOPIC SLEEVE GASTRECTOMY, OVERSEW OF GASTRIC STAPLE LINE,  WITH LIVER WEDGE BIOPSY AND INTRAOPERATIVE ENDOSCOPY WITH BIOPSY. general    Anesthesia Post Evaluation        Comments: Post-Anesthesia Evaluation and Assessment    Cardiovascular Function/Vital Signs  BP (!) 150/86   Pulse 68   Temp 36.7 °C (98.1 °F)   Resp 18   Ht 5' 7\" (1.702 m)   Wt 136.8 kg (301 lb 8 oz)   SpO2 100%   BMI 47.22 kg/m²     Patient is status post Procedure(s):  LAPAROSCOPIC SLEEVE GASTRECTOMY, OVERSEW OF GASTRIC STAPLE LINE,  WITH LIVER WEDGE BIOPSY AND INTRAOPERATIVE ENDOSCOPY WITH BIOPSY. Nausea/Vomiting: Controlled. Postoperative hydration reviewed and adequate. Pain:  Pain Scale 1: Visual (03/10/22 1323)  Pain Intensity 1: 2 (03/10/22 1323)   Managed. Neurological Status:   Neuro (WDL): Exceptions to WDL (03/10/22 1303)   At baseline. Mental Status and Level of Consciousness: Arousable. Pulmonary Status:   O2 Device: Nasal cannula (03/10/22 1308)   Adequate oxygenation and airway patent. Complications related to anesthesia: None    Post-anesthesia assessment completed. No concerns. Patient has met all discharge requirements. Signed By: Bruce Short MD    March 10, 2022                   INITIAL Post-op Vital signs:   Vitals Value Taken Time   /88 03/10/22 1320   Temp 36.7 °C (98.1 °F) 03/10/22 1255   Pulse 69 03/10/22 1323   Resp 22 03/10/22 1323   SpO2 99 % 03/10/22 1323   Vitals shown include unvalidated device data.

## 2022-03-10 NOTE — PERIOP NOTES
Reviewed PTA medication list with patient/caregiver and patient/caregiver denies any additional medications. Patient admits to having a responsible adult care for them at home for at least 24 hours after surgery. Patient encouraged to use gown warming system and informed that using said warming gown to regulate body temperature prior to a procedure has been shown to help reduce the risks of blood clots and infection. Patient's pharmacy of choice verified and documented in PTA medication section. Dual skin assessment & fall risk band verification completed with [facility] [place], operated by [facility].

## 2022-03-10 NOTE — INTERVAL H&P NOTE
Update History & Physical    The Patient's History and Physical of February 28, 2022 was reviewed with the patient and I examined the patient. There was no change. We did have him follow-up with the nephrologist due to his persistently high creatinine at a value of 2.3 and his nephrologist felt like this was not going to be an issue related to his surgery and is cleared him for the surgical procedure. The surgical site was confirmed by the patient and me. Plan:  The risk, benefits, expected outcome, and alternative to the recommended procedure have been discussed with the patient. Patient understands and wants to proceed with the procedure.     Electronically signed by Eliezer Gorman MD on 3/10/2022 at 10:32 AM

## 2022-03-11 ENCOUNTER — APPOINTMENT (OUTPATIENT)
Dept: GENERAL RADIOLOGY | Age: 37
DRG: 403 | End: 2022-03-11
Attending: SPECIALIST
Payer: MEDICAID

## 2022-03-11 VITALS
SYSTOLIC BLOOD PRESSURE: 135 MMHG | TEMPERATURE: 98.6 F | OXYGEN SATURATION: 96 % | HEIGHT: 67 IN | BODY MASS INDEX: 47.32 KG/M2 | DIASTOLIC BLOOD PRESSURE: 68 MMHG | RESPIRATION RATE: 20 BRPM | WEIGHT: 301.5 LBS | HEART RATE: 98 BPM

## 2022-03-11 DIAGNOSIS — R79.89 ELEVATED SERUM CREATININE: Primary | ICD-10-CM

## 2022-03-11 LAB
ANION GAP SERPL CALC-SCNC: 7 MMOL/L (ref 3–18)
BUN SERPL-MCNC: 52 MG/DL (ref 7–18)
BUN/CREAT SERPL: 21 (ref 12–20)
CALCIUM SERPL-MCNC: 8.8 MG/DL (ref 8.5–10.1)
CHLORIDE SERPL-SCNC: 110 MMOL/L (ref 100–111)
CO2 SERPL-SCNC: 23 MMOL/L (ref 21–32)
CREAT SERPL-MCNC: 2.49 MG/DL (ref 0.6–1.3)
GLUCOSE BLD STRIP.AUTO-MCNC: 108 MG/DL (ref 70–110)
GLUCOSE BLD STRIP.AUTO-MCNC: 125 MG/DL (ref 70–110)
GLUCOSE BLD STRIP.AUTO-MCNC: 185 MG/DL (ref 70–110)
GLUCOSE SERPL-MCNC: 147 MG/DL (ref 74–99)
POTASSIUM SERPL-SCNC: 5 MMOL/L (ref 3.5–5.5)
SODIUM SERPL-SCNC: 140 MMOL/L (ref 136–145)

## 2022-03-11 PROCEDURE — 74011250637 HC RX REV CODE- 250/637: Performed by: SPECIALIST

## 2022-03-11 PROCEDURE — 77010033678 HC OXYGEN DAILY

## 2022-03-11 PROCEDURE — 80048 BASIC METABOLIC PNL TOTAL CA: CPT

## 2022-03-11 PROCEDURE — 74011000636 HC RX REV CODE- 636: Performed by: SPECIALIST

## 2022-03-11 PROCEDURE — C9113 INJ PANTOPRAZOLE SODIUM, VIA: HCPCS | Performed by: SPECIALIST

## 2022-03-11 PROCEDURE — 82962 GLUCOSE BLOOD TEST: CPT

## 2022-03-11 PROCEDURE — 74011250636 HC RX REV CODE- 250/636: Performed by: SPECIALIST

## 2022-03-11 PROCEDURE — 74011000250 HC RX REV CODE- 250: Performed by: SPECIALIST

## 2022-03-11 PROCEDURE — 74240 X-RAY XM UPR GI TRC 1CNTRST: CPT

## 2022-03-11 PROCEDURE — 36415 COLL VENOUS BLD VENIPUNCTURE: CPT

## 2022-03-11 RX ORDER — CARVEDILOL 12.5 MG/1
25 TABLET ORAL 2 TIMES DAILY WITH MEALS
Status: DISCONTINUED | OUTPATIENT
Start: 2022-03-11 | End: 2022-03-11 | Stop reason: HOSPADM

## 2022-03-11 RX ORDER — OXYCODONE AND ACETAMINOPHEN 5; 325 MG/1; MG/1
1 TABLET ORAL
Qty: 30 TABLET | Refills: 0 | Status: SHIPPED | OUTPATIENT
Start: 2022-03-11 | End: 2022-03-16

## 2022-03-11 RX ORDER — HYDRALAZINE HYDROCHLORIDE 50 MG/1
50 TABLET, FILM COATED ORAL ONCE
Status: COMPLETED | OUTPATIENT
Start: 2022-03-11 | End: 2022-03-11

## 2022-03-11 RX ORDER — OXYCODONE AND ACETAMINOPHEN 5; 325 MG/1; MG/1
1-2 TABLET ORAL
Status: DISCONTINUED | OUTPATIENT
Start: 2022-03-11 | End: 2022-03-11 | Stop reason: HOSPADM

## 2022-03-11 RX ORDER — CARVEDILOL 12.5 MG/1
25 TABLET ORAL ONCE
Status: COMPLETED | OUTPATIENT
Start: 2022-03-11 | End: 2022-03-11

## 2022-03-11 RX ADMIN — HYDRALAZINE HYDROCHLORIDE 50 MG: 50 TABLET, FILM COATED ORAL at 00:44

## 2022-03-11 RX ADMIN — ENOXAPARIN SODIUM 40 MG: 100 INJECTION SUBCUTANEOUS at 10:06

## 2022-03-11 RX ADMIN — MORPHINE SULFATE 8 MG: 4 INJECTION INTRAVENOUS at 00:06

## 2022-03-11 RX ADMIN — SODIUM CHLORIDE, POTASSIUM CHLORIDE, SODIUM LACTATE AND CALCIUM CHLORIDE 150 ML/HR: 600; 310; 30; 20 INJECTION, SOLUTION INTRAVENOUS at 00:10

## 2022-03-11 RX ADMIN — SODIUM CHLORIDE 40 MG: 9 INJECTION, SOLUTION INTRAMUSCULAR; INTRAVENOUS; SUBCUTANEOUS at 08:27

## 2022-03-11 RX ADMIN — CARVEDILOL 25 MG: 12.5 TABLET, FILM COATED ORAL at 00:44

## 2022-03-11 RX ADMIN — MORPHINE SULFATE 8 MG: 4 INJECTION INTRAVENOUS at 05:09

## 2022-03-11 RX ADMIN — OXYCODONE AND ACETAMINOPHEN 2 TABLET: 5; 325 TABLET ORAL at 13:50

## 2022-03-11 RX ADMIN — METOCLOPRAMIDE HYDROCHLORIDE 5 MG: 5 INJECTION INTRAMUSCULAR; INTRAVENOUS at 08:28

## 2022-03-11 RX ADMIN — MORPHINE SULFATE 8 MG: 4 INJECTION INTRAVENOUS at 08:28

## 2022-03-11 RX ADMIN — CARVEDILOL 25 MG: 12.5 TABLET, FILM COATED ORAL at 10:06

## 2022-03-11 RX ADMIN — IOHEXOL 60 ML: 240 INJECTION, SOLUTION INTRATHECAL; INTRAVASCULAR; INTRAVENOUS; ORAL at 07:41

## 2022-03-11 RX ADMIN — METOCLOPRAMIDE HYDROCHLORIDE 5 MG: 5 INJECTION INTRAMUSCULAR; INTRAVENOUS at 03:29

## 2022-03-11 NOTE — PROGRESS NOTES
Transition of Care (REMY) Plan:          Pt admitted for an elective Laparoscopic sleeve gastrectomy surgical procedure. Pt is independent and has spousal support. Please encourage ambulation. No transition  of care needs identified at this time. Anticipate pt will be medically stable for discharge within the next 24-48 hours with physician follow up. CM available to assist as needed. REMY Transportation:   How is patient being transported at discharge? Family/Friend      When? Once cleared by physician     Is transport scheduled? N/A      Follow-up appointment and transportation:   PCP/Specialist?  See AVS for Appointment         Who is transporting to the follow-up appointment? Self/Family/Friend      Is transport for follow up appointment scheduled? N/A    Communication plan (with patient/family): Who is being called? Patient or Next of Kin? Responsible party? Patient      What number(s) is to be used? See Facesheet      What service provider is calling for [facility] services? When are they calling? Readmission Risk? (Green/Low; Yellow/Moderate; Red/High):  [facility] [address] including selection of the Healthcare Decision Maker Relationship (ie \"Primary\")  Care Management Interventions  PCP Verified by CM: Yes  Palliative Care Criteria Met (RRAT>21 & CHF Dx)?: No  Mode of Transport at Discharge:  Other (see comment) (spouse)  Physical Therapy Consult: No  Occupational Therapy Consult: No  Support Systems: Spouse/Significant Other  Confirm Follow Up Transport: Family  Discharge Location  Patient Expects to be Discharged to[de-identified] Home with family assistance

## 2022-03-11 NOTE — ROUTINE PROCESS
Bedside and Verbal shift change report given to Stephanie Nolasco RN by Aaron Guillermo. Report included the following information SBAR, Kardex, OR Summary, Intake/Output and MAR.

## 2022-03-11 NOTE — PROGRESS NOTES
1925 - Bedside report received from Nayan, [facility]. Patient in bed. Pain 4/10.     2030 - Patient in bed at this time. IV to RA  intact and patent. Sequential compression device bilaterally. TEDs to BLE. Dressings to abd CDI. + CMS. Pt A & O x 4. LS clear, on RA. Abdomen soft, NT and ND. + BS to all 4 quadrants. Denies nausea. Pain 4/10. Call light within reach. 2203-Contacted Dr CURRY about the pt's elevated BP and HR, made him aware of the PTA BP meds. Pt in NAD, denies C.P or SOB. Pt also put back on 2L of O2 NC.  MD said to lower IVF rate to 100 CCs per hr, order implemented immediately. Will monitor. 0030-Order obtained from Dr CURRY for Hydralazine and Coreg per PTA meds x 1.

## 2022-03-11 NOTE — DISCHARGE INSTRUCTIONS
[facility]  Sanjuanajim Currieing. Eliezer Mukherjee M.D., F.A.C.S.  [address]. [address] [place] [address] Rd, [address] [facility]  Office: [phone]    Fax:  [phone]    Discharge Instruction for Gastric Bypass / Sleeve Gastrectomy Patients    Diet:   Continue with the liquid diet until you are seen in the office. Make sure you sip fluids all day. Aim for  Gms of protein every day. Activity:   Walking is encouraged. Rest when you are tired.  You may shower.  You may climb stairs. Take your time.  No lifting more than 10-15 lbs.  If you feel discomfort during an activity, rest.   Do not drive for 1 week. Wound Care:   Clean incisions with soap and water when in the shower. Pat dry.  Leave steri-strips on until they fall off.  A small amount of drainage may be present from the incisions. Contact the office if you notice an increase in drainage, an odor, increased redness, or fever > 100.5. Medications:   You will receive a prescription for pain medication at the time of discharge.  For upset stomach you may take over the counter medications such as Maalox, Mylanta, Pepcid, Zantac, or Tagamet.  You may take Tylenol   Non-aspirin based arthritis medications may be resumed after the first month.  Take a childrens or adult chewable multivitamin.  Milk of Magnesia will help with constipation.  It is fine to take your usual home medications. Blood pressure medications should be continued after surgery. Diabetic medications can frequently be reduced very quickly after surgery. Diabetics should continue to monitor blood sugars frequently after surgery and contact the prescribing physician for any questions. Follow-Up Appointment:   If you do not already have a follow-up appointment scheduled, contact the office in the next few days to obtain one. It is usually scheduled for 10-14 days after you surgery date. Office phone number:  [phone].         REV 09/2010      DISCHARGE SUMMARY from Nurse    PATIENT INSTRUCTIONS: Next dose of lovenox at 10pm    After general anesthesia or intravenous sedation, for 24 hours or while taking prescription Narcotics:  · Limit your activities  · Do not drive and operate hazardous machinery  · Do not make important personal or business decisions  · Do  not drink alcoholic beverages  · If you have not urinated within 8 hours after discharge, please contact your surgeon on call. Report the following to your surgeon:  · Excessive pain, swelling, redness or odor of or around the surgical area  · Temperature over 100.5  · Nausea and vomiting lasting longer than 4 hours or if unable to take medications  · Any signs of decreased circulation or nerve impairment to extremity: change in color, persistent  numbness, tingling, coldness or increase pain  · Any questions    What to do at Home:    *  Please give a list of your current medications to your Primary Care Provider. *  Please update this list whenever your medications are discontinued, doses are      changed, or new medications (including over-the-counter products) are added. *  Please carry medication information at all times in case of emergency situations. These are general instructions for a healthy lifestyle:    No smoking/ No tobacco products/ Avoid exposure to second hand smoke  Surgeon General's Warning:  Quitting smoking now greatly reduces serious risk to your health. Obesity, smoking, and sedentary lifestyle greatly increases your risk for illness    A healthy diet, regular physical exercise & weight monitoring are important for maintaining a healthy lifestyle      The discharge information has been reviewed with the patient. The patient verbalized understanding.   Discharge medications reviewed with the patient and appropriate educational materials and side effects teaching were provided.   ___________________________________________________________________________________________________________________________________

## 2022-03-11 NOTE — DISCHARGE SUMMARY
Discharge Summary    Patient: Noemy Covington               Sex: male          DOA: 3/10/2022         YOB: 1985      Age:  39 y.o.        LOS:  LOS: 1 day                Admit Date: 3/10/2022    Discharge Date: 3/11/2022    Admission Diagnoses: Morbid obesity with BMI of 45.0-49.9, adult ([facility] [E66.01, Z68.42]    Discharge Diagnoses:    Problem List as of 3/11/2022 Date Reviewed: 11/9/2021          Codes Class Noted - Resolved    Sleep apnea ICD-10-CM: G47.30  ICD-9-CM: 780.57  Unknown - Present    Overview Signed 2/28/2022  3:06 PM by SUMIT Batres     Dx Feb 2022 - awaiting c-pap machine             Hypertension ICD-10-CM: I10  ICD-9-CM: 401.9  Unknown - Present        Diabetes ([facility] ICD-10-CM: E11.9  ICD-9-CM: 250.00  Unknown - Present        Morbid obesity ([facility] ICD-10-CM: E66.01  ICD-9-CM: 278.01  Unknown - Present        Morbid obesity with BMI of 45.0-49.9, adult ([facility] ICD-10-CM: E66.01, Z68.42  ICD-9-CM: 278.01, V85.42  Unknown - Present        Diabetes mellitus ([facility] ICD-10-CM: E11.9  ICD-9-CM: 250.00  Unknown - Present    Overview Signed 10/29/2021  4:24 PM by Teresa Muñoz NP     Dx'd 2018, HgA1c 9.4% 3/2021             Hx of diabetic foot ulcer ICD-10-CM: Z86.31  ICD-9-CM: V12.29  Unknown - Present    Overview Signed 10/29/2021  4:24 PM by Teresa Muñoz NP     7/2021             HI (acute kidney injury) ([facility] ICD-10-CM: N17.9  ICD-9-CM: 124. 9  Unknown - Present        Acid reflux ICD-10-CM: K21.9  ICD-9-CM: 530.81  Unknown - Present        Stage 3b chronic kidney disease ([facility] ICD-10-CM: N18.32  ICD-9-CM: 585.3  9/12/2021 - Present        Erectile dysfunction ICD-10-CM: N52.9  ICD-9-CM: 607.84  1/5/2020 - Present        Snoring ICD-10-CM: R06.83  ICD-9-CM: 786.09  1/5/2020 - Present        Anemia ICD-10-CM: D64.9  ICD-9-CM: 285.9  1/5/2020 - Present        Hyperlipidemia LDL goal <70 ICD-10-CM: E78.5  ICD-9-CM: 272.4  12/14/2018 - Present        Cataract of both eyes ICD-10-CM: H26.9  ICD-9-CM: 366.9  10/8/2018 - Present        Hypertensive retinopathy of both eyes ICD-10-CM: H35.033  ICD-9-CM: 362.11  10/8/2018 - Present        Essential hypertension ICD-10-CM: I10  ICD-9-CM: 401.9  9/6/2018 - Present        Controlled type 2 diabetes mellitus, without long-term current use of insulin (HCC) ICD-10-CM: E11.9  ICD-9-CM: 250.00  6/19/2018 - Present        Proliferative diabetic retinopathy of both eyes ([facility] ICD-10-CM: N03.5484  ICD-9-CM: 250.50, 362.02  6/19/2018 - Present        Obesity, morbid, BMI 40.0-49.9 ([facility] ICD-10-CM: E66.01  ICD-9-CM: 278.01  5/20/2018 - Present        Lumbar facet arthropathy ICD-10-CM: M47.816  ICD-9-CM: 721.3  5/20/2018 - Present              Discharge Condition: Good    Hospital Course: The patient underwent  laparoscopic sleeve gastrectomy  on 3/10/2022. The patient tolerated the procedure well. Vital signs remained stable and the patient was transferred to  3rd floor surgical unit without complications. The patient remained stable throughout the first night post operatively with stable vital signs and adequate urine output and pain control. Pain was controlled with Dilaudid IV and IV Tylenol . The patient on the first morning post operative was transferred to the radiology suite where they underwent a gastrograffin UGI study which showed no evidence of a leak or stricture. The drain was discontinued on POD # 1 and the patient was started on a bariatric liquid diet with protein shakes. The patient progressed throughout the day and was ambulating well and tolerating their diet. They were therefore discharged home with instructions to notify me with any issues that may arise. Significant Diagnostic Studies:   No results for input(s): HGB, HGBEXT in the last 72 hours. No results for input(s): HCT, HCTEXT in the last 72 hours.     Current Discharge Medication List      START taking these medications    Details   oxyCODONE-acetaminophen (PERCOCET) 5-325 mg per tablet Take 1 Tablet by mouth every four (4) hours as needed for Pain for up to 5 days. Max Daily Amount: 6 Tablets. Qty: 30 Tablet, Refills: 0  Start date: 3/11/2022, End date: 3/16/2022    Associated Diagnoses: Post-op pain         CONTINUE these medications which have NOT CHANGED    Details   fluticasone propionate (FLONASE) 50 mcg/actuation nasal spray Use 2 spray(s) in each nostril once daily  Qty: 16 g, Refills: 5    Associated Diagnoses: Allergic rhinitis, unspecified seasonality, unspecified trigger      losartan (COZAAR) 100 mg tablet Take 1 tablet by mouth once daily  Qty: 90 Tablet, Refills: 3    Associated Diagnoses: Essential hypertension      hydrALAZINE (APRESOLINE) 50 mg tablet Take 50 mg by mouth three (3) times daily. ezetimibe (ZETIA) 10 mg tablet Take 1 tablet by mouth once daily  Qty: 90 Tablet, Refills: 3    Associated Diagnoses: Hyperlipidemia LDL goal <70      atorvastatin (LIPITOR) 40 mg tablet Take 1 tablet by mouth once daily  Qty: 90 Tablet, Refills: 3    Associated Diagnoses: Hyperlipidemia LDL goal <70      !! TRUEplus Lancets 33 gauge misc USE TO CHECK BLOOD SUGAR TWICE DAILY      carvediloL (Coreg) 25 mg tablet Take 1 Tablet by mouth two (2) times daily (with meals). Qty: 60 Tablet, Refills: 5    Associated Diagnoses: Essential hypertension      Blood-Glucose Meter (True Metrix Glucose Meter) misc Check glucose twice daily. DX P79.64T4  Qty: 50 Each, Refills: 5      glucose blood VI test strips (blood glucose test) strip Check glucose twice daily. DX U19.32X6  Qty: 100 Strip, Refills: 5      !! lancets misc USE TO CHECK BLOOD SUGAR TWICE DAILY  Qty: 100 Each, Refills: 5      Blood-Glucose Meter monitoring kit Use to check blood sugar 2 times per day. Qty: 1 Kit, Refills: 0    Associated Diagnoses: Type 2 diabetes mellitus without complication, without long-term current use of insulin ([facility]       ! ! - Potential duplicate medications found.  Please discuss with provider. STOP taking these medications       Jardiance 25 mg tablet Comments:   Reason for Stopping:         Trulicity 1.08 YX/0.5 mL sub-q pen Comments:   Reason for Stopping:               Activity: activity as tolerated with no heavy lifting of greater than 20 pounds. No anti- inflammatory medications. Use stool softeners at home as needed while taking pain medications since they are constipating. Diet: Bariatric liquid diet    Wound Care: Keep wound clean and dry, Reinforce dressing PRN and ice to area for comfort. Do not get wound wet for 2 days.     Follow-up: 14 days with Dr Neldon Goldmann M.D

## 2022-03-11 NOTE — PROGRESS NOTES
Bariatric Surgery                POD #1    Visit Vitals  BP (!) 161/74   Pulse 100   Temp 99.1 °F (37.3 °C)   Resp 18   Ht 5' 7\" (1.702 m)   Wt 136.8 kg (301 lb 8 oz)   SpO2 99%   BMI 47.22 kg/m²     Patient has minimal complaints of pain, minimal nausea noted     Exam:  Appears well in no distress  Lungs- clear bilaterally  Abd - soft, incisions look good without erythema           MECHELLE with minimal serosanguinous output  Extremities- no new edema or swelling    UGI - no obstrustion or leak    Data Review:    Labs: Results:       Chemistry Recent Labs     03/11/22  0830 03/10/22  0944   *  --      --    K 5.0 4.6     --    CO2 23  --    BUN 52*  --    CREA 2.49*  --    CA 8.8  --    AGAP 7  --    BUCR 21*  --       CBC w/Diff No results for input(s): WBC, RBC, HGB, HCT, PLT, GRANS, LYMPH, EOS, RETIC, HGBEXT, HCTEXT, PLTEXT in the last 72 hours. Coagulation No results for input(s): PTP, INR, APTT, INREXT in the last 72 hours. Liver Enzymes No results for input(s): TP, ALB, TBIL, AP in the last 72 hours. No lab exists for component: SGOT, GPT, DBIL       Assessment/Plan: S/P  laparoscopic sleeve gastrectomy - doing well without any issues    1. Start bariatric diet and protein shakes  2. D/C IV pain meds and start PO pain meds  3. D/C MECHELLE drain  4. Likley PM D/C if  Cont ok and tolerate PO

## 2022-03-11 NOTE — ROUTINE PROCESS
Verbal shift change report given to You Strong RN by Charo Yates RN. Report included the following information SBAR, Kardex, Summary, Intake/Output and MAR.

## 2022-03-11 NOTE — NURSE NAVIGATOR
Patient sitting on side of bed sipping protein drink and tolerating well. Vitals:   Blood pressure (!) 161/74, pulse 100, temperature 99.1 °F (37.3 °C), resp. rate 18, height 5' 7\" (1.702 m), weight 136.8 kg (301 lb 8 oz), SpO2 99 %. Output: reviewed, and patient verified urinating clear, yellow urine. Pulmonary: Clear in all lobes  Cardiac: Regular rate and rhythm  Abdomen: Bowel sounds hypo-active x4. Lap sites without erythema, swelling,  and/or drainage. MECHELLE drain with a small amount of serosanguinous drainage. SCD's: Positioned and operating WNL    Patient with expected pain, but is being managed and is currently tolerable. No nausea and/or vomiting. Patient has been ambulating the halls. Patient is able to swallow pills. Post-op diet progression discussed with patient. Patient to be discharged on a bariatric full liquid diet. Patient verbalized understanding of liquid diet for next two weeks until first post-op visit. Goal of four ounces per hour with one ounce being protein was clearly understood. Medications were discussed (i.e., pain- Percocet, Tylenol, not to use aspirin or ibuprofen based products, as well as steroids). Constipation was discussed and ways to alleviate it were discussed. Education completed on IS use and to ambulate every hour when at home. Patient completed a return demonstration on IS with no issues or concerns from this RN. Lovenox and Percocet filled and in the home. Lovenox and Percocet education provided, and patient verbalized understanding. Patient has chewable multi-vitamin, probiotic, and Prilosec in the home; they were reviewed. Ketosis was also reviewed. Patient given a report card to record intake and a handout to support bedside education. All questions were answered by this RN, and patient verbalized understanding to all education provided. Goals for discharge were discussed, and patient verbalized understanding. Post-op follow-up appt.  rocío scheduled.

## 2022-03-11 NOTE — PROGRESS NOTES
Problem: Falls - Risk of  Goal: *Absence of Falls  Description: Document Mi Parra Fall Risk and appropriate interventions in the flowsheet.   Outcome: Progressing Towards Goal  Note: Fall Risk Interventions:  Mobility Interventions: Communicate number of staff needed for ambulation/transfer,Patient to call before getting OOB         Medication Interventions: Patient to call before getting OOB,Teach patient to arise slowly    Elimination Interventions: Call light in reach,Patient to call for help with toileting needs              Problem: Patient Education: Go to Patient Education Activity  Goal: Patient/Family Education  Outcome: Progressing Towards Goal     Problem: Patient Education: Go to Patient Education Activity  Goal: Patient/Family Education  Outcome: Progressing Towards Goal     Problem: Gastric Sleeve Pathway / Bariatric Revision Pathway: Day of Surgery  Goal: Activity/Safety  Outcome: Progressing Towards Goal  Goal: Consults, if ordered  Outcome: Progressing Towards Goal  Goal: Diagnostic Test/Procedures  Outcome: Progressing Towards Goal  Goal: Nutrition/Diet  Outcome: Progressing Towards Goal  Goal: Medications  Outcome: Progressing Towards Goal  Goal: Respiratory  Outcome: Progressing Towards Goal  Goal: Treatments/Interventions/Procedures  Outcome: Progressing Towards Goal  Goal: Psychosocial  Outcome: Progressing Towards Goal  Goal: *No signs and symptoms of infection or wound complications  Outcome: Progressing Towards Goal  Goal: *Optimal pain control at patient's stated goal  Outcome: Progressing Towards Goal  Goal: *Adequate urinary output (equal to or greater than 30 milliliters/hour)  Outcome: Progressing Towards Goal  Goal: *Hemodynamically stable  Outcome: Progressing Towards Goal  Goal: *Tolerating diet  Outcome: Progressing Towards Goal  Goal: *Demonstrates progressive activity  Outcome: Progressing Towards Goal  Goal: *Absence of signs and symptoms of DVT  Outcome: Progressing Towards Goal  Goal: *Labs within defined limits  Outcome: Progressing Towards Goal  Goal: *Oxygen saturation within defined limits  Outcome: Progressing Towards Goal     Problem: Gastric Sleeve Pathway / Bariatric Revision Pathway: Post-Op Day 1  Goal: Off Pathway (Use only if patient is Off Pathway)  Outcome: Progressing Towards Goal  Goal: Activity/Safety  Outcome: Progressing Towards Goal  Goal: Diagnostic Test/Procedures  Outcome: Progressing Towards Goal  Goal: Nutrition/Diet  Outcome: Progressing Towards Goal  Goal: Discharge Planning  Outcome: Progressing Towards Goal  Goal: Medications  Outcome: Progressing Towards Goal  Goal: Respiratory  Outcome: Progressing Towards Goal  Goal: Treatments/Interventions/Procedures  Outcome: Progressing Towards Goal  Goal: Psychosocial  Outcome: Progressing Towards Goal  Goal: *No signs and symptoms of infection or wound complications  Outcome: Progressing Towards Goal  Goal: *Optimal pain control at patient's stated goal  Outcome: Progressing Towards Goal  Goal: *Adequate urinary output (equal to or greater than 30 milliliters/hour)  Outcome: Progressing Towards Goal  Goal: *Hemodynamically stable  Outcome: Progressing Towards Goal  Goal: *Tolerating diet  Outcome: Progressing Towards Goal  Goal: *Demonstrates progressive activity  Outcome: Progressing Towards Goal  Goal: *Absence of signs and symptoms of DVT  Outcome: Progressing Towards Goal  Goal: *Labs within defined limits  Outcome: Progressing Towards Goal  Goal: *Oxygen saturation within defined limits  Outcome: Progressing Towards Goal     Problem: Gastric Sleeve Pathway / Bariatric Revision Pathway: Post-Op Day 2  Goal: Off Pathway (Use only if patient is Off Pathway)  Outcome: Progressing Towards Goal  Goal: Activity/Safety  Outcome: Progressing Towards Goal  Goal: Diagnostic Test/Procedures  Outcome: Progressing Towards Goal  Goal: Nutrition/Diet  Outcome: Progressing Towards Goal  Goal: Discharge Planning  Outcome: Progressing Towards Goal  Goal: Medications  Outcome: Progressing Towards Goal  Goal: Respiratory  Outcome: Progressing Towards Goal  Goal: Treatments/Interventions/Procedures  Outcome: Progressing Towards Goal  Goal: Psychosocial  Outcome: Progressing Towards Goal     Problem: Gastric Sleeve Pathway / Bariatric Revision Pathway: Discharge Outcomes  Goal: *Active bowel sounds  Outcome: Progressing Towards Goal  Goal: *Absence of signs and symptoms of DVT  Outcome: Progressing Towards Goal  Goal: *Hemodynamically stable  Outcome: Progressing Towards Goal  Goal: *Lungs clear or at baseline  Outcome: Progressing Towards Goal  Goal: *Demonstrates independent activity or return to baseline  Outcome: Progressing Towards Goal  Goal: *Optimal pain control at patient's stated goal  Outcome: Progressing Towards Goal  Goal: *Verbalizes understanding and describes prescribed diet  Outcome: Progressing Towards Goal  Goal: *Tolerating diet  Outcome: Progressing Towards Goal  Goal: *Verbalizes name, dosage, time, side effects, and number of days to continue medications  Outcome: Progressing Towards Goal  Goal: *No signs and symptoms of infection or wound complications  Outcome: Progressing Towards Goal  Goal: *Anxiety reduced or absent  Outcome: Progressing Towards Goal  Goal: *Understands and describes signs and symptoms to report to providers (eg: s/s of leak, DVT; inability to tolerate diet)  Outcome: Progressing Towards Goal  Goal: *Describes follow-up/return visits to physicians  Outcome: Progressing Towards Goal  Goal: *Describes available resources and support systems  Outcome: Progressing Towards Goal     Problem: Diabetes Self-Management  Goal: *Disease process and treatment process  Description: Define diabetes and identify own type of diabetes; list 3 options for treating diabetes.   Outcome: Progressing Towards Goal  Goal: *Incorporating nutritional management into lifestyle  Description: Describe effect of type, amount and timing of food on blood glucose; list 3 methods for planning meals. Outcome: Progressing Towards Goal  Goal: *Incorporating physical activity into lifestyle  Description: State effect of exercise on blood glucose levels. Outcome: Progressing Towards Goal  Goal: *Developing strategies to promote health/change behavior  Description: Define the ABC's of diabetes; identify appropriate screenings, schedule and personal plan for screenings. Outcome: Progressing Towards Goal  Goal: *Using medications safely  Description: State effect of diabetes medications on diabetes; name diabetes medication taking, action and side effects. Outcome: Progressing Towards Goal  Goal: *Monitoring blood glucose, interpreting and using results  Description: Identify recommended blood glucose targets  and personal targets. Outcome: Progressing Towards Goal  Goal: *Prevention, detection, treatment of acute complications  Description: List symptoms of hyper- and hypoglycemia; describe how to treat low blood sugar and actions for lowering  high blood glucose level. Outcome: Progressing Towards Goal  Goal: *Prevention, detection and treatment of chronic complications  Description: Define the natural course of diabetes and describe the relationship of blood glucose levels to long term complications of diabetes.   Outcome: Progressing Towards Goal  Goal: *Developing strategies to address psychosocial issues  Description: Describe feelings about living with diabetes; identify support needed and support network  Outcome: Progressing Towards Goal  Goal: *Insulin pump training  Outcome: Progressing Towards Goal  Goal: *Sick day guidelines  Outcome: Progressing Towards Goal  Goal: *Patient Specific Goal (EDIT GOAL, INSERT TEXT)  Outcome: Progressing Towards Goal     Problem: Patient Education: Go to Patient Education Activity  Goal: Patient/Family Education  Outcome: Progressing Towards Goal

## 2022-03-14 ENCOUNTER — TELEPHONE (OUTPATIENT)
Dept: SURGERY | Age: 37
End: 2022-03-14

## 2022-03-14 NOTE — TELEPHONE ENCOUNTER
This RN spoke with patient post-operatively. Hydration: Patient has hydrated with 70 ounces as of yesterday. Nausea and/or vomitting: None    Pain: Currently managed with Percocet. He is also wearing a bariatric binder. Lovenox injections: Administering every 12 hours, rotating sites. RN reminded patient to complete all injections, in which patient verbalized understanding. Lap sites: No erythema, drainage, and/or swelling    MECHELLE drain site: No drainage; dressing being changed daily    BM: None to date, but is passing flatus. Ambulation: Patient is walking throughout house every hour. IS: Patient continues to use 10x's per hour while awake. He has reached 1,250 mL. Temperature: 98.6 degrees    Medications: (confirmed currently taking)   *Multi-vitamin: yes   *Probiotic: yes   *Prilosec: yes    Questions: RN informed patient of upcoming appointments with [facility] and reminded patient he can not have any steroid injections at this time. He verbalized understanding. This RN reminded the patient to contact the office with any questions and/or concerns. Patient verbalized understanding. Patient's two week post-op visit is scheduled and was confirmed.

## 2022-03-15 ENCOUNTER — HOSPITAL ENCOUNTER (OUTPATIENT)
Dept: LAB | Age: 37
Discharge: HOME | End: 2022-03-15

## 2022-03-15 ENCOUNTER — TELEPHONE (OUTPATIENT)
Dept: SURGERY | Age: 37
End: 2022-03-15

## 2022-03-15 DIAGNOSIS — Z98.84 STATUS POST BARIATRIC SURGERY: Primary | ICD-10-CM

## 2022-03-15 DIAGNOSIS — K90.89 OTHER SPECIFIED INTESTINAL MALABSORPTION: ICD-10-CM

## 2022-03-15 LAB — XX-LABCORP SPECIMEN COL,LCBCF: NORMAL

## 2022-03-15 PROCEDURE — 99001 SPECIMEN HANDLING PT-LAB: CPT

## 2022-03-15 NOTE — TELEPHONE ENCOUNTER
As per Gideon Fothergill, PA, patient needed a repeat BMP to assess kidney functioning. Patient aware and will have lab work drawn today.

## 2022-03-16 LAB
BUN SERPL-MCNC: 50 MG/DL (ref 6–20)
BUN/CREAT SERPL: 21 (ref 9–20)
CALCIUM SERPL-MCNC: 9.1 MG/DL (ref 8.7–10.2)
CHLORIDE SERPL-SCNC: 107 MMOL/L (ref 96–106)
CO2 SERPL-SCNC: 22 MMOL/L (ref 20–29)
CREAT SERPL-MCNC: 2.33 MG/DL (ref 0.76–1.27)
EGFR: 36 ML/MIN/1.73
GLUCOSE SERPL-MCNC: 99 MG/DL (ref 65–99)
POTASSIUM SERPL-SCNC: 5.3 MMOL/L (ref 3.5–5.2)
SODIUM SERPL-SCNC: 143 MMOL/L (ref 134–144)

## 2022-03-18 PROBLEM — D64.9 ANEMIA: Status: ACTIVE | Noted: 2020-01-05

## 2022-03-19 PROBLEM — E11.3593 PROLIFERATIVE DIABETIC RETINOPATHY OF BOTH EYES (HCC): Status: ACTIVE | Noted: 2018-06-19

## 2022-03-19 PROBLEM — E66.01 OBESITY, MORBID, BMI 40.0-49.9 (HCC): Status: ACTIVE | Noted: 2018-05-20

## 2022-03-19 PROBLEM — R06.83 SNORING: Status: ACTIVE | Noted: 2020-01-05

## 2022-03-19 PROBLEM — I10 ESSENTIAL HYPERTENSION: Status: ACTIVE | Noted: 2018-09-06

## 2022-03-19 PROBLEM — H35.033 HYPERTENSIVE RETINOPATHY OF BOTH EYES: Status: ACTIVE | Noted: 2018-10-08

## 2022-03-19 PROBLEM — E78.5 HYPERLIPIDEMIA LDL GOAL <70: Status: ACTIVE | Noted: 2018-12-14

## 2022-03-19 PROBLEM — N52.9 ERECTILE DYSFUNCTION: Status: ACTIVE | Noted: 2020-01-05

## 2022-03-19 PROBLEM — M47.816 LUMBAR FACET ARTHROPATHY: Status: ACTIVE | Noted: 2018-05-20

## 2022-03-19 PROBLEM — E11.9 CONTROLLED TYPE 2 DIABETES MELLITUS, WITHOUT LONG-TERM CURRENT USE OF INSULIN (HCC): Status: ACTIVE | Noted: 2018-06-19

## 2022-03-19 PROBLEM — H26.9 CATARACT OF BOTH EYES: Status: ACTIVE | Noted: 2018-10-08

## 2022-03-20 PROBLEM — N18.32 STAGE 3B CHRONIC KIDNEY DISEASE (HCC): Status: ACTIVE | Noted: 2021-09-12

## 2022-03-22 ENCOUNTER — OFFICE VISIT (OUTPATIENT)
Dept: SURGERY | Age: 37
End: 2022-03-22

## 2022-03-22 DIAGNOSIS — Z98.84 S/P LAPAROSCOPIC SLEEVE GASTRECTOMY: Primary | ICD-10-CM

## 2022-03-22 PROBLEM — K90.9 INTESTINAL MALABSORPTION: Status: ACTIVE | Noted: 2022-03-22

## 2022-03-22 NOTE — PROGRESS NOTES
Subjective:      Linda Reid is a 39 y.o. male is now 13 days status post laparoscopic sleeve gastrectomy. Doing well overall. He has lost a total of 30 pounds since surgery. Body mass index is 43.81 kg/m². Currently on a liquid diet without difficulty. Taking in 64oz water daily. Sources of protein include protein shakes. 30-60 min of activity 5 days a week, including walking or riding stationary bike. Bowel movements are regular. Pain is controlled without any medications. Having LLQ incisional pain as expected The patient is compliant with multivitamins. Surgery related complications: NA.  Liver bx report reviewed with patient. Stomach bx report reviewed with patient. Weight Loss Metrics 3/23/2022 3/10/2022 3/3/2022 2/28/2022 1/5/2022 12/8/2021 11/24/2021   Today's Wt 279 lb 11.2 oz 301 lb 8 oz 308 lb 308 lb 8 oz 303 lb 8 oz 301 lb 1.6 oz 303 lb 1.6 oz   BMI 43.81 kg/m2 47.22 kg/m2 49.71 kg/m2 49.79 kg/m2 48.99 kg/m2 48.6 kg/m2 48.92 kg/m2          Comorbidities:    Hypertension: improved, on Rx, denies lightheadedness or dizziness  Diabetes: improved, no Rx  Obstructive Sleep Apnea: unchanged  Hyperlipidemia: unchanged, on statin  Stress Urinary Incontinence: not applicable  Gastroesophageal Reflux: improved, on PPI  Weight related arthropathy:improved    Denies diagnosis of COVID-19 since surgery.      Patient Active Problem List   Diagnosis Code    Obesity, morbid, BMI 40.0-49.9 ([address] E66.01    Lumbar facet arthropathy M47.816    Controlled type 2 diabetes mellitus, without long-term current use of insulin ([address] E11.9    Proliferative diabetic retinopathy of both eyes ([address] T57.0710    Essential hypertension I10    Cataract of both eyes H26.9    Hypertensive retinopathy of both eyes H35.033    Hyperlipidemia LDL goal <70 E78.5    Erectile dysfunction N52.9    Snoring R06.83    Anemia D64.9    Stage 3b chronic kidney disease (HCC) N18.32    Hypertension I10    Diabetes ([address] E11.9    Morbid obesity ([facility]) E66.01    Morbid obesity with BMI of 45.0-49.9, adult ([facility]) E66.01, Z68.42    Diabetes mellitus ([facility] E11.9    Hx of diabetic foot ulcer Z86.31    HI (acute kidney injury) ([facility] N17.9    Acid reflux K21.9    Sleep apnea G47.30    Intestinal malabsorption K90.9    S/P laparoscopic sleeve gastrectomy Z98.84        Past Medical History:   Diagnosis Date    Acid reflux     avoids food triggers    HI (acute kidney injury) ([facility] 04/2021    Anemia 1/5/2020    Cataract of both eyes 10/8/2018    Chronic kidney disease     stage 3    Diabetes mellitus ([facility]     Dx'd 2018, HgA1c 9.4% 3/2021    Hx of diabetic foot ulcer     7/2021    Hyperlipidemia LDL goal <70 12/14/2018    Hypertension     Dx'd 2018    Morbid obesity ([facility]     Morbid obesity with BMI of 45.0-49.9, adult ([facility]     Proliferative diabetic retinopathy of both eyes ([facility] 6/19/2018    Sleep apnea     New diagnosis, waiting on CPAP    Stage 3b chronic kidney disease ([facility] 09/12/2021    Dr Caitlyn Spicer, nephrology       Past Surgical History:   Procedure Laterality Date    HX AMPUTATION TOE  2018    HX ORTHOPAEDIC Left     foot surgery x 3    HX ORTHOPAEDIC Left     amputation of toes x 5    HX ORTHOPAEDIC Left     infection foot, debridement    TN LAP, LUCIAN RESTRICT PROC, LONGITUDINAL GASTRECTOMY  03/10/2022    Dr Clinton Moreland       Current Outpatient Medications   Medication Sig Dispense Refill    multivitamin (ONE A DAY) tablet Take 1 Tablet by mouth daily.  B.animalis,bifid,infantis,long (Probiotic 4X) 10-15 mg TbEC Take  by mouth.  ursodioL (RAS Forte) 500 mg tablet Take 1 Tablet by mouth daily for 30 days. 30 Tablet 4    fluticasone propionate (FLONASE) 50 mcg/actuation nasal spray Use 2 spray(s) in each nostril once daily 16 g 5    losartan (COZAAR) 100 mg tablet Take 1 tablet by mouth once daily 90 Tablet 3    hydrALAZINE (APRESOLINE) 50 mg tablet Take 50 mg by mouth three (3) times daily.       ezetimibe (ZETIA) 10 mg tablet Take 1 tablet by mouth once daily (Patient taking differently: Take 10 mg by mouth nightly.) 90 Tablet 3    atorvastatin (LIPITOR) 40 mg tablet Take 1 tablet by mouth once daily (Patient taking differently: Take 40 mg by mouth nightly.) 90 Tablet 3    TRUEplus Lancets 33 gauge misc USE TO CHECK BLOOD SUGAR TWICE DAILY      carvediloL (Coreg) 25 mg tablet Take 1 Tablet by mouth two (2) times daily (with meals). 60 Tablet 5    Blood-Glucose Meter (True Metrix Glucose Meter) misc Check glucose twice daily. DX P25.42C9 50 Each 5    glucose blood VI test strips (blood glucose test) strip Check glucose twice daily. DX T09.90Y3 100 Strip 5    lancets misc USE TO CHECK BLOOD SUGAR TWICE DAILY 100 Each 5    Blood-Glucose Meter monitoring kit Use to check blood sugar 2 times per day.  1 Kit 0       Allergies   Allergen Reactions    Amlodipine Swelling     Had swelling in his legs and feet - states was painful to walk       Review of Symptoms:       General - No history or complaints of unexpected fever or chills  Head/Neck - No history or complaints of headache or dizziness  Cardiac - No history or complaints of chest pain, palpitations, or shortness of breath  Pulmonary - No history or complaints of shortness of breath or productive cough  Gastrointestinal - as noted above  Genitourinary - No history or complaints of hematuria/dysuria or renal lithiasis  Musculoskeletal - No history or complaints of joint  muscular weakness  Hematologic - No history of any bleeding episodes  Neurologic - No history or complaints of  migraine headaches or neurologic symptoms        Objective:     Visit Vitals  /70 (BP 1 Location: Left upper arm, BP Patient Position: Sitting, BP Cuff Size: Large adult)   Pulse 86   Temp 97.1 °F (36.2 °C)   Ht 5' 7\" (1.702 m)   Wt 126.9 kg (279 lb 11.2 oz)   SpO2 98%   BMI 43.81 kg/m²       General:  alert, cooperative, no distress, appears stated age   Chest: lungs clear to auscultation, breath sounds equal and symmetric, no rhonchi, rales or wheezes, no accessory muscle use   Cor:   Regular rate and rhythm, S1S2 present or without murmur or extra heart sounds   Abdomen: soft, bowel sounds active, non-tender, no masses or organomegaly   Incisions:   healing well, no drainage, no erythema, no hernia, no seroma, no swelling, no dehiscence, incision well approximated     A:  Stomach, sleeve resection:        Portion of oxyntic stomach with no pathologic change. No H. pylori organisms identified. B:  Stomach, prepyloric, biopsy:        Gastric antral mucosa with mild chronic inflammation. No H. pylori organisms identified. C:  Liver, wedge biopsy:        Liver with unremarkable morphology. Assessment:   History of Morbid obesity, status post laparoscopic sleeve gastrectomy. Doing well postoperatively. Plan:     1. Increase activity to the goal of 30 minutes daily  2. Advance diet to soft solid phase. Reminded to measure portions, continue high protein, low carbohydrate diet. Reminded to eat regularly, to eat slowly & not to drink with meals. Refer to the handbook given in class. 3. Continue multivitamin   4. Continue current medications and follow up with PCP for management of regimen. 5. Encouraged to attend support group   6. I have discussed this plan with patient and they verbalized understanding  7. Follow up in 2 weeks or sooner if patient has questions, concerns or worsening of condition, if unable to reach our office, patient should report to the ED. 8. Mr. Radha Chin has a reminder for a \"due or due soon\" health maintenance. I have asked that he contact his primary care provider for a follow-up on this health maintenance.

## 2022-03-22 NOTE — PROGRESS NOTES
Spoke with [address]  today. Pt is approx. 2 weeks S/P laparoscopic sleeve  gastrectomy procedure. Tolerating liquid diet very well. Protein shake intake: 2 Premier protein supplement shakes/d. Fluid intake: 64 oz/day. Foods being consumed include SF jello, broth, egg drop soup, SF pudding, and SF popsicles. No complaints. Wt: pt has not weighed himself. Pre-op Wt: 309 lbs    EBW: 154  lbs       Unable to calculate pts % EBWL as pt has not weighed himself. Activity: \"trying to do some walking around\", pt states that he has been trying to walk for 30 min or riding his stationary bike for 30-60 min/day x 5 d/wk, pt states that both of these are vigorous activities and raise his heart rate. Supplements:  [x] [place]'s Complete Chewable MVI BID  [x] Probiotic QD  [x]Prilosec QD    Pt given one on one diet education over the phone. Reviewed diet progression for weeks 3-4. Patient appears to have a good understanding of the diet progression, food choices, and dietary/exercise habits for successful weight loss and nourishment after surgery. Reviewed pp. 32-45 of the patient education book. Discussed: post-op diet progression, including soft/pureed high protein, low fat, low sugar food recommendations; proper food group choices;  consumption of food and liquids, and consumption of adequate no-sugar, no-caffeine, no carbonation liquids. We reviewed appropriate food choices, meal adaptations (use of smaller dishes/utensils, eat slowly and chewing sufficiently for digestion), cooking techniques, and eating behavior modifications. Discussed intake regimen with 3 meals and 2-3 protein supplements per day. Additionally, intake timing - to include consuming a meal or snack every 3-4 hrs, the 30:30 rule, and having a meal within 2 hours of waking .  Reinforced the importance of continued vitamin & probiotic consumption, adequate fluid with goal of 64 oz per day and adequate protein with goal of  grams per day. Stressed the importance of 30 min of physical activity each day, as tolerated, with restricted lifting, to improve post op weight loss results and bowel function. Pt voiced understanding through repeating the information back to me. All pt nutrition-related questions answered. Reminded pt of their appointment for their 2 week post-op medical evaluation  on 3/23/2022   at 3:00 PM .  Additionally reminded pt that a RD would be calling them again at their 1 mo. post-op tho. Pt provided with RD contact information for nutritional questions or concerns between now and then.     RD: Reshma Leos MS, RD/LD

## 2022-03-23 ENCOUNTER — OFFICE VISIT (OUTPATIENT)
Dept: SURGERY | Age: 37
End: 2022-03-23
Payer: MEDICAID

## 2022-03-23 VITALS
DIASTOLIC BLOOD PRESSURE: 70 MMHG | SYSTOLIC BLOOD PRESSURE: 136 MMHG | WEIGHT: 279.7 LBS | TEMPERATURE: 97.1 F | OXYGEN SATURATION: 98 % | BODY MASS INDEX: 43.9 KG/M2 | HEART RATE: 86 BPM | HEIGHT: 67 IN

## 2022-03-23 DIAGNOSIS — Z09 POSTOPERATIVE EXAMINATION: Primary | ICD-10-CM

## 2022-03-23 PROCEDURE — 99024 POSTOP FOLLOW-UP VISIT: CPT | Performed by: NURSE PRACTITIONER

## 2022-03-23 RX ORDER — CHOLECALCIFEROL (VITAMIN D3) 50 MCG
CAPSULE ORAL
COMMUNITY

## 2022-03-23 RX ORDER — BISMUTH SUBSALICYLATE 262 MG
1 TABLET,CHEWABLE ORAL DAILY
COMMUNITY

## 2022-03-23 RX ORDER — URSODIOL 500 MG/1
500 TABLET, FILM COATED ORAL DAILY
Qty: 30 TABLET | Refills: 4 | Status: SHIPPED | OUTPATIENT
Start: 2022-03-23 | End: 2022-03-29

## 2022-03-24 PROBLEM — Z98.84 S/P LAPAROSCOPIC SLEEVE GASTRECTOMY: Status: ACTIVE | Noted: 2022-03-22

## 2022-03-24 PROBLEM — K90.9 INTESTINAL MALABSORPTION: Status: ACTIVE | Noted: 2022-03-22
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
## 2022-03-29 ENCOUNTER — OFFICE VISIT (OUTPATIENT)
Dept: INTERNAL MEDICINE CLINIC | Age: 37
End: 2022-03-29
Payer: MEDICAID

## 2022-03-29 VITALS
HEART RATE: 82 BPM | WEIGHT: 276 LBS | BODY MASS INDEX: 43.32 KG/M2 | DIASTOLIC BLOOD PRESSURE: 67 MMHG | TEMPERATURE: 97.6 F | SYSTOLIC BLOOD PRESSURE: 120 MMHG | HEIGHT: 67 IN | OXYGEN SATURATION: 96 % | RESPIRATION RATE: 16 BRPM

## 2022-03-29 DIAGNOSIS — E11.37X9 CONTROLLED TYPE 2 DIABETES MELLITUS WITH DIABETIC MACULAR EDEMA RESOLVED AFTER TREATMENT, WITHOUT LONG-TERM CURRENT USE OF INSULIN, UNSPECIFIED LATERALITY (HCC): ICD-10-CM

## 2022-03-29 DIAGNOSIS — N18.32 STAGE 3B CHRONIC KIDNEY DISEASE (HCC): ICD-10-CM

## 2022-03-29 DIAGNOSIS — I10 ESSENTIAL HYPERTENSION: Primary | ICD-10-CM

## 2022-03-29 DIAGNOSIS — K90.9 INTESTINAL MALABSORPTION, UNSPECIFIED TYPE: ICD-10-CM

## 2022-03-29 PROCEDURE — 3044F HG A1C LEVEL LT 7.0%: CPT | Performed by: INTERNAL MEDICINE

## 2022-03-29 PROCEDURE — 99214 OFFICE O/P EST MOD 30 MIN: CPT | Performed by: INTERNAL MEDICINE

## 2022-03-29 NOTE — PATIENT INSTRUCTIONS
Body Mass Index: Care Instructions  Your Care Instructions     Body mass index (BMI) can help you see if your weight is raising your risk for health problems. It uses a formula to compare how much you weigh with how tall you are. · A BMI lower than 18.5 is considered underweight. · A BMI between 18.5 and 24.9 is considered healthy. · A BMI between 25 and 29.9 is considered overweight. A BMI of 30 or higher is considered obese. If your BMI is in the normal range, it means that you have a lower risk for weight-related health problems. If your BMI is in the overweight or obese range, you may be at increased risk for weight-related health problems, such as high blood pressure, heart disease, stroke, arthritis or joint pain, and diabetes. If your BMI is in the underweight range, you may be at increased risk for health problems such as fatigue, lower protection (immunity) against illness, muscle loss, bone loss, hair loss, and hormone problems. BMI is just one measure of your risk for weight-related health problems. You may be at higher risk for health problems if you are not active, you eat an unhealthy diet, or you drink too much alcohol or use tobacco products. Follow-up care is a key part of your treatment and safety. Be sure to make and go to all appointments, and call your doctor if you are having problems. It's also a good idea to know your test results and keep a list of the medicines you take. How can you care for yourself at home? · Practice healthy eating habits. This includes eating plenty of fruits, vegetables, whole grains, lean protein, and low-fat dairy. · If your doctor recommends it, get more exercise. Walking is a good choice. Bit by bit, increase the amount you walk every day. Try for at least 30 minutes on most days of the week. · Do not smoke. Smoking can increase your risk for health problems. If you need help quitting, talk to your doctor about stop-smoking programs and medicines. These can increase your chances of quitting for good. · Limit alcohol to 2 drinks a day for men and 1 drink a day for women. Too much alcohol can cause health problems. If you have a BMI higher than 25  · Your doctor may do other tests to check your risk for weight-related health problems. This may include measuring the distance around your waist. A waist measurement of more than 40 inches in men or 35 inches in women can increase the risk of weight-related health problems. · Talk with your doctor about steps you can take to stay healthy or improve your health. You may need to make lifestyle changes to lose weight and stay healthy, such as changing your diet and getting regular exercise. If you have a BMI lower than 18.5  · Your doctor may do other tests to check your risk for health problems. · Talk with your doctor about steps you can take to stay healthy or improve your health. You may need to make lifestyle changes to gain or maintain weight and stay healthy, such as getting more healthy foods in your diet and doing exercises to build muscle. Where can you learn more? Go to http://www.chong.com/  Enter S176 in the search box to learn more about \"Body Mass Index: Care Instructions. \"  Current as of: December 27, 2021               Content Version: 13.2  © 2006-2022 Healthwise, Incorporated. Care instructions adapted under license by [employer] (which disclaims liability or warranty for this information). If you have questions about a medical condition or this instruction, always ask your healthcare professional. [place] [address] any warranty or liability for your use of this information. Home Blood Pressure Test: About This Test  What is it? A home blood pressure test allows you to keep track of your blood pressure at home. Blood pressure is a measure of the force of blood against the walls of your arteries.  Blood pressure readings include two numbers, such as 130/80 (say \"130 over 80\"). The first number is the systolic pressure. The second number is the diastolic pressure. Why is this test done? You may do this test at home to:  · Find out if you have high blood pressure. · Track your blood pressure if you have high blood pressure. · Track how well medicine is working to reduce high blood pressure. · Check how lifestyle changes, such as weight loss and exercise, are affecting blood pressure. How do you prepare for the test?  For at least 30 minutes before you take your blood pressure, don't exercise, drink caffeine, or smoke. Empty your bladder before the test. Sit quietly with your back straight and both feet on the floor for at least 5 minutes. This helps you take your blood pressure while you feel comfortable and relaxed. How is the test done? · If your doctor recommends it, take your blood pressure twice a day. Take it in the morning and evening. · Sit with your arm slightly bent and resting on a table so that your upper arm is at the same level as your heart. · Use the same arm each time you take your blood pressure. · Place the blood pressure cuff on the bare skin of your upper arm. You may have to roll up your sleeve, remove your arm from the sleeve, or take your shirt off. · Wrap the blood pressure cuff around your upper arm so that the lower edge of the cuff is about 1 inch above the bend of your elbow. · Do not move, talk, or text while you take your blood pressure. Follow the instructions that came with your blood pressure monitor. They might be different from the following. · Press the on/off button on the automatic monitor. Then you may need to wait until the screen says the monitor is ready. · Press the start button. The cuff will inflate and deflate by itself. · Your blood pressure numbers will appear on the screen. · Wait one minute and take your blood pressure again.   · If your monitor does not automatically save your numbers, write them in your log book, along with the date and time. Follow-up care is a key part of your treatment and safety. Be sure to make and go to all appointments, and call your doctor if you are having problems. It's also a good idea to keep a list of the medicines you take. Where can you learn more? Go to http://www.chong.com/  Enter C427 in the search box to learn more about \"Home Blood Pressure Test: About This Test.\"  Current as of: January 10, 2022               Content Version: 13.2  © 8190-6565 Healthwise, [employer]. Care instructions adapted under license by [employer] (which disclaims liability or warranty for this information). If you have questions about a medical condition or this instruction, always ask your healthcare professional. [address] any warranty or liability for your use of this information.

## 2022-03-29 NOTE — PROGRESS NOTES
[facility]:  3/29/2022, MRN: [id number]      Karthik Fiore is a 39 y.o. male and presents to clinic for Follow-up, Diabetes, and Hypertension      Subjective: The patient is a 19-year-old male with history of hypertension, type 2 diabetes, lumbar facet arthropathy, obesity, left transmetatarsal amputation secondary to a diabetic foot infection, and HLD. 1. Type 2 DM and CKD: He underwent gastric sleeve surgery 3/10/22. He has very little abdominal pain and pain is limited to his left abdomen. He is no longer using pain rx. No drainage. No fever. He is adhering to a bariatric diet well. He was placed off of all of his DM medications. He used to take [place]. Recent labs show that his creatinine is 2.04 and his BUN is 50. Last year in September, his creatinine was 2.35 and his BUN was 50. His recent labs show that his A1C is 5. 4! His BG was 94. He is not on any DM rx since his surgery. Recent labs also showed a mildly elevated potassium, mildly low hemoglobin of 12.1, and normal LFTs. His total cholesterol is 145. Triglycerides are 157. HDL is 38. LDL is 80. Overall, his cholesterol has slightly improved. He lost saw Bariatrics 2 wks ago. His next apt: June. He was just placed on soft foods. He has a callus on his left foot. He has a f/u apt with Podiatry. 2. HTN: BP is 120/67. He continues to have microalbuminuria. He is followed by a Nephrology team. Taking coreg, losartan, and hydralazine.  +Dizzines with standing up too quickly.        Patient Active Problem List    Diagnosis Date Noted    Intestinal malabsorption 03/22/2022    S/P laparoscopic sleeve gastrectomy 03/22/2022    Sleep apnea     Hypertension     Diabetes ([address]     Morbid obesity ([address]     Morbid obesity with BMI of 45.0-49.9, adult ([address]     Diabetes mellitus ([address]     Hx of diabetic foot ulcer     HI (acute kidney injury) ([address]     Acid reflux     Stage 3b chronic kidney disease ([facility] 09/12/2021    Erectile dysfunction 01/05/2020    Snoring 01/05/2020    Anemia 01/05/2020    Hyperlipidemia LDL goal <70 12/14/2018    Cataract of both eyes 10/08/2018    Hypertensive retinopathy of both eyes 10/08/2018    Essential hypertension 09/06/2018    Controlled type 2 diabetes mellitus, without long-term current use of insulin ([facility] 06/19/2018    Proliferative diabetic retinopathy of both eyes ([facility] 06/19/2018    Obesity, morbid, BMI 40.0-49.9 ([facility] 05/20/2018    Lumbar facet arthropathy 05/20/2018       Current Outpatient Medications   Medication Sig Dispense Refill    carvediloL (COREG) 25 mg tablet TAKE 1 TABLET BY MOUTH TWICE DAILY WITH MEALS 60 Tablet 5    multivitamin (ONE A DAY) tablet Take 1 Tablet by mouth daily.  B.animalis,bifid,infantis,long (Probiotic 4X) 10-15 mg TbEC Take  by mouth.  fluticasone propionate (FLONASE) 50 mcg/actuation nasal spray Use 2 spray(s) in each nostril once daily 16 g 5    losartan (COZAAR) 100 mg tablet Take 1 tablet by mouth once daily 90 Tablet 3    hydrALAZINE (APRESOLINE) 50 mg tablet Take 50 mg by mouth three (3) times daily.  ezetimibe (ZETIA) 10 mg tablet Take 1 tablet by mouth once daily (Patient taking differently: Take 10 mg by mouth nightly.) 90 Tablet 3    atorvastatin (LIPITOR) 40 mg tablet Take 1 tablet by mouth once daily (Patient taking differently: Take 40 mg by mouth nightly.) 90 Tablet 3    TRUEplus Lancets 33 gauge misc USE TO CHECK BLOOD SUGAR TWICE DAILY      Blood-Glucose Meter (True Metrix Glucose Meter) misc Check glucose twice daily. DX X16.98M2 50 Each 5    glucose blood VI test strips (blood glucose test) strip Check glucose twice daily. DX J03.87O7 100 Strip 5    lancets misc USE TO CHECK BLOOD SUGAR TWICE DAILY 100 Each 5    Blood-Glucose Meter monitoring kit Use to check blood sugar 2 times per day. 1 Kit 0    ursodioL (RAS Forte) 500 mg tablet Take 1 Tablet by mouth daily for 30 days. (Patient not taking: Reported on 3/29/2022) 30 Tablet 4       Allergies   Allergen Reactions    Amlodipine Swelling     Had swelling in his legs and feet - states was painful to walk       Past Medical History:   Diagnosis Date    Acid reflux     avoids food triggers    [facility]) [facility] 2021    Anemia 2020    Cataract of both eyes 10/8/2018    Chronic kidney disease     stage 3    Diabetes mellitus ([address]     Dx'd , HgA1c 9.4% 3/2021    Hx of diabetic foot ulcer     2021    Hyperlipidemia LDL goal <70 2018    Hypertension     Dx'd 2018    Morbid obesity ([address]     Morbid obesity with BMI of 45.0-49.9, adult ([facility]     Proliferative diabetic retinopathy of both eyes ([facility] 2018    Sleep apnea     New diagnosis, waiting on CPAP    Stage 3b chronic kidney disease ([address] 2021    Dr Reny Gerber, nephrology       Past Surgical History:   Procedure Laterality Date    HX AMPUTATION TOE  2018    HX ORTHOPAEDIC Left     foot surgery x 3    HX ORTHOPAEDIC Left     amputation of toes x 5    HX ORTHOPAEDIC Left     infection foot, debridement    WI LAP, LUCIAN RESTRICT PROC, LONGITUDINAL GASTRECTOMY  03/10/2022    Dr Lenny Ewing       Family History   Problem Relation Age of Onset    Kidney Disease Mother         ESRD - HD       Social History     Tobacco Use    Smoking status: Former Smoker     Quit date:      Years since quittin.2    Smokeless tobacco: Never Used   Substance Use Topics    Alcohol use: No       ROS   Review of Systems   Constitutional: Negative for chills and fever. HENT: Negative for ear pain and sore throat. Eyes: Negative for blurred vision and pain. Respiratory: Negative for cough and shortness of breath. Cardiovascular: Negative for chest pain. Gastrointestinal: Positive for abdominal pain. Negative for blood in stool and melena. Genitourinary: Negative for dysuria and hematuria. Musculoskeletal: Positive for joint pain. Negative for myalgias. Skin: Positive for rash. Neurological: Negative for headaches. Endo/Heme/Allergies: Does not bruise/bleed easily. Psychiatric/Behavioral: Negative for substance abuse. Objective     Vitals:    03/29/22 1256   BP: 120/67   Pulse: 82   Resp: 16   Temp: 97.6 °F (36.4 °C)   TempSrc: Temporal   SpO2: 96%   Weight: 276 lb (125.2 kg)   Height: 5' 7\" (1.702 m)   PainSc:   0 - No pain       Physical Exam  Vitals and nursing note reviewed. Constitutional:       Appearance: Normal appearance. HENT:      Head: Normocephalic and atraumatic. Right Ear: External ear normal.      Left Ear: External ear normal.   Eyes:      Extraocular Movements: Extraocular movements intact. Conjunctiva/sclera: Conjunctivae normal.   Cardiovascular:      Rate and Rhythm: Normal rate and regular rhythm. Pulses: Normal pulses. Heart sounds: Normal heart sounds. Pulmonary:      Effort: Pulmonary effort is normal.      Breath sounds: Normal breath sounds. Abdominal:      General: Abdomen is flat. Bowel sounds are normal. There is no distension. Palpations: Abdomen is soft. Tenderness: There is no abdominal tenderness. Musculoskeletal:         General: No swelling (BUE) or tenderness (BUE). Cervical back: Neck supple. Lymphadenopathy:      Cervical: No cervical adenopathy. Skin:     General: Skin is warm and dry. Findings: No erythema. Neurological:      Mental Status: He is alert. Mental status is at baseline.       Gait: Gait normal.   Psychiatric:         Mood and Affect: Mood normal.         LABS   Data Review:   Lab Results   Component Value Date/Time    WBC 7.5 03/07/2022 09:58 AM    HGB 12.1 (L) 03/07/2022 09:58 AM    HCT 38.2 03/07/2022 09:58 AM    PLATELET 263 79/26/8951 09:58 AM    MCV 83 03/07/2022 09:58 AM       Lab Results   Component Value Date/Time    Sodium 143 03/15/2022 10:00 AM    Potassium 5.3 (H) 03/15/2022 10:00 AM    Chloride 107 (H) 03/15/2022 10:00 AM    CO2 22 03/15/2022 10:00 AM    Anion gap 7 03/11/2022 08:30 AM    Glucose 99 03/15/2022 10:00 AM    BUN 50 (H) 03/15/2022 10:00 AM    Creatinine 2.33 (H) 03/15/2022 10:00 AM    BUN/Creatinine ratio 21 (H) 03/15/2022 10:00 AM    GFR est AA 36 (L) 03/11/2022 08:30 AM    GFR est non-AA 29 (L) 03/11/2022 08:30 AM    Calcium 9.1 03/15/2022 10:00 AM       Lab Results   Component Value Date/Time    Cholesterol, total 145 03/07/2022 09:58 AM    HDL Cholesterol 38 (L) 03/07/2022 09:58 AM    LDL, calculated 80 03/07/2022 09:58 AM    LDL, calculated 156 (H) 07/24/2020 09:46 AM    VLDL, calculated 27 03/07/2022 09:58 AM    VLDL, calculated 69 (H) 07/24/2020 09:46 AM    Triglyceride 157 (H) 03/07/2022 09:58 AM       Lab Results   Component Value Date/Time    Hemoglobin A1c 5.4 03/07/2022 09:58 AM       Assessment/Plan:   1. Essential hypertension: Stable. +Losing weight. ?Orthostatic hypotension?  - Ok to c/w rx for now. - I encouraged him to check his BP bid and to notify me if his SBP is <110. Even if he is not symptomatic, his rx should be de-escalated which was discussed with him today  - Checking a f/u CMP  - I encouraged him to take his time with changing positions. ORDERS:  - METABOLIC PANEL, COMPREHENSIVE; Future    2. Controlled type 2 diabetes mellitus: A1C is 5. 4! +Losing weight. S/p sleeve surgery. +? Malabsorption predisposition? +CKD. - Checking labs later this year. - Continue to f/u with Nephrology. - I encouraged him to maintain a bariatric diet and to f/u with Bariatrics for further management/recommendations. - I encouraged him to f/u with Podiatry     ORDERS:  - HEMOGLOBIN A1C WITH EAG; Future  - CBC WITH AUTOMATED DIFF;  Future      Health Maintenance Due   Topic Date Due    Hepatitis C Screening  Never done    Foot Exam Q1  01/19/2022     Lab review: labs are reviewed in the EHR and ordered as mentioned above    I have discussed the diagnosis with the patient and the intended plan as seen in the above orders. The patient has received an after-visit summary and questions were answered concerning future plans. I have discussed medication side effects and warnings with the patient as well. I have reviewed the plan of care with the patient, accepted their input and they are in agreement with the treatment goals. All questions were answered. The patient understands the plan of care. Handouts provided today with above information. Pt instructed if symptoms worsen to call the office or report to the ED for continued care. Greater than 50% of the visit time was spent in counseling and/or coordination of care. Voice recognition was used to generate this report, which may have resulted in some phonetic based errors in grammar and contents. Even though attempts were made to correct all the mistakes, some may have been missed, and remained in the body of the document.           Anat Akins MD

## 2022-03-29 NOTE — PROGRESS NOTES
Arabella Clemens presents today for   Chief Complaint   Patient presents with    Follow-up    Diabetes    Hypertension       1. \"Have you been to the ER, urgent care clinic since your last visit? Hospitalized since your last visit? \" no    2. \"Have you seen or consulted any other health care providers outside of the [address] [place] [address] since your last visit? \" no     3. For patients aged 39-70: Has the patient had a colonoscopy / FIT/ Cologuard? NA - based on age      If the patient is female:    4. For patients aged 41-77: Has the patient had a mammogram within the past 2 years? NA - based on age or sex  See top three    5. For patients aged 21-65: Has the patient had a pap smear?  Yes - no Care Gap present

## 2022-03-29 NOTE — PROGRESS NOTES
Chon Duran presents today for No chief complaint on file. F/u  3-7-22    1. \"Have you been to the ER, urgent care clinic since your last visit? Hospitalized since your last visit? \" no    2. \"Have you seen or consulted any other health care providers outside of the [address] [place] [address] since your last visit? \" yes     3. For patients aged 39-70: Has the patient had a colonoscopy / FIT/ Cologuard? NA - based on age      If the patient is female:    4. For patients aged 41-77: Has the patient had a mammogram within the past 2 years? NA - based on age or sex  See top three    5. For patients aged 21-65: Has the patient had a pap smear?  NA - based on age or sex

## 2022-04-15 ENCOUNTER — DOCUMENTATION ONLY (OUTPATIENT)
Dept: SURGERY | Age: 37
End: 2022-04-15

## 2022-04-15 ENCOUNTER — TELEPHONE (OUTPATIENT)
Dept: SURGERY | Age: 37
End: 2022-04-15

## 2022-04-15 NOTE — PROGRESS NOTES
Patient is a 39 y.o. yo male approx. 1 mo S/P laparoscopic sleeve gastrectomy procedure. There were no vitals taken for this visit., Pt reports that he has not weighed himself since he was in the clinic for his 2 week post-op provider appointment. Visit was virtual.     Pre-op Wt: 309 lbs  EBW: 154 lbs    Unable to assess pts %EBWL due to his not having weighed himself. Pt is currently on a soft food diet without difficulty. Patient is hydrating with  64+   ounces, daily. Patient is tolerating the following sources of protein:  2 Premier/Pure Protein protein supplement shakes/day,  chicken, turkey, and  boiled eggs. Patient is exercising at gym 30 min/day of cardio x 5d/wk and \"light weight lifting 3x/wk. Patient [] has  [x] has not had any readmissions, reoperations, complications, ED visits, nor IVF at [facility] during her first post-op month. Pt []is [x]is not taking his Prilosec. Recommended that pt review directions given on duration he was instructed to take this medication and call the nurse line at the clinic if he was unsure of duration. Reminded pt that this medication has a specific purpose to his surgery and that he should not just stop it without first discussing with Dr Quita Fuller. Supplements:  [x] Flintstones Complete MVI  BID  [x] Probiotic        Patient was reminded of the followin. Start 500mg [employer] today, stop after 6 months out from surgery  2. Can stop probiotic, if desired, or needed for economical reasons. 3. Advance diet to solid phase. Reminded to measure portions, continue high protein, low carbohydrate diet. Reminded to eat regularly, to eat slowly & not to drink with meals. Refer to the handbook and video.   4. Continue multi-vitamin with iron and add the additional vitamin supplementation (calcium citrate 1,500 mg per day taken one hour apart from your other vitamins/supplements, vitamin B complex one a day, vitamin B12 1,000 mcg daily taken sublingually, vitamin D3 3,000 IU per day, all listed in handbook)  5. Continue current medications and follow up with PCP for management of regimen  6. Continue cardio exercise and add resistance exercises. Discussed with patient. Minimum of 30 minutes of exercise daily, five days a week  7. Encouraged to attend support group   8. I have discussed this plan with patient, and they verbalized understanding. 9. Follow-up at three month appointment on 6/14/2022 at 2:00 PM, or sooner if patient has questions, concerns or worsening of condition. If unable to reach our office, patient should report to the ED. 10. One month nutrition video to include the above mentioned education e-mailed to patient. 11. Patient received the nutrition educational folder to include the above mentioned education during their two week post-op appointment.     RD: Frances Welsh MS, RD/JIN

## 2022-04-15 NOTE — TELEPHONE ENCOUNTER
As per [facility], RD, this RN contacted patient re: the importance of taking Prilosec 20 mg once daily for one more month to promote healing. He verbalized understanding and stated he already took it.

## 2022-06-07 DIAGNOSIS — E11.37X9 CONTROLLED TYPE 2 DIABETES MELLITUS WITH DIABETIC MACULAR EDEMA RESOLVED AFTER TREATMENT, WITHOUT LONG-TERM CURRENT USE OF INSULIN, UNSPECIFIED LATERALITY (HCC): ICD-10-CM

## 2022-06-07 DIAGNOSIS — I10 ESSENTIAL HYPERTENSION: ICD-10-CM

## 2022-06-08 ENCOUNTER — APPOINTMENT (OUTPATIENT)
Dept: INTERNAL MEDICINE CLINIC | Age: 37
End: 2022-06-08

## 2022-06-08 DIAGNOSIS — K90.9 INTESTINAL MALABSORPTION, UNSPECIFIED TYPE: ICD-10-CM

## 2022-06-09 ENCOUNTER — HOSPITAL ENCOUNTER (OUTPATIENT)
Dept: LAB | Age: 37
Discharge: HOME | End: 2022-06-09

## 2022-06-09 LAB
ALBUMIN SERPL-MCNC: 4.1 G/DL (ref 4–5)
ALBUMIN/GLOB SERPL: 1.6 {RATIO} (ref 1.2–2.2)
ALP SERPL-CCNC: 76 IU/L (ref 44–121)
ALT SERPL-CCNC: 15 IU/L (ref 0–44)
AST SERPL-CCNC: 15 IU/L (ref 0–40)
BASOPHILS # BLD AUTO: 0 X10E3/UL (ref 0–0.2)
BASOPHILS NFR BLD AUTO: 1 %
BILIRUB SERPL-MCNC: 0.4 MG/DL (ref 0–1.2)
BUN SERPL-MCNC: 32 MG/DL (ref 6–20)
BUN/CREAT SERPL: 18 (ref 9–20)
CALCIUM SERPL-MCNC: 9.3 MG/DL (ref 8.7–10.2)
CHLORIDE SERPL-SCNC: 107 MMOL/L (ref 96–106)
CO2 SERPL-SCNC: 18 MMOL/L (ref 20–29)
CREAT SERPL-MCNC: 1.78 MG/DL (ref 0.76–1.27)
EGFR: 50 ML/MIN/1.73
EOSINOPHIL # BLD AUTO: 0.3 X10E3/UL (ref 0–0.4)
EOSINOPHIL NFR BLD AUTO: 5 %
ERYTHROCYTE [DISTWIDTH] IN BLOOD BY AUTOMATED COUNT: 14 % (ref 11.6–15.4)
EST. AVERAGE GLUCOSE BLD GHB EST-MCNC: 100 MG/DL
GLOBULIN SER CALC-MCNC: 2.6 G/DL (ref 1.5–4.5)
GLUCOSE SERPL-MCNC: 91 MG/DL (ref 65–99)
HBA1C MFR BLD: 5.1 % (ref 4.8–5.6)
HCT VFR BLD AUTO: 36 % (ref 37.5–51)
HGB BLD-MCNC: 11.3 G/DL (ref 13–17.7)
IMM GRANULOCYTES # BLD AUTO: 0 X10E3/UL (ref 0–0.1)
IMM GRANULOCYTES NFR BLD AUTO: 0 %
INTERPRETATION: NORMAL
LYMPHOCYTES # BLD AUTO: 2 X10E3/UL (ref 0.7–3.1)
LYMPHOCYTES NFR BLD AUTO: 32 %
MCH RBC QN AUTO: 27.1 PG (ref 26.6–33)
MCHC RBC AUTO-ENTMCNC: 31.4 G/DL (ref 31.5–35.7)
MCV RBC AUTO: 86 FL (ref 79–97)
MONOCYTES # BLD AUTO: 0.5 X10E3/UL (ref 0.1–0.9)
MONOCYTES NFR BLD AUTO: 8 %
NEUTROPHILS # BLD AUTO: 3.3 X10E3/UL (ref 1.4–7)
NEUTROPHILS NFR BLD AUTO: 54 %
PLATELET # BLD AUTO: 275 X10E3/UL (ref 150–450)
POTASSIUM SERPL-SCNC: 5.2 MMOL/L (ref 3.5–5.2)
PROT SERPL-MCNC: 6.7 G/DL (ref 6–8.5)
RBC # BLD AUTO: 4.17 X10E6/UL (ref 4.14–5.8)
SODIUM SERPL-SCNC: 141 MMOL/L (ref 134–144)
WBC # BLD AUTO: 6.1 X10E3/UL (ref 3.4–10.8)
XX-LABCORP SPECIMEN COL,LCBCF: NORMAL

## 2022-06-09 PROCEDURE — 99001 SPECIMEN HANDLING PT-LAB: CPT

## 2022-06-12 NOTE — PROGRESS NOTES
I will let him know at his upcoming appointment that his A1c is normal.  His CBC shows a mild anemia with a hemoglobin of 11.3. MCV is normal.  RDW is normal.  Meanwhile, his creatinine is down to 1.78. His BUN is 32. Note that 2 months ago, his creatinine was 2.33 and his BUN was 50.     Dr. Arelia Litten  [facility], [address] [place] [address] Str.  Phone: [phone]  Fax: [phone]

## 2022-06-14 ENCOUNTER — OFFICE VISIT (OUTPATIENT)
Dept: SURGERY | Age: 37
End: 2022-06-14
Payer: MEDICAID

## 2022-06-14 VITALS
HEART RATE: 76 BPM | DIASTOLIC BLOOD PRESSURE: 69 MMHG | OXYGEN SATURATION: 100 % | BODY MASS INDEX: 40.32 KG/M2 | HEIGHT: 67 IN | WEIGHT: 256.9 LBS | SYSTOLIC BLOOD PRESSURE: 139 MMHG | TEMPERATURE: 97.1 F

## 2022-06-14 DIAGNOSIS — K90.9 INTESTINAL MALABSORPTION, UNSPECIFIED TYPE: Primary | ICD-10-CM

## 2022-06-14 DIAGNOSIS — Z98.84 S/P LAPAROSCOPIC SLEEVE GASTRECTOMY: ICD-10-CM

## 2022-06-14 PROCEDURE — 99214 OFFICE O/P EST MOD 30 MIN: CPT | Performed by: NURSE PRACTITIONER

## 2022-06-14 RX ORDER — CALCIUM CARBONATE 500(1250)
TABLET ORAL DAILY
COMMUNITY

## 2022-06-14 RX ORDER — MULTIVIT WITH MINERALS/HERBS
1 TABLET ORAL DAILY
COMMUNITY

## 2022-06-14 RX ORDER — MAGNESIUM 200 MG
1000 TABLET ORAL DAILY
COMMUNITY

## 2022-06-14 RX ORDER — GLUCOSAMINE SULFATE 1500 MG
POWDER IN PACKET (EA) ORAL DAILY
COMMUNITY

## 2022-06-14 NOTE — PATIENT INSTRUCTIONS
[employer] or My Fitness Pal [place]  90-100g protein  800-1000 calories   Keep carbs below 50g                                                                  Patient Instructions      1. Remember hydration goals - minimum of 64 ounces of liquids per day (dehydration is the number one reason for hospital readmission). 2. Continue to monitor carbohydrate and protein intake you need a minimum of  Grams of protein daily- remember to keep your total carbohydrates to 50 grams or less per day for best results. 3. Continue to work towards exercise goals - 60-90 minutes, 5 times a week minimum of deliberate, aerobic exercise is the ultimate goal with strength training 2 times each week. Refer to [facility] for  information. 4. Remember to take vitamins as directed. 5. Attend support group the 2nd Thursday of each month. 6.  Constipation: Milk of Magnesia is for immediate relief only. Miralax is to be used every day if constipation is a chronic problem. 7.  Diarrhea: patients will occasionally develop lactose intolerance after surgery. Check to see if your protein shake has whey in it. If it does try a protein powder or drink that does not have whey and stop all yogurts, cheeses and milks to see if the diarrhea goes away. 8.  If you have had labs drawn. We will only call you if you have abnormal results. Otherwise you can access the lab results in \"[employer]\". You will only need the access code the first time you sign on. 9.  Call us at [phone] or email us through [email or web address]" with questions,     concerns or worsening of condition, we have someone on call 24 hours a day. If you are unable to reach our office, you are to go to your Primary Care Physician or the Emergency Department.      NOTE TO GASTRIC BYPASS PATIENTS:  (SAME APPLIES TO GASTRIC SLEEVE PATIENTS FOR FIRST TWO MONTHS)  Remember that for the rest of your life, you are not able to take the following:  - NSAIDs (ibuprofen, goody powder, BC powder, Motrin, Advil, Mobic, Voltaren, Excedrin, etc.)  - Steroid pills or injections  - Smoke (cigarettes or recreational drugs)  - Alcohol  Use of any of the above may cause ulcers in your stomach which may perforate causing a medical emergency and surgery. Speak to our medical staff if another medical provider requires you to take steroids or NSAIDs. Supplement Resource Guide    Importance of Protein:   Maintains lean body mass, produces antibodies to fight off infections, heals wounds, minimizes hair loss, helps to give you energy, helps with satiety, and keeping you full between meals. Importance of Calcium:  Needed for healthy bones and teeth, normal blood clotting, and nervous system functioning, higher risk of osteoporosis and bone disease with non-compliance. Importance of Multivitamins: Many functions. Supply you with extra nutrients that you may be missing from food. May lead to iron deficiency anemia, weakness, fatigue, and many other symptoms with non-compliance. Importance of B Vitamins:  Important for red blood cell formation, metabolism, energy, and helps to maintain a healthy nervous system. Protein Supplement  Find one you like now. Use immediately after surgery. Look for:  35-50g protein each day from your protein supplement once you reach the progression diet. 0-3 g fat per serving  0-3 g sugar per serving    Protein drinks should be split in separate dosages. Recommend: Lifelong  1 year + Calcium Supplement:     Start taking within a month after surgery. Look for: Calcium Citrate Plus D (1500 mg per day)  Recommend: Citracal     .            Avoid chocolate chewable calcium. Can use chewable bariatric or [employer] brand or similar chewable. The body cannot absorb more than 500-600 mg of calcium at a time.       Take for Life Multi-vitamin Supplement:      Start immediately after surgery: any complete chewable, such as: [employer]s Complete chewables. Avoid [place] sours or gummies. They lack iron and other important nutrients and also have added sugar. Continue with chewable vitamin or change to adult complete multivitamin one month after surgery. Menstruating women can take a prenatal vitamin. Make sure has at least 18 mg iron and 311-016 mcg folic acid   Vitamin Y25, B Complex Vitamin, and Biotin  Start taking within a month after surgery. Vitamin B12:  1000 mcg of Vitamin B12 three times weekly    Must take sublingually (meaning you take it under your tongue) or in a liquid drop form for easy absorption. B Complex Vitamin: Take a pill or liquid drop form once daily. Biotin: This vitamin can help prevent hair loss. Recommend 5mg   (5000 mcg) a day  Biotin is Optional              Learning About Being Physically Active  What is physical activity? Being physically active means doing any kind of activity that gets your body moving. The types of physical activity that can help you get fit and stay healthy include:  · Aerobic or \"cardio\" activities. These make your heart beat faster and make you breathe harder, such as brisk walking, riding a bike, or running. They strengthen your heart and lungs and build up your endurance. · Strength training activities. These make your muscles work against, or \"resist,\" something. Examples include lifting weights or doing push-ups. These activities help tone and strengthen your muscles and bones. · Stretches. These let you move your joints and muscles through their full range of motion. Stretching helps you be more flexible. What are the benefits of being active? Being active is one of the best things you can do for your health. It helps you to:  · Feel stronger and have more energy to do all the things you like to do. · Focus better at school or work. · Feel, think, and sleep better. · Reach and stay at a healthy weight. · Lose fat and build lean muscle.   · Lower your risk for serious health problems, including diabetes, heart attack, high blood pressure, and some cancers. · Keep your heart, lungs, bones, muscles, and joints strong and healthy. How can you make being active part of your life? Start slowly. Make it your long-term goal to get at least 30 minutes of exercise on most days of the week. Walking is a good choice. You also may want to do other activities, such as running, swimming, cycling, or playing tennis or team sports. Pick activities that you like--ones that make your heart beat faster, your muscles stronger, and your muscles and joints more flexible. If you find more than one thing you like doing, do them all. You don't have to do the same thing every day. Get your heart pumping every day. Any activity that makes your heart beat faster and keeps it at that rate for a while counts. Here are some great ways to get your heart beating faster:  · Go for a brisk walk, run, or bike ride. · Go for a hike or swim. · Go in-line skating. · Play a game of touch football, basketball, or soccer. · Ride a bike. · Play tennis or racquetball. · Climb stairs. Even some household chores can be aerobic--just do them at a faster pace. Vacuuming, raking or mowing the lawn, sweeping the garage, and washing and waxing the car all can help get your heart rate up. Strengthen your muscles during the week. You don't have to lift heavy weights or grow big, bulky muscles to get stronger. Doing a few simple activities that make your muscles work against, or \"resist,\" something can help you get stronger. For example, you can:  · Do push-ups or sit-ups, which use your own body weight as resistance. · Lift weights or dumbbells or use stretch bands at home or in a gym or community center. Stretch your muscles often. Stretching will help you as you become more active. It can help you stay flexible, loosen tight muscles, and avoid injury.  It can also help improve your balance and posture and can be a great way to relax. Be sure to stretch the muscles you'll be using when you work out. It's best to warm your muscles slightly before you stretch them. Walk or do some other light aerobic activity for a few minutes, and then start stretching. When you stretch your muscles:  · Do it slowly. Stretching is not about going fast or making sudden movements. · Don't push or bounce during a stretch. · Hold each stretch for at least 15 to 30 seconds, if you can. You should feel a stretch in the muscle, but not pain. · Breathe out as you do the stretch. Then breathe in as you hold the stretch. Don't hold your breath. If you're worried about how more activity might affect your health, have a checkup before you start. Follow any special advice your doctor gives you for getting a smart start. Where can you learn more? Go to http://www.gray.com/  Enter [id number] in the search box to learn more about \"Learning About Being Physically Active. \"  Current as of: May 12, 2021               Content Version: 13.2  © 6525-1421 [employer]. Care instructions adapted under license by [employer] (which disclaims liability or warranty for this information). If you have questions about a medical condition or this instruction, always ask your healthcare professional. [address] any warranty or liability for your use of this information.

## 2022-06-14 NOTE — PROGRESS NOTES
Subjective:      Vane Rodriguez is a 39 y.o. male is now 3 months status post laparoscopic sleeve gastrectomy. Doing well overall. He has lost a total of 53 pounds since surgery. Body mass index is 40.24 kg/m². Has lost 32% of EBW. Currently on a solid food diet without difficulty, reports rare vomiting if he eats too much and denies abdominal pain, difficulty swallowing, nausea and reflux. The patients diet choices have been reviewed today and counseling was given. Fluid intake: good, 70 oz      Protein intake: 2 shakes + food; chicken, ground turkey      Meals/day: 1-2  Eats first in the day around mid afternoon, after 1st shake around 8am     30-60 min of activity 6 days a week, including cardio and weight lifting. Bowel movements are regular. The patient is not having any pain. . The patient is compliant with multivitamins, calcium, Vit D and B12 supplements.      Weight Loss Metrics 6/14/2022 3/29/2022 3/23/2022 3/10/2022 3/3/2022 2/28/2022 1/5/2022   Today's Wt 256 lb 14.4 oz 276 lb 279 lb 11.2 oz 301 lb 8 oz 308 lb 308 lb 8 oz 303 lb 8 oz   BMI 40.24 kg/m2 43.23 kg/m2 43.81 kg/m2 47.22 kg/m2 49.71 kg/m2 49.79 kg/m2 48.99 kg/m2          Comorbidities:    Hypertension: improved, on decreased Rx, denies lightheadedness or dizziness  Diabetes: improved, no Rx  Obstructive Sleep Apnea: unchanged  Hyperlipidemia: unchanged, on statin  Stress Urinary Incontinence: not applicable  Gastroesophageal Reflux: improved, on PPI  Weight related arthropathy:improved     Patient Active Problem List   Diagnosis Code    Obesity, morbid, BMI 40.0-49.9 ([facility]) E66.01    Lumbar facet arthropathy M47.816    Controlled type 2 diabetes mellitus, without long-term current use of insulin ([facility]) E11.9    Proliferative diabetic retinopathy of both eyes ([facility] [address] K42.4211    Essential hypertension I10    Cataract of both eyes H26.9    Hypertensive retinopathy of both eyes H35.033    Hyperlipidemia LDL goal <70 E78.5  Erectile dysfunction N52.9    Snoring R06.83    Anemia D64.9    Stage 3b chronic kidney disease (HCC) N18.32    Hx of diabetic foot ulcer Z86.31    Acid reflux K21.9    Sleep apnea G47.30    Intestinal malabsorption K90.9    S/P laparoscopic sleeve gastrectomy Z98.84        Past Medical History:   Diagnosis Date    Acid reflux     avoids food triggers    HI (acute kidney injury) ([facility] [address] 04/2021    Anemia 1/5/2020    Cataract of both eyes 10/8/2018    Chronic kidney disease     stage 3    Diabetes mellitus ([facility] [address]     Dx'd 2018, HgA1c 9.4% 3/2021    Hx of diabetic foot ulcer     7/2021    Hyperlipidemia LDL goal <70 12/14/2018    Hypertension     Dx'd 2018    Morbid obesity ([facility] [address]     Morbid obesity with BMI of 45.0-49.9, adult ([facility] [address]     Proliferative diabetic retinopathy of both eyes ([facility] [address] 6/19/2018    Sleep apnea     New diagnosis, waiting on CPAP    Stage 3b chronic kidney disease ([facility] [address] 09/12/2021    Dr Zayda Johnson, nephrology       Past Surgical History:   Procedure Laterality Date    HX AMPUTATION TOE  2018    HX ORTHOPAEDIC Left     foot surgery x 3    HX ORTHOPAEDIC Left     amputation of toes x 5    HX ORTHOPAEDIC Left     infection foot, debridement    [place] LAP, LUCIAN RESTRICT PROC, LONGITUDINAL GASTRECTOMY  03/10/2022    Dr Cathy Rivera       Current Outpatient Medications   Medication Sig Dispense Refill    cyanocobalamin (VITAMIN B-12) 1,000 mcg sublingual tablet Take 1,000 mcg by mouth daily.  b complex vitamins tablet Take 1 Tablet by mouth daily.  cholecalciferol (Vitamin D3) 25 mcg (1,000 unit) cap Take  by mouth daily.  calcium carbonate (OS-RYAN) 500 mg calcium (1,250 mg) tablet Take  by mouth daily.  carvediloL (COREG) 25 mg tablet TAKE 1 TABLET BY MOUTH TWICE DAILY WITH MEALS 60 Tablet 5    multivitamin (ONE A DAY) tablet Take 1 Tablet by mouth daily.  B.animalis,bifid,infantis,long (Probiotic 4X) 10-15 mg TbEC Take  by mouth.       fluticasone propionate (FLONASE) 50 mcg/actuation nasal spray Use 2 spray(s) in each nostril once daily 16 g 5    hydrALAZINE (APRESOLINE) 50 mg tablet Take 50 mg by mouth three (3) times daily.  ezetimibe (ZETIA) 10 mg tablet Take 1 tablet by mouth once daily (Patient taking differently: Take 10 mg by mouth nightly.) 90 Tablet 3    atorvastatin (LIPITOR) 40 mg tablet Take 1 tablet by mouth once daily (Patient taking differently: Take 40 mg by mouth nightly.) 90 Tablet 3    glucose blood VI test strips (blood glucose test) strip Check glucose twice daily. DX N46.15T8 100 Strip 5    lancets misc USE TO CHECK BLOOD SUGAR TWICE DAILY 100 Each 5    losartan (COZAAR) 100 mg tablet Take 1 tablet by mouth once daily (Patient not taking: Reported on 6/14/2022) 90 Tablet 3    TRUEplus Lancets 33 gauge misc USE TO CHECK BLOOD SUGAR TWICE DAILY (Patient not taking: Reported on 6/14/2022)      Blood-Glucose Meter (True Metrix Glucose Meter) misc Check glucose twice daily. DX K24.79O3 (Patient not taking: Reported on 6/14/2022) 50 Each 5    Blood-Glucose Meter monitoring kit Use to check blood sugar 2 times per day.  (Patient not taking: Reported on 6/14/2022) 1 Kit 0       Allergies   Allergen Reactions    Amlodipine Swelling     Had swelling in his legs and feet - states was painful to walk       Review of Symptoms:       General - No history or complaints of unexpected fever or chills  Head/Neck - No history or complaints of headache or dizziness  Cardiac - No history or complaints of chest pain, palpitations, or shortness of breath  Pulmonary - No history or complaints of shortness of breath or productive cough  Gastrointestinal - as noted above  Genitourinary - No history or complaints of hematuria/dysuria or renal lithiasis  Musculoskeletal - No history or complaints of joint  muscular weakness  Hematologic - No history of any bleeding episodes  Neurologic - No history or complaints of  migraine headaches or neurologic symptoms        Objective:     Visit Vitals  /69   Pulse 76   Temp 97.1 °F (36.2 °C)   Ht 5' 7\" (1.702 m)   Wt 116.5 kg (256 lb 14.4 oz)   SpO2 100%   BMI 40.24 kg/m²       Physical Examination:     General appearance:  alert, cooperative, no distress, appears stated age   Mental status   alert, oriented to person, place, and time   Neck  supple, no significant adenopathy     Lymphatics  no palpable lymphadenopathy, no hepatosplenomegaly   Chest  clear to auscultation, no wheezes, rales or rhonchi, symmetric air entry   Heart  normal rate, regular rhythm, normal S1, S2, no murmurs, rubs, clicks or gallops    Abdomen: soft, nontender, nondistended, no masses or organomegaly   Incision:  Well healed, no hernias      Neurological  alert, oriented, normal speech, no focal findings or movement disorder noted   Musculoskeletal no joint tenderness, deformity or swelling   Extremities peripheral pulses normal, no pedal edema, no clubbing or cyanosis   Skin normal coloration and turgor, no rashes, no suspicious skin lesions noted        Assessment and Plan:   1. Intestinal malabsorption  a. continue required Vitamins: B12, B complex, D, iron, calcium, multivitamin  2. S/p laparoscopic bariatric surgery,laparoscopic sleeve gastrectomy , history of morbid obesity. Reviewed weight loss progress and is doing well. Do recommend using food tracking ana to ensure adequate protein and caloric intake. Aim for  g protein daily and increasing calories given his amount of exercise. a. Sleep goal is 7-9 hours each night. Patient education given on the effects of sleep deprivation on weight control. b. Discussed patients weight loss goals and dietary choices in relation to goals. c. Reminded to measure portions, continue high protein, low carbohydrate diet. Reminded to eat regularly, to eat slowly & not to drink with meals. d. Continue cardio exercise and add resistance exercises.  60-90 minutes of aerobic activity 5 days a week and strength training 2 days each week. e. Encouraged to attend support group   f. Required fluid intake is >64oz daily of decaffeinated sugar free beverages. Patient to complete labs before next visit. Lab slip given today. I have discussed this plan with patient and they verbalized understanding    30 minutes spent with patient. Follow up in 3 months or sooner if patient has questions, concerns or worsening of condition, if unable to reach our office, patient should report to the ED. Mr. Lilliam Reynolds has a reminder for a \"due or due soon\" health maintenance. I have asked that he contact his primary care provider for a follow-up on this health maintenance.

## 2022-06-30 ENCOUNTER — OFFICE VISIT (OUTPATIENT)
Dept: INTERNAL MEDICINE CLINIC | Age: 37
End: 2022-06-30
Payer: MEDICAID

## 2022-06-30 VITALS
BODY MASS INDEX: 40.49 KG/M2 | WEIGHT: 258 LBS | TEMPERATURE: 98.2 F | HEART RATE: 71 BPM | DIASTOLIC BLOOD PRESSURE: 74 MMHG | RESPIRATION RATE: 18 BRPM | HEIGHT: 67 IN | SYSTOLIC BLOOD PRESSURE: 137 MMHG | OXYGEN SATURATION: 96 %

## 2022-06-30 DIAGNOSIS — E11.22 CONTROLLED TYPE 2 DIABETES MELLITUS WITH STAGE 3 CHRONIC KIDNEY DISEASE, WITHOUT LONG-TERM CURRENT USE OF INSULIN (HCC): Primary | ICD-10-CM

## 2022-06-30 DIAGNOSIS — N18.30 CONTROLLED TYPE 2 DIABETES MELLITUS WITH STAGE 3 CHRONIC KIDNEY DISEASE, WITHOUT LONG-TERM CURRENT USE OF INSULIN (HCC): Primary | ICD-10-CM

## 2022-06-30 DIAGNOSIS — I10 ESSENTIAL HYPERTENSION: ICD-10-CM

## 2022-06-30 DIAGNOSIS — N18.32 STAGE 3B CHRONIC KIDNEY DISEASE (HCC): ICD-10-CM

## 2022-06-30 PROCEDURE — 3044F HG A1C LEVEL LT 7.0%: CPT | Performed by: INTERNAL MEDICINE

## 2022-06-30 PROCEDURE — 99213 OFFICE O/P EST LOW 20 MIN: CPT | Performed by: INTERNAL MEDICINE

## 2022-06-30 RX ORDER — SODIUM BICARBONATE 650 MG/1
TABLET ORAL
COMMUNITY
Start: 2022-06-16

## 2022-06-30 NOTE — PROGRESS NOTES
INTERNISTS [facility]:  6/30/2022, MRN: [id number]      Travis Osler is a 39 y.o. male and presents to clinic for Follow-up and Labs (6-8-22)      Subjective: The patient is a 21-year-old male with history of hypertension, type 2 diabetes, lumbar facet arthropathy, obesity, left transmetatarsal amputation secondary to a diabetic foot infection, and HLD. 1.  Type 2 diabetes and CKD: He is followed by his nephrologist.  Last appointment: 2 wks ago. At his last appointment, he reported a callus along his left foot. Since then, he met with his podiatrist. His callus is healing well. His A1c was 5.4 earlier this year. It is now 5.1. His creatinine is also improving. He was placed on sodium bicarb tablets by his nephrologist. He does not eat a lot of carbs and pork. Results for Rolando Hawley (MRN [id number]) as of 6/30/2022 09:52   Ref. Range 3/15/2022 10:00 6/8/2022 10:52   BUN Latest Ref Range: 6 - 20 mg/dL 50 (H) 32 (H)   Creatinine Latest Ref Range: 0.76 - 1.27 mg/dL 2.33 (H) 1.78 (H)      2. Hypertension: Blood pressure is 137/74 today on Coreg and hydralazine. He is to take losartan. Since his weight loss surgery, he has been losing weight. His weight today is 258 pounds. He is followed by [facility].         Patient Active Problem List    Diagnosis Date Noted    Intestinal malabsorption 03/22/2022    S/P laparoscopic sleeve gastrectomy 03/22/2022    Sleep apnea     Hx of diabetic foot ulcer     Acid reflux     Stage 3b chronic kidney disease ([facility] 09/12/2021    Erectile dysfunction 01/05/2020    Snoring 01/05/2020    Anemia 01/05/2020    Hyperlipidemia LDL goal <70 12/14/2018    Cataract of both eyes 10/08/2018    Hypertensive retinopathy of both eyes 10/08/2018    Essential hypertension 09/06/2018    Controlled type 2 diabetes mellitus, without long-term current use of insulin ([address] 06/19/2018    Proliferative diabetic retinopathy of both eyes ([address] 06/19/2018    Obesity, morbid, BMI 40.0-49.9 ([facility] [address] 05/20/2018    Lumbar facet arthropathy 05/20/2018       Current Outpatient Medications   Medication Sig Dispense Refill    sodium bicarbonate 650 mg tablet       cyanocobalamin (VITAMIN B-12) 1,000 mcg sublingual tablet Take 1,000 mcg by mouth daily.  b complex vitamins tablet Take 1 Tablet by mouth daily.  cholecalciferol (Vitamin D3) 25 mcg (1,000 unit) cap Take  by mouth daily.  calcium carbonate (OS-RYAN) 500 mg calcium (1,250 mg) tablet Take  by mouth daily.  carvediloL (COREG) 25 mg tablet TAKE 1 TABLET BY MOUTH TWICE DAILY WITH MEALS 60 Tablet 5    multivitamin (ONE A DAY) tablet Take 1 Tablet by mouth daily.  B.animalis,bifid,infantis,long (Probiotic 4X) 10-15 mg TbEC Take  by mouth.  fluticasone propionate (FLONASE) 50 mcg/actuation nasal spray Use 2 spray(s) in each nostril once daily 16 g 5    hydrALAZINE (APRESOLINE) 50 mg tablet Take 50 mg by mouth three (3) times daily.  ezetimibe (ZETIA) 10 mg tablet Take 1 tablet by mouth once daily (Patient taking differently: Take 10 mg by mouth nightly.) 90 Tablet 3    atorvastatin (LIPITOR) 40 mg tablet Take 1 tablet by mouth once daily (Patient taking differently: Take 40 mg by mouth nightly.) 90 Tablet 3    glucose blood VI test strips (blood glucose test) strip Check glucose twice daily. DX G91.95Y7 100 Strip 5    lancets misc USE TO CHECK BLOOD SUGAR TWICE DAILY 100 Each 5    TRUEplus Lancets 33 gauge misc USE TO CHECK BLOOD SUGAR TWICE DAILY (Patient not taking: Reported on 6/14/2022)      Blood-Glucose Meter (True Metrix Glucose Meter) misc Check glucose twice daily. DX J47.65X6 (Patient not taking: Reported on 6/14/2022) 50 Each 5    Blood-Glucose Meter monitoring kit Use to check blood sugar 2 times per day.  (Patient not taking: Reported on 6/14/2022) 1 Kit 0       Allergies   Allergen Reactions    Amlodipine Swelling     Had swelling in his legs and feet - states was painful to walk       Past Medical History:   Diagnosis Date    Acid reflux     avoids food triggers    [facility] 2021    Anemia 2020    Cataract of both eyes 10/8/2018    Chronic kidney disease     stage 3    Diabetes mellitus ([facility]     Dx'd 2018, HgA1c 9.4% 3/2021    Hx of diabetic foot ulcer     2021    Hyperlipidemia LDL goal <70 2018    Hypertension     Dx'd 2018    Morbid obesity ([facility]     Morbid obesity with BMI of 45.0-49.9, adult ([facility]     Proliferative diabetic retinopathy of both eyes ([facility] 2018    Sleep apnea     New diagnosis, waiting on CPAP    Stage 3b chronic kidney disease ([facility] 2021    Dr Renee Claude, nephrology       Past Surgical History:   Procedure Laterality Date    HX AMPUTATION TOE  2018    HX ORTHOPAEDIC Left     foot surgery x 3    HX ORTHOPAEDIC Left     amputation of toes x 5    HX ORTHOPAEDIC Left     infection foot, debridement    AR LAP, LUCIAN RESTRICT PROC, LONGITUDINAL GASTRECTOMY  03/10/2022    Dr Mirza Serve       Family History   Problem Relation Age of Onset    Kidney Disease Mother         ESRD - HD       Social History     Tobacco Use    Smoking status: Former Smoker     Quit date:      Years since quittin.5    Smokeless tobacco: Never Used   Substance Use Topics    Alcohol use: No       ROS   Review of Systems   Constitutional: Negative for chills and fever. HENT: Negative for ear pain and sore throat. Eyes: Negative for blurred vision and pain. Respiratory: Negative for cough and shortness of breath. Cardiovascular: Negative for chest pain. Gastrointestinal: Negative for abdominal pain, blood in stool and melena. Genitourinary: Negative for dysuria and hematuria. Musculoskeletal: Negative for joint pain and myalgias. Skin: Negative for rash. Neurological: Negative for headaches. Endo/Heme/Allergies: Does not bruise/bleed easily. Psychiatric/Behavioral: Negative for substance abuse. Objective     Vitals:    06/30/22 0935 06/30/22 0945   BP: (!) 147/81 137/74   Pulse: 71    Resp: 18    Temp: 98.2 °F (36.8 °C)    TempSrc: Temporal    SpO2: 96%    Weight: 258 lb (117 kg)    Height: 5' 7\" (1.702 m)    PainSc:   2    PainLoc: Generalized        Physical Exam  Vitals and nursing note reviewed. Constitutional:       Appearance: Normal appearance. HENT:      Head: Normocephalic and atraumatic. Right Ear: External ear normal.      Left Ear: External ear normal.   Eyes:      Extraocular Movements: Extraocular movements intact. Conjunctiva/sclera: Conjunctivae normal.   Cardiovascular:      Rate and Rhythm: Normal rate and regular rhythm. Pulses: Normal pulses. Heart sounds: Normal heart sounds. Pulmonary:      Effort: Pulmonary effort is normal.      Breath sounds: Normal breath sounds. Abdominal:      General: Abdomen is flat. Bowel sounds are normal. There is no distension. Palpations: Abdomen is soft. Tenderness: There is no abdominal tenderness. Musculoskeletal:         General: No swelling (BUE) or tenderness (BUE). Cervical back: Neck supple. Lymphadenopathy:      Cervical: No cervical adenopathy. Skin:     General: Skin is warm and dry. Findings: No erythema. Neurological:      Mental Status: He is alert. Mental status is at baseline.       Gait: Gait normal.   Psychiatric:         Mood and Affect: Mood normal.         LABS   Data Review:   Lab Results   Component Value Date/Time    WBC 6.1 06/08/2022 10:52 AM    HGB 11.3 (L) 06/08/2022 10:52 AM    HCT 36.0 (L) 06/08/2022 10:52 AM    PLATELET 108 08/95/2304 10:52 AM    MCV 86 06/08/2022 10:52 AM       Lab Results   Component Value Date/Time    Sodium 141 06/08/2022 10:52 AM    Potassium 5.2 06/08/2022 10:52 AM    Chloride 107 (H) 06/08/2022 10:52 AM    CO2 18 (L) 06/08/2022 10:52 AM    Anion gap 7 03/11/2022 08:30 AM    Glucose 91 06/08/2022 10:52 AM    BUN 32 (H) 06/08/2022 10:52 AM Creatinine 1.78 (H) 06/08/2022 10:52 AM    BUN/Creatinine ratio 18 06/08/2022 10:52 AM    GFR est AA 36 (L) 03/11/2022 08:30 AM    GFR est non-AA 29 (L) 03/11/2022 08:30 AM    Calcium 9.3 06/08/2022 10:52 AM       Lab Results   Component Value Date/Time    Cholesterol, total 145 03/07/2022 09:58 AM    HDL Cholesterol 38 (L) 03/07/2022 09:58 AM    LDL, calculated 80 03/07/2022 09:58 AM    LDL, calculated 156 (H) 07/24/2020 09:46 AM    VLDL, calculated 27 03/07/2022 09:58 AM    VLDL, calculated 69 (H) 07/24/2020 09:46 AM    Triglyceride 157 (H) 03/07/2022 09:58 AM       Lab Results   Component Value Date/Time    Hemoglobin A1c 5.1 06/08/2022 10:52 AM       Assessment/Plan:   1. Stage 3b chronic kidney disease: Improving.  -Checking a follow-up renal panel.  - I encouraged him to follow-up with his nephrologist.    Brian Wilson:  - RENAL FUNCTION PANEL; Future    2. Controlled type 2 diabetes mellitus with stage 3 chronic kidney disease: Improved. Diet controlled. -I encouraged him to continue with a diabetic renal diet. - We will check follow-up labs later this year. ORDERS:  - RENAL FUNCTION PANEL; Future  - HEMOGLOBIN A1C WITH EAG; Future    3. Hypertension: Stable. -Continue with medication as prescribed. - Return to clinic for BP check. Health Maintenance Due   Topic Date Due    Hepatitis C Screening  Never done    Pneumococcal 0-64 years (2 - PCV) 09/06/2019    Foot Exam Q1  01/19/2022     Lab review: labs are reviewed in the EHR and ordered as mentioned above    I have discussed the diagnosis with the patient and the intended plan as seen in the above orders. The patient has received an after-visit summary and questions were answered concerning future plans. I have discussed medication side effects and warnings with the patient as well. I have reviewed the plan of care with the patient, accepted their input and they are in agreement with the treatment goals. All questions were answered.  The patient understands the plan of care. Handouts provided today with above information. Pt instructed if symptoms worsen to call the office or report to the ED for continued care. Greater than 50% of the visit time was spent in counseling and/or coordination of care. Voice recognition was used to generate this report, which may have resulted in some phonetic based errors in grammar and contents. Even though attempts were made to correct all the mistakes, some may have been missed, and remained in the body of the document.           Ashley Márquez MD

## 2022-06-30 NOTE — PATIENT INSTRUCTIONS
Learning About Tests When You Have Diabetes  Why do you need regular tests? Diabetes can lead to other health problems if it's not well controlled. You'll need tests to monitor how well your diabetes is controlled and to check for other things like high cholesterol or kidney problems. Having tests on a regular schedule can help your doctor find problems early, when it's easier to manage them. What tests do you need? These are the tests you may need and how often you should have them. A1c blood test.  This test shows the average level of blood sugar over the past 2 to 3 months. It helps your doctor see whether blood sugar levels have been staying within your target range. · How often: Every 3 to 6 months  · Goal: A blood sugar level in your target range  Blood pressure test.  This test measures the pressure of blood flow in the arteries. Controlling blood pressure can help prevent damage to nerves and blood vessels. · How often: Every 3 to 6 months  · Goal: A blood pressure level in your target range  Cholesterol test.  This test measures the amount of a type of fat in the blood. It is common for people with diabetes to also have high cholesterol. Too much cholesterol in the blood can build up inside the blood vessels and raise the risk for heart attack and stroke. · How often: At the time of your diabetes diagnosis, and as often as your doctor recommends after that  · Goal: A cholesterol level in your target range  Albumin-creatinine ratio test.  This test checks for kidney damage by looking for the protein albumin (say \"al-BYOO-roscoe\") in the urine. Albumin is normally found in the blood. Kidney damage can let small amounts of it (microalbumin) leak into the urine. · How often: Once a year  · Goal: No protein in the urine  Blood creatinine test/estimated glomerular filtration (eGFR). The blood creatinine (say \"pfjb-ZL-nw-neen\") level shows how well your kidneys are working.  Creatinine is a waste product that muscles release into the blood. Blood creatinine is used to estimate the glomerular filtration rate. A high level of creatinine and/or a low eGFR may mean your kidneys are not working as well as they should. · How often: Once a year  · Goal: Normal level of creatinine in the blood. The eGFR goal is greater than 60 mL/min/1.73 m². Complete foot exam.  The doctor checks for foot sores and whether any sensation has been lost.  · How often: Once a year  · Goal: Healthy feet with no foot ulcers or loss of feeling  Dental exam and cleaning. The dentist checks for gum disease and tooth decay. People with high blood sugar are more likely to have these problems. · How often: Every 6 months  · Goal: Healthy teeth and gums  Complete eye exam.  High blood sugar levels can damage the eyes. This exam is done by an ophthalmologist or optometrist. It includes a dilated eye exam. The exam shows whether there's damage to the back of the eye (diabetic retinopathy). · How often: Once a year. If you don't have any signs of diabetic retinopathy, your doctor may recommend an exam every 2 years. · Goal: No damage to the back of the eye  Thyroid-stimulating hormone (TSH) blood test.  This test checks for thyroid disease. Too little thyroid hormone can cause some medicines (like insulin) to stay in the body longer. This can cause low blood sugar. You may be tested if you have high cholesterol or are a woman over 48years old. · How often: As part of your diabetes diagnosis, and as often as your doctor recommends after that  · Goal: Normal level of TSH in the blood  Follow-up care is a key part of your treatment and safety. Be sure to make and go to all appointments, and call your doctor if you are having problems. It's also a good idea to know your test results and keep a list of the medicines you take. Where can you learn more?   Go to http://www.[employer].com/  Enter A243 in the search box to learn more about \"Learning About Tests When You Have Diabetes. \"  Current as of: July 28, 2021               Content Version: 13.2  © 9564-0013 [employer]. Care instructions adapted under license by [employer] (which disclaims liability or warranty for this information). If you have questions about a medical condition or this instruction, always ask your healthcare professional. [place] [address] any warranty or liability for your use of this information.

## 2022-06-30 NOTE — PROGRESS NOTES
Bhumika Clemens presents today for   Chief Complaint   Patient presents with    Follow-up    Labs     6-8-22             1. \"Have you been to the ER, urgent care clinic since your last visit? Hospitalized since your last visit? \" no    2. \"Have you seen or consulted any other health care providers outside of the [address] [place] [address] since your last visit? \" yes     3. For patients aged 39-70: Has the patient had a colonoscopy / FIT/ Cologuard? NA - based on age      If the patient is female:    4. For patients aged 41-77: Has the patient had a mammogram within the past 2 years? NA - based on age or sex  See top three    5. For patients aged 21-65: Has the patient had a pap smear?  NA - based on age or sex

## 2022-08-05 NOTE — PERIOP NOTES
Last office visit 06/10/22   SBAR ready for review, room assignment #[id number]. Report to Valery Bee RN for lunch relief.

## 2022-09-27 ENCOUNTER — TELEPHONE (OUTPATIENT)
Dept: INTERNAL MEDICINE CLINIC | Age: 37
End: 2022-09-27
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
## 2022-09-28 ENCOUNTER — OFFICE VISIT (OUTPATIENT)
Dept: INTERNAL MEDICINE CLINIC | Age: 37
End: 2022-09-28
Payer: MEDICAID

## 2022-09-28 ENCOUNTER — TELEPHONE (OUTPATIENT)
Dept: PHYSICAL THERAPY | Age: 37
End: 2022-09-28

## 2022-09-28 VITALS
HEIGHT: 67 IN | WEIGHT: 232.8 LBS | BODY MASS INDEX: 36.54 KG/M2 | SYSTOLIC BLOOD PRESSURE: 156 MMHG | OXYGEN SATURATION: 96 % | DIASTOLIC BLOOD PRESSURE: 83 MMHG | TEMPERATURE: 98.7 F | RESPIRATION RATE: 17 BRPM | HEART RATE: 73 BPM

## 2022-09-28 DIAGNOSIS — M54.50 ACUTE MIDLINE LOW BACK PAIN WITHOUT SCIATICA: ICD-10-CM

## 2022-09-28 DIAGNOSIS — M89.8X1 PAIN OF LEFT CLAVICLE: ICD-10-CM
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
## 2022-09-28 DIAGNOSIS — M47.816 LUMBAR FACET ARTHROPATHY: ICD-10-CM
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
## 2022-09-28 PROCEDURE — 99214 OFFICE O/P EST MOD 30 MIN: CPT | Performed by: INTERNAL MEDICINE

## 2022-09-28 RX ORDER — CYCLOBENZAPRINE HCL 10 MG
TABLET ORAL
COMMUNITY
Start: 2022-09-26 | End: 2022-09-28

## 2022-09-28 RX ORDER — TRAMADOL HYDROCHLORIDE 50 MG/1
50 TABLET ORAL
Qty: 21 TABLET | Refills: 0 | Status: SHIPPED | OUTPATIENT
Start: 2022-09-28 | End: 2022-10-05

## 2022-09-28 RX ORDER — LIDOCAINE 50 MG/G
PATCH TOPICAL
COMMUNITY
Start: 2022-09-26

## 2022-09-28 RX ORDER — TIZANIDINE 2 MG/1
2 TABLET ORAL
Qty: 45 TABLET | Refills: 3 | Status: SHIPPED | OUTPATIENT
Start: 2022-09-28
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
## 2022-09-30 DIAGNOSIS — E11.22 CONTROLLED TYPE 2 DIABETES MELLITUS WITH STAGE 3 CHRONIC KIDNEY DISEASE, WITHOUT LONG-TERM CURRENT USE OF INSULIN (HCC): ICD-10-CM

## 2022-09-30 DIAGNOSIS — N18.30 CONTROLLED TYPE 2 DIABETES MELLITUS WITH STAGE 3 CHRONIC KIDNEY DISEASE, WITHOUT LONG-TERM CURRENT USE OF INSULIN (HCC): ICD-10-CM

## 2022-09-30 DIAGNOSIS — N18.32 STAGE 3B CHRONIC KIDNEY DISEASE (HCC): ICD-10-CM

## 2022-10-06 ENCOUNTER — HOSPITAL ENCOUNTER (OUTPATIENT)
Dept: PHYSICAL THERAPY | Age: 37
Discharge: HOME | End: 2022-10-06
Attending: INTERNAL MEDICINE
Payer: MEDICAID

## 2022-10-06 DIAGNOSIS — M89.8X1 PAIN OF LEFT CLAVICLE: ICD-10-CM

## 2022-10-06 DIAGNOSIS — M54.50 ACUTE MIDLINE LOW BACK PAIN WITHOUT SCIATICA: ICD-10-CM
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
## 2022-10-06 DIAGNOSIS — M47.816 LUMBAR FACET ARTHROPATHY: ICD-10-CM

## 2022-10-06 PROCEDURE — 97161 PT EVAL LOW COMPLEX 20 MIN: CPT
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
## 2022-10-14 ENCOUNTER — HOSPITAL ENCOUNTER (OUTPATIENT)
Dept: LAB | Age: 37
Discharge: HOME | End: 2022-10-14

## 2022-10-14 LAB — XX-LABCORP SPECIMEN COL,LCBCF: NORMAL

## 2022-10-14 PROCEDURE — 99001 SPECIMEN HANDLING PT-LAB: CPT

## 2022-10-20 ENCOUNTER — APPOINTMENT (OUTPATIENT)
Dept: INTERNAL MEDICINE CLINIC | Age: 37
End: 2022-10-20

## 2022-10-20 DIAGNOSIS — Z98.84 S/P LAPAROSCOPIC SLEEVE GASTRECTOMY: ICD-10-CM

## 2022-10-20 DIAGNOSIS — K90.9 INTESTINAL MALABSORPTION, UNSPECIFIED TYPE: ICD-10-CM

## 2022-10-21 LAB
ALBUMIN SERPL-MCNC: 3.5 G/DL (ref 4–5)
BUN SERPL-MCNC: 34 MG/DL (ref 6–20)
BUN/CREAT SERPL: 17 (ref 9–20)
CALCIUM SERPL-MCNC: 9 MG/DL (ref 8.7–10.2)
CHLORIDE SERPL-SCNC: 106 MMOL/L (ref 96–106)
CO2 SERPL-SCNC: 20 MMOL/L (ref 20–29)
CREAT SERPL-MCNC: 1.98 MG/DL (ref 0.76–1.27)
EGFR: 44 ML/MIN/1.73
EST. AVERAGE GLUCOSE BLD GHB EST-MCNC: 100 MG/DL
GLUCOSE SERPL-MCNC: 78 MG/DL (ref 70–99)
HBA1C MFR BLD: 5.1 % (ref 4.8–5.6)
INTERPRETATION: NORMAL
PHOSPHATE SERPL-MCNC: 4.4 MG/DL (ref 2.8–4.1)
POTASSIUM SERPL-SCNC: 5.2 MMOL/L (ref 3.5–5.2)
SODIUM SERPL-SCNC: 143 MMOL/L (ref 134–144)

## 2022-10-25 NOTE — PROGRESS NOTES
I will let him know that his creatinine is 1.98, up from 1.78 in June. His BUN is 34. His phosphorus is mildly elevated at 4.4. His A1c is unchanged at 5.9.     Dr. Dilma Alejandra  [facility] of [address], [facility] [address]  Phone: [phone]  Fax: [phone]

## 2022-11-03 ENCOUNTER — HOSPITAL ENCOUNTER (OUTPATIENT)
Dept: PHYSICAL THERAPY | Age: 37
Discharge: HOME | End: 2022-11-03
Attending: INTERNAL MEDICINE
Payer: MEDICAID

## 2022-11-03 PROCEDURE — 97530 THERAPEUTIC ACTIVITIES: CPT

## 2022-11-03 PROCEDURE — 97112 NEUROMUSCULAR REEDUCATION: CPT

## 2022-11-03 PROCEDURE — 97014 ELECTRIC STIMULATION THERAPY: CPT

## 2022-11-03 PROCEDURE — 97110 THERAPEUTIC EXERCISES: CPT

## 2022-11-03 NOTE — PROGRESS NOTES
PT DAILY TREATMENT NOTE     Patient Name: Judge Nixon  Date:11/3/2022  : 1985  [x]  Patient  Verified  Payor: [employer] MEDICAID / Plan: [facility] [employer] / Product Type: Managed Care Medicaid /    In time:1:31  Out time:2:25  Total Treatment Time (min): 54  Visit #: 2 of -8    Medicare/BCBS Only   Total Timed Codes (min):  44 1:1 Treatment Time:  44       Treatment Area: Other low back pain [M54.59]    SUBJECTIVE  Pain Level (0-10 scale): 6/10  Any medication changes, allergies to medications, adverse drug reactions, diagnosis change, or new procedure performed?: [x] No    [] Yes (see summary sheet for update)  Subjective functional status/changes:   [] No changes reported  The patient reports that he has remained compliant with HEP. He does continue to have pain in the base of the low back, though reports less pain upon arrival today. OBJECTIVE  Modality rationale: decrease pain and increase tissue extensibility to improve the patients ability to improve ADL ease. Min Type Additional Details   10 [x] Estim:  [x]Unatt       [x]IFC  []Premod                        []Other:  []w/ice   [x]w/heat  Position: seated  Location: lumbar spine   [] Skin assessment post-treatment:  []intact []redness- no adverse reaction    []redness - adverse reaction:     19 min Therapeutic Exercise:  [x] See flow sheet :   Rationale: increase ROM and increase strength to improve the patients ability to improve ADL ease. 10 min Therapeutic Activity:  [x]  See flow sheet : pull downs for improved chore production, sit to stands for improved ease of transfers. Rationale: increase ROM, increase strength, and improve coordination  to improve the patients ability to improve ADL ease.       15 min Neuromuscular Re-education:  []  See flow sheet : core align, pilates barrel hip extension for glute activation   Rationale: increase ROM, increase strength, and improve coordination  to improve the patients ability to improve ADL ease. With   [] TE   [] TA   [] neuro   [] other: Patient Education: [x] Review HEP    [] Progressed/Changed HEP based on:   [] positioning   [] body mechanics   [] transfers   [] heat/ice application    [] other:      Other Objective/Functional Measures: FOTO: 52  Glute max strength: NT MMT right; NT MMT left  % improvement: No change to date   Lumbar flexion forward stooping back pain report: + pain    Pain Level (0-10 scale) post treatment: 4/10    ASSESSMENT/Changes in Function: The patient has had limited follow-up to date due to difficulty with insurance authorization, thus today is his first follow-up since evaluation, limiting progress to date. The patient does report compliance with HEP, though no % improvement reported. His strength has not been tested today through glutes, and his lumbar flexion (trunk flexion in standing) is noted to still increase pain through the lower midline of his lumbar spine. The patient's FOTO score is 47, indicating no significant change due to lack of follow-up. The patient will continue to benefit from skilled PT in order to progress mobility, reduce pain and improve mobility. Patient will continue to benefit from skilled PT services to modify and progress therapeutic interventions, address functional mobility deficits, address ROM deficits, address strength deficits, analyze and address soft tissue restrictions, analyze and cue movement patterns, analyze and modify body mechanics/ergonomics, assess and modify postural abnormalities, and instruct in home and community integration to attain remaining goals. []  See Plan of Care  []  See progress note/recertification  []  See Discharge Summary         Progress towards goals / Updated goals:  Short Term Goals:  To be accomplished in 1 weeks:              1. The pt will be I and compliant with HEP              IE- issued HEP    Current: Met - reports compliance 11/03/2022  Long Term Goals: To be accomplished in 4 weeks:              1. Improve FOTO score to 66 to improve ability for daily activities              IE- 49   Current: Slight decrease to 47 11/03/2022              2. The pt will demonstrate 4+/5 B glute max strength without pain increase for improved ability for functional tasks              IE- 4-/5 B with pain   Current; Not tested 11/03/2022              3. The pt will report at least 75% improvement for improved ability for functional tasks              IE- 0   Current: No change in percentage to date 11/03/2022              4. The pt will demonstrate no difficulty with bending or stooping.               IE- quite a bit of difficulty              5. The pt will report no pain increase with lumbar flexion for improved ability for ADls              IE- +pain with flexion    Current: No change, + pain with flexion 11/03/2022    PLAN  [x]  Upgrade activities as tolerated     []  Continue plan of care  []  Update interventions per flow sheet       []  Discharge due to:_  []  Other:_      Mayela Boudreaux, PT 11/3/2022  1:37 PM    Future Appointments   Date Time Provider Ana Galan   12/7/2022  8:40 AM Bran Reed MD [facility] BS AMB
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
## 2022-11-17 ENCOUNTER — APPOINTMENT (OUTPATIENT)
Dept: PHYSICAL THERAPY | Age: 37
End: 2022-11-17
Attending: INTERNAL MEDICINE
Payer: MEDICAID

## 2022-11-18 ENCOUNTER — HOSPITAL ENCOUNTER (OUTPATIENT)
Dept: PHYSICAL THERAPY | Age: 37
Discharge: HOME | End: 2022-11-18
Attending: INTERNAL MEDICINE
Payer: MEDICAID

## 2022-11-18 PROCEDURE — 97112 NEUROMUSCULAR REEDUCATION: CPT

## 2022-11-18 PROCEDURE — 97530 THERAPEUTIC ACTIVITIES: CPT

## 2022-11-18 PROCEDURE — 97110 THERAPEUTIC EXERCISES: CPT

## 2022-11-18 NOTE — PROGRESS NOTES
PT DAILY TREATMENT NOTE     Patient Name: Cedric Zarco  Date:2022  : 1985  [x]  Patient  Verified  Payor: [facility] MEDICAID / Plan: [facility] BELCH[facility] CCCP / Product Type: Managed Care Medicaid /    In time:215  Out time:311  Total Treatment Time (min): 56  Visit #: 3 of -8    Medicare/BCBS Only   Total Timed Codes (min):  46 1:1 Treatment Time:  46       Treatment Area: Other low back pain [M54.59]    SUBJECTIVE  Pain Level (0-10 scale): 6  Any medication changes, allergies to medications, adverse drug reactions, diagnosis change, or new procedure performed?: [x] No    [] Yes (see summary sheet for update)  Subjective functional status/changes:   [] No changes reported  Patient reports that he felt \"fine\" after his last session but that he experienced muscular soreness lasting about an hour afterward. He has noticed an improvement when he bends forward now. OBJECTIVE    Modality rationale: decrease pain and increase tissue extensibility to improve the patients ability to tolerate ADLs.     Min Type Additional Details   10 [x] Estim:  [x]Unatt       [x]IFC  []Premod                        []Other:  []w/ice   [x]w/heat  Position: seated  Location:lumbar spine    [] Estim: []Att    []TENS instruct  []NMES                    []Other:  []w/US   []w/ice   []w/heat  Position:  Location:    []  Traction: [] Cervical       []Lumbar                       [] Prone          []Supine                       []Intermittent   []Continuous Lbs:  [] before manual  [] after manual    []  Ultrasound: []Continuous   [] Pulsed                           []1MHz   []3MHz W/cm2:  Location:    []  Iontophoresis with dexamethasone         Location: [] Take home patch   [] In clinic    []  Ice     []  heat  []  Ice massage  []  Laser   []  Anodyne Position:  Location:    []  Laser with stim  []  Other:  Position:  Location:    []  Vasopneumatic Device    []  Right     []  Left  Pre-treatment girth:  Post-treatment girth: Measured at (location):  Pressure:       [] lo [] med [] hi   Temperature: [] lo [] med [] hi   [x] Skin assessment post-treatment:  [x]intact [x]redness- no adverse reaction    []redness - adverse reaction:         15 min Therapeutic Exercise:  [x] See flow sheet :   Rationale: increase ROM and increase strength to improve the patients ability to complete functional activities with ease. 15 min Therapeutic Activity:  [x]  See flow sheet : functional reaching and marching   Rationale: increase ROM and increase strength  to improve the patients ability to complete functional activities with ease. 16 min Neuromuscular Re-education:  [x]  See flow sheet : hip and glute re-education, including VC/TC to decrease hip flexor compensation   Rationale: increase ROM, increase strength, improve coordination, improve balance, and increase proprioception  to improve the patients ability to improve biomechanics and increase kinesthetic awareness for ease of ADL completion. With   [] TE   [] TA   [] neuro   [] other: Patient Education: [x] Review HEP    [] Progressed/Changed HEP based on:   [] positioning   [] body mechanics   [] transfers   [] heat/ice application    [] other:      Other Objective/Functional Measures:   -increased reps during core align abd and ext   -initiated trap bar exercises  -initiated clams  -initiated vipul stretch    Pain Level (0-10 scale) post treatment: 3-4    ASSESSMENT/Changes in Function: Today's session focused on increased hip and glute strength, mobility, and kinesthetic awareness. Patient tolerated treatment well, reporting no adverse responses throughout activities, despite progressions in repetitions and initiation of new exercises. Continue to progress POC.      Patient will continue to benefit from skilled PT services to modify and progress therapeutic interventions, address functional mobility deficits, address ROM deficits, address strength deficits, analyze and address soft tissue restrictions, analyze and cue movement patterns, analyze and modify body mechanics/ergonomics, assess and modify postural abnormalities, address imbalance/dizziness, and instruct in home and community integration to attain remaining goals. []  See Plan of Care  []  See progress note/recertification  []  See Discharge Summary         Progress towards goals / Updated goals:    1. Improve FOTO score to 66 to improve ability for daily activities              IE- 49              Current: Slight decrease to 47 11/03/2022                2. The pt will demonstrate 4+/5 B glute max strength without pain increase for improved ability for functional tasks              IE- 4-/5 B with pain; Not tested 11/03/2022                3. The pt will report at least 75% improvement for improved ability for functional tasks              PN No change in percentage to date   Current: progressing patient reports 65% improvement 11/18/22              4. The pt will demonstrate no difficulty with bending or stooping.               PN: remains quite a bit of difficulty                 5. The pt will report no pain increase with lumbar flexion for improved ability for ADls              PN: No change, + pain with flexion     PLAN  [x]  Upgrade activities as tolerated     []  Continue plan of care  []  Update interventions per flow sheet       []  Discharge due to:_  []  Other:_      Quang Shepherd, PTA 11/18/2022  8:42 AM    Future Appointments   Date Time Provider Ana Galan   11/18/2022  2:15 PM [facility]   11/21/2022  3:45 PM Adriana Benavides, PTA [facility]   11/23/2022  2:45 PM Chandra Every, PTA [facility]   11/28/2022  1:15 PM Chandra Every, PTA [facility]   11/30/2022  1:45 PM Geeta Chaves, PT [facility]   12/5/2022  3:00 PM Geeta Chaves, PT [facility]   12/7/2022  8:40 AM Radha Worthington MD [facility] BS AMB

## 2022-11-21 ENCOUNTER — HOSPITAL ENCOUNTER (OUTPATIENT)
Dept: PHYSICAL THERAPY | Age: 37
Discharge: HOME | End: 2022-11-21
Attending: INTERNAL MEDICINE
Payer: MEDICAID

## 2022-11-21 PROCEDURE — 97530 THERAPEUTIC ACTIVITIES: CPT

## 2022-11-21 PROCEDURE — 97112 NEUROMUSCULAR REEDUCATION: CPT

## 2022-11-21 PROCEDURE — 97110 THERAPEUTIC EXERCISES: CPT

## 2022-11-21 NOTE — PROGRESS NOTES
PT DAILY TREATMENT NOTE     Patient Name: Evelyn Certain  Date:2022  : 1985  [x]  Patient  Verified  Payor: [facility] MEDICAID / Plan: [facility] CCCP / Product Type: Managed Care Medicaid /    In time:345  Out time:430  Total Treatment Time (min): 45  Visit #: 4 of -    Treatment Area: Other low back pain [M54.59]    SUBJECTIVE  Pain Level (0-10 scale): 5/10  Any medication changes, allergies to medications, adverse drug reactions, diagnosis change, or new procedure performed?: [x] No    [] Yes (see summary sheet for update)  Subjective functional status/changes:   [] No changes reported  Pt reports pain in the middle of his back but reports no pain going down legs    OBJECTIVE    20 min Therapeutic Exercise:  [x] See flow sheet :   Rationale: increase ROM and increase strength to improve the patients ability to per    10 min Therapeutic Activity:  [x]  See flow sheet : functional standing activities, functional marching, pt education   Rationale: increase ROM and increase strength  to improve the patients ability to perform ADL's with ease     15 min Neuromuscular Re-education:  [x]  See flow sheet : core re-ed, hip abductor, glut, hs re-ed   Rationale: increase ROM and increase strength  to improve the patients ability to perform ADL's safely          With   [] TE   [] TA   [] neuro   [] other: Patient Education: [x] Review HEP    [] Progressed/Changed HEP based on:   [] positioning   [] body mechanics   [] transfers   [] heat/ice application    [] other:      Other Objective/Functional Measures: Added lateral/monster walks  Increased reps for trap bar  Added [place] row/ext/[place]  Increased reps for DKTC     Pain Level (0-10 scale) post treatment: 2/10    ASSESSMENT/Changes in Function: Pt making steady progress towards goals. Pt challenged with today's treatment session but able to complete without increased pain or discomfort.  Pt required verbal cues for proper form and mechanics with therex. Glut max strength is increasing. Will continue to progress as tolerated to address remaining deficits. Patient will continue to benefit from skilled PT services to modify and progress therapeutic interventions, address functional mobility deficits, address ROM deficits, address strength deficits, and analyze and address soft tissue restrictions to attain remaining goals. []  See Plan of Care  []  See progress note/recertification  []  See Discharge Summary         Progress towards goals / Updated goals:    1. Improve FOTO score to 66 to improve ability for daily activities              IE- 49              Current: Slight decrease to 47 11/03/2022                 2. The pt will demonstrate 4+/5 B glute max strength without pain increase for improved ability for functional tasks              IE- 4-/5 B with pain; Not tested 11/03/2022   Current: Progressing, 4/5 11/21/22                 3. The pt will report at least 75% improvement for improved ability for functional tasks              PN No change in percentage to date   Current: progressing patient reports 65% improvement 11/18/22              4. The pt will demonstrate no difficulty with bending or stooping.               PN: remains quite a bit of difficulty                  5. The pt will report no pain increase with lumbar flexion for improved ability for ADls              PN: No change, + pain with flexion     PLAN  []  Upgrade activities as tolerated     [x]  Continue plan of care  []  Update interventions per flow sheet       []  Discharge due to:_  []  Other:_      Len Hernandez PTA 11/21/2022  3:21 PM    Future Appointments   Date Time Provider Ana Galan   11/21/2022  3:45 PM [facility]   11/23/2022  2:45 PM [facility]   11/28/2022  1:15 PM [facility]   11/30/2022  1:45 PM [facility] Haji, PT [facility]   12/5/2022  3:00 PM Arthur Haji PT [facility]   12/7/2022  8:40 AM Yamile Wade MD [facility] BS AMB

## 2022-11-23 ENCOUNTER — HOSPITAL ENCOUNTER (OUTPATIENT)
Dept: PHYSICAL THERAPY | Age: 37
Discharge: HOME | End: 2022-11-23
Attending: INTERNAL MEDICINE
Payer: MEDICAID

## 2022-11-23 PROCEDURE — 97112 NEUROMUSCULAR REEDUCATION: CPT

## 2022-11-23 PROCEDURE — 97110 THERAPEUTIC EXERCISES: CPT

## 2022-11-23 PROCEDURE — 97530 THERAPEUTIC ACTIVITIES: CPT

## 2022-11-23 NOTE — PROGRESS NOTES
PT DAILY TREATMENT NOTE     Patient Name: Regino Solis  Date:2022  : 1985  [x]  Patient  Verified  Payor: [facility] MEDICAID / Plan: PORT[facility] BELCHER [facility] / Product Type: Managed Care Medicaid /    In time:2:19  Out time:3:01  Total Treatment Time (min): 41  Visit #: 3 of 8    Medicare/BCBS Only   Total Timed Codes (min):  41 1:1 Treatment Time:  41       Treatment Area: Other low back pain [M54.59]    SUBJECTIVE  Pain Level (0-10 scale): 4/10  Any medication changes, allergies to medications, adverse drug reactions, diagnosis change, or new procedure performed?: [x] No    [] Yes (see summary sheet for update)  Subjective functional status/changes:   [] No changes reported  Pt reports no new complaints of pain. OBJECTIVE      21 min Therapeutic Exercise:  [x] See flow sheet :   Rationale: increase ROM and increase strength to improve the patients ability to perform ADLs with increased ease. 10 min Therapeutic Activity:  [x]  See flow sheet :   Rationale: increase strength, improve coordination, and increase proprioception  to improve the patients ability to perform ADLs with increased ease. 10 min Neuromuscular Re-education:  [x]  See flow sheet : core re-ed, hip abductor, glut, hs re-ed   Rationale: increase ROM and increase strength  to improve the patients ability to perform ADL's safely    With   [] TE   [] TA   [] neuro   [] other: Patient Education: [x] Review HEP    [] Progressed/Changed HEP based on:   [] positioning   [] body mechanics   [] transfers   [] heat/ice application    [] other:      Other Objective/Functional Measures:      Pain Level (0-10 scale) post treatment: 2/10    ASSESSMENT/Changes in Function: Pt demonstrates good form and tolerance to exercise program.  Pt continues to make good progress toward goals.      Patient will continue to benefit from skilled PT services to modify and progress therapeutic interventions, address functional mobility deficits, address ROM deficits, address strength deficits, analyze and address soft tissue restrictions, analyze and cue movement patterns, analyze and modify body mechanics/ergonomics, and assess and modify postural abnormalities to attain remaining goals. []  See Plan of Care  []  See progress note/recertification  []  See Discharge Summary         Progress towards goals / Updated goals:   1. Improve FOTO score to 66 to improve ability for daily activities              IE- 49              Current: Slight decrease to 47 11/03/2022                 2. The pt will demonstrate 4+/5 B glute max strength without pain increase for improved ability for functional tasks              IE- 4-/5 B with pain; Not tested 11/03/2022              Current: Progressing, 4/5 11/21/22                 3. The pt will report at least 75% improvement for improved ability for functional tasks              PN No change in percentage to date   Current: progressing patient reports 65% improvement 11/18/22              4. The pt will demonstrate no difficulty with bending or stooping. PN: remains quite a bit of difficulty                  5. The pt will report no pain increase with lumbar flexion for improved ability for ADls              PN: No change, + pain with flexion    Current: Pts pain rating continues to decrease.  11/23/2022    PLAN  []  Upgrade activities as tolerated     [x]  Continue plan of care  []  Update interventions per flow sheet       []  Discharge due to:_  []  Other:_      Abelino Alexandre PTA 11/23/2022  2:22 PM    Future Appointments   Date Time Provider Ana Galan   11/23/2022  2:45 PM Aarsh Foreman PTA [facility]   11/28/2022  1:15 PM [facility]man[facility]   11/30/2022  1:45 PM Nancy Lee, [facility]   12/5/2022  3:00 PM [facility] Lee[facility]   12/7/2022  8:40 AM Bran Reed MD [facility]

## 2022-11-28 ENCOUNTER — HOSPITAL ENCOUNTER (OUTPATIENT)
Dept: PHYSICAL THERAPY | Age: 37
End: 2022-11-28
Attending: INTERNAL MEDICINE
Payer: MEDICAID

## 2022-11-28 PROCEDURE — 97110 THERAPEUTIC EXERCISES: CPT

## 2022-11-28 PROCEDURE — 97530 THERAPEUTIC ACTIVITIES: CPT

## 2022-11-28 PROCEDURE — 97112 NEUROMUSCULAR REEDUCATION: CPT

## 2022-11-28 NOTE — PROGRESS NOTES
PT DAILY TREATMENT NOTE     Patient Name: Imani Alfonso  Date:2022  : 1985  [x]  Patient  Verified  Payor: [facility] MEDICAID / Plan: PORTIA BELCHER [facility] / Product Type: Managed Care Medicaid /    In time:1:10  Out time:1:50  Total Treatment Time (min): 40  Visit #: 4 of 6-8    Medicare/BCBS Only   Total Timed Codes (min):  40 1:1 Treatment Time:  40       Treatment Area: Other low back pain [M54.59]    SUBJECTIVE  Pain Level (0-10 scale): 2/10  Any medication changes, allergies to medications, adverse drug reactions, diagnosis change, or new procedure performed?: [x] No    [] Yes (see summary sheet for update)  Subjective functional status/changes:   [] No changes reported  Pt reports no new complaints of pain. Pt reports compliance with HEP. OBJECTIVE    20 min Therapeutic Exercise:  [x] See flow sheet :   Rationale: increase ROM and increase strength to improve the patients ability to perform ADLs with increased ease. 10 min Therapeutic Activity:  [x]  See flow sheet :   Rationale: increase strength, improve coordination, and increase proprioception  to improve the patients ability to perform ADLs with increased ease. 10 min Neuromuscular Re-education:  [x]  See flow sheet :   Rationale: increase strength, improve coordination, and increase proprioception  to improve the patients ability to perform ADLs with increased ease. With   [] TE   [] TA   [] neuro   [] other: Patient Education: [x] Review HEP    [] Progressed/Changed HEP based on:   [] positioning   [] body mechanics   [] transfers   [] heat/ice application    [] other:      Other Objective/Functional Measures: Pt has good form with all exercises and no reports of increased pain. Pain Level (0-10 scale) post treatment: 2/10    ASSESSMENT/Changes in Function: Pt continues to have reports of improved functional ability and decreased pain since [facility].   Consider DC in 2 visits if patients symptoms continue to improve. Discussed DC plan with patient. Patient will continue to benefit from skilled PT services to modify and progress therapeutic interventions, address functional mobility deficits, address ROM deficits, address strength deficits, analyze and address soft tissue restrictions, analyze and cue movement patterns, and analyze and modify body mechanics/ergonomics to attain remaining goals. []  See Plan of Care  []  See progress note/recertification  []  See Discharge Summary         Progress towards goals / Updated goals:  1. Improve FOTO score to 66 to improve ability for daily activities              IE- 49              Current: Slight decrease to 47 11/03/2022                 2. The pt will demonstrate 4+/5 B glute max strength without pain increase for improved ability for functional tasks              IE- 4-/5 B with pain; Not tested 11/03/2022              Current: Progressing, 4/5 11/21/22                 3. The pt will report at least 75% improvement for improved ability for functional tasks              PN No change in percentage to date   Current: Met per patient report 95% improvement. 11/28/2022              4. The pt will demonstrate no difficulty with bending or stooping. PN: remains quite a bit of difficulty    Current: Per patient report no difficulty. 11/28/2022                 5. The pt will report no pain increase with lumbar flexion for improved ability for ADls              PN: No change, + pain with flexion               Current: Pts pain rating continues to decrease.  11/23/2022    PLAN  []  Upgrade activities as tolerated     [x]  Continue plan of care  []  Update interventions per flow sheet       []  Discharge due to:_  []  Other:_      Arabella Hogan PTA 11/28/2022  1:12 PM    Future Appointments   Date Time Provider Ana Galan   11/28/2022  1:15 PM Eugenia Cheema, PTA [facility]   11/30/2022  1:45 PM Adeel Adams, PT [facility] 12/5/2022  3:00 PM Thom Rocha, PT [facility]   12/7/2022  8:40 AM Michael Tolbert MD [facility] BS AMB

## 2022-11-30 ENCOUNTER — APPOINTMENT (OUTPATIENT)
Dept: PHYSICAL THERAPY | Age: 37
End: 2022-11-30
Attending: INTERNAL MEDICINE
Payer: MEDICAID

## 2022-12-05 ENCOUNTER — HOSPITAL ENCOUNTER (OUTPATIENT)
Dept: PHYSICAL THERAPY | Age: 37
Discharge: HOME | End: 2022-12-05
Attending: INTERNAL MEDICINE

## 2022-12-05 PROCEDURE — 97110 THERAPEUTIC EXERCISES: CPT

## 2022-12-05 PROCEDURE — 97530 THERAPEUTIC ACTIVITIES: CPT

## 2022-12-05 PROCEDURE — 97112 NEUROMUSCULAR REEDUCATION: CPT
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
## 2023-02-16 NOTE — NURSE NAVIGATOR
Per MBSAQIP requirements:  Letter and email sent requesting follow up appointment. [address] [facility]  [address]   [address], [address]                 [address] [facility] Weight Loss [place]  [facility]  [facility]      Dear Patient,    Your health is our main concern. It is important for your health to have follow-up lab work and to see your surgeon at 3 months, 6 months, 9 months and annually after your weight loss surgery. Additionally, the Department of Bariatric Surgery at our hospital is a member of the Metabolic and Bariatric Surgery Accreditation and Quality Improvement Program [facility]). As a participant in this program, we gather information on the outcomes of our patients after surgery. Please call the office for a follow up appointment at [phone]. If you have moved out of the area or have changed surgeons please call us and let us know the name of your doctor. Your health and feedback are important to us. We greatly appreciate your response.        Thank you,  [address] [facility] [address]

## 2023-02-23 ENCOUNTER — OFFICE VISIT (OUTPATIENT)
Age: 38
End: 2023-02-23
Payer: COMMERCIAL

## 2023-02-23 VITALS
HEART RATE: 77 BPM | HEIGHT: 67 IN | OXYGEN SATURATION: 96 % | SYSTOLIC BLOOD PRESSURE: 139 MMHG | BODY MASS INDEX: 36.1 KG/M2 | DIASTOLIC BLOOD PRESSURE: 78 MMHG | WEIGHT: 230 LBS | TEMPERATURE: 98.8 F | RESPIRATION RATE: 16 BRPM
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
## 2023-02-23 DIAGNOSIS — G47.30 SLEEP APNEA, UNSPECIFIED TYPE: ICD-10-CM

## 2023-02-23 DIAGNOSIS — E11.22 TYPE 2 DIABETES MELLITUS WITH CHRONIC KIDNEY DISEASE, WITHOUT LONG-TERM CURRENT USE OF INSULIN, UNSPECIFIED CKD STAGE (HCC): Primary | ICD-10-CM

## 2023-02-23 DIAGNOSIS — I10 ESSENTIAL (PRIMARY) HYPERTENSION: ICD-10-CM

## 2023-02-23 DIAGNOSIS — N18.32 STAGE 3B CHRONIC KIDNEY DISEASE (HCC): ICD-10-CM

## 2023-02-23 PROCEDURE — 3078F DIAST BP <80 MM HG: CPT | Performed by: INTERNAL MEDICINE

## 2023-02-23 PROCEDURE — 99214 OFFICE O/P EST MOD 30 MIN: CPT | Performed by: INTERNAL MEDICINE

## 2023-02-23 PROCEDURE — 3075F SYST BP GE 130 - 139MM HG: CPT | Performed by: INTERNAL MEDICINE

## 2023-02-23 SDOH — ECONOMIC STABILITY: INCOME INSECURITY: HOW HARD IS IT FOR YOU TO PAY FOR THE VERY BASICS LIKE FOOD, HOUSING, MEDICAL CARE, AND HEATING?: NOT HARD AT ALL

## 2023-02-23 SDOH — ECONOMIC STABILITY: FOOD INSECURITY: WITHIN THE PAST 12 MONTHS, YOU WORRIED THAT YOUR FOOD WOULD RUN OUT BEFORE YOU GOT MONEY TO BUY MORE.: NEVER TRUE

## 2023-02-23 SDOH — ECONOMIC STABILITY: HOUSING INSECURITY
IN THE LAST 12 MONTHS, WAS THERE A TIME WHEN YOU DID NOT HAVE A STEADY PLACE TO SLEEP OR SLEPT IN A SHELTER (INCLUDING NOW)?: NO

## 2023-02-23 SDOH — ECONOMIC STABILITY: FOOD INSECURITY: WITHIN THE PAST 12 MONTHS, THE FOOD YOU BOUGHT JUST DIDN'T LAST AND YOU DIDN'T HAVE MONEY TO GET MORE.: NEVER TRUE

## 2023-02-23 ASSESSMENT — PATIENT HEALTH QUESTIONNAIRE - PHQ9
SUM OF ALL RESPONSES TO PHQ QUESTIONS 1-9: 0
SUM OF ALL RESPONSES TO PHQ9 QUESTIONS 1 & 2: 0
1. LITTLE INTEREST OR PLEASURE IN DOING THINGS: 0
2. FEELING DOWN, DEPRESSED OR HOPELESS: 0

## 2023-02-23 ASSESSMENT — ENCOUNTER SYMPTOMS
COUGH: 0
ABDOMINAL PAIN: 0
SHORTNESS OF BREATH: 0
BLOOD IN STOOL: 0
EYE PAIN: 0
ANAL BLEEDING: 0
SORE THROAT: 0
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
## 2023-02-23 NOTE — PROGRESS NOTES
Carlos Steiner presents today for No chief complaint on file.        Follow up          1. \"Have you been to the ER, urgent care clinic since your last visit?  Hospitalized since your last visit?\" NO    2. \"Have you seen or consulted any other health care providers outside of the [facility] since your last visit?\" YES     3. For patients aged 45-75: Has the patient had a colonoscopy / FIT/ Cologuard? NA - based on age      If the patient is female:    4. For patients aged 40-74: Has the patient had a mammogram within the past 2 years? NA - based on age or sex      5. For patients aged 21-65: Has the patient had a pap smear? NA - based on age or sex

## 2023-02-23 NOTE — PATIENT INSTRUCTIONS
Learning About Tests When You Have Diabetes  Why do you need regular tests? Diabetes can lead to other health problems if it's not well managed. You'll need tests to monitor how well your diabetes is managed and to check for other things like high cholesterol or kidney problems. Having tests on a regular schedule can help your doctor find problems early, when it's best to start treating them. What tests do you need? These are the tests you may need and how often you should have them. The tests may vary depending on whether you have type 1 or type 2 diabetes. A1c blood test.  This test shows the average level of blood sugar over the past 2 to 3 months. It helps your doctor see whether blood sugar levels have been staying within your target range. How often: Every 3 to 6 months  Goal: A blood sugar level in your target range  Blood pressure test.  This test measures the pressure of blood flow in the arteries. Controlling blood pressure can help prevent damage to nerves and blood vessels. How often: Every 3 to 6 months  Goal: A blood pressure level in your target range  Cholesterol test.  This test measures the amount of a type of fat in the blood. It is common for people with diabetes to also have high cholesterol. Too much cholesterol in the blood can build up inside the blood vessels and raise the risk for heart attack and stroke. How often: At the time of your diabetes diagnosis, and as often as your doctor recommends after that  Goal: A cholesterol level in your target range  Albumin-creatinine ratio test.  This test checks for kidney damage by looking for the protein albumin (say \"al-BYOO-penelope\") in the urine. Albumin is normally found in the blood. Kidney damage can let small amounts of it (microalbumin) leak into the urine. How often: Once a year  Goal: No protein in the urine  Blood creatinine test/estimated glomerular filtration (eGFR).   The blood creatinine (say \"dmyp-JX-ej-neen\") level shows how well your kidneys are working. Creatinine is a waste product that muscles release into the blood. Blood creatinine is used to estimate the glomerular filtration rate. A high level of creatinine and/or a low eGFR may mean your kidneys are not working as well as they should. How often: Once a year  Goal: Normal level of creatinine in the blood. The eGFR goal is greater than 60 mL/min/1.73 m². Complete foot exam.  The doctor checks for foot sores and whether any sensation has been lost.  How often: Once a year  Goal: Healthy feet with no foot ulcers or loss of feeling  Dental exam and cleaning. The dentist checks for gum disease and tooth decay. People with high blood sugar are more likely to have these problems. How often: Every 6 months  Goal: Healthy teeth and gums  Complete eye exam.  High blood sugar levels can damage the eyes. This exam is done by an ophthalmologist or optometrist. It includes a dilated eye exam. The exam shows whether there's damage to the back of the eye (diabetic retinopathy). How often: Once a year. If you don't have any signs of diabetic retinopathy, your doctor may recommend an exam every 2 years. Goal: No damage to the back of the eye  Thyroid-stimulating hormone (TSH) blood test.  This test checks for thyroid disease, which is especially common for people with type 1 diabetes. Having too little or too much thyroid hormone can make it hard to manage your blood sugar. How often: As part of your diabetes diagnosis, and as often as your doctor recommends after that  Goal: Normal level of TSH in the blood  Follow-up care is a key part of your treatment and safety. Be sure to make and go to all appointments, and call your doctor if you are having problems. It's also a good idea to know your test results and keep a list of the medicines you take. Where can you learn more?   Go to http://www.woods.com/ and enter A243 to learn more about \"Learning About Tests When You Have Diabetes. \"  Current as of: April 13, 2022               Content Version: 13.5  © 9080-8290 [employer], Incorporated. Care instructions adapted under license by [facility] [address] If you have questions about a medical condition or this instruction, always ask your healthcare professional. [address] any warranty or liability for your use of this information.          Please schedule him for a follow-up visit with me in 4 months with lab schedule I week before

## 2023-02-24 LAB
ALBUMIN SERPL-MCNC: 3.5 G/DL (ref 4–5)
ALBUMIN/CREAT UR: 2420 MG/G CREAT (ref 0–29)
ALBUMIN/GLOB SERPL: 1.4 {RATIO} (ref 1.2–2.2)
ALP SERPL-CCNC: 76 IU/L (ref 44–121)
ALT SERPL-CCNC: 27 IU/L (ref 0–44)
AST SERPL-CCNC: 19 IU/L (ref 0–40)
BASOPHILS # BLD AUTO: 0 X10E3/UL (ref 0–0.2)
BASOPHILS NFR BLD AUTO: 1 %
BILIRUB SERPL-MCNC: 0.4 MG/DL (ref 0–1.2)
BUN SERPL-MCNC: 33 MG/DL (ref 6–20)
BUN/CREAT SERPL: 18 (ref 9–20)
CALCIUM SERPL-MCNC: 8.8 MG/DL (ref 8.7–10.2)
CHLORIDE SERPL-SCNC: 108 MMOL/L (ref 96–106)
CHOLEST SERPL-MCNC: 135 MG/DL (ref 100–199)
CO2 SERPL-SCNC: 22 MMOL/L (ref 20–29)
CREAT SERPL-MCNC: 1.84 MG/DL (ref 0.76–1.27)
CREAT UR-MCNC: 81.6 MG/DL
EGFRCR SERPLBLD CKD-EPI 2021: 48 ML/MIN/1.73
EOSINOPHIL # BLD AUTO: 0.4 X10E3/UL (ref 0–0.4)
EOSINOPHIL NFR BLD AUTO: 8 %
ERYTHROCYTE [DISTWIDTH] IN BLOOD BY AUTOMATED COUNT: 13.2 % (ref 11.6–15.4)
GLOBULIN SER CALC-MCNC: 2.5 G/DL (ref 1.5–4.5)
GLUCOSE SERPL-MCNC: 79 MG/DL (ref 70–99)
HBA1C MFR BLD: 5.2 % (ref 4.8–5.6)
HCT VFR BLD AUTO: 36.5 % (ref 37.5–51)
HDLC SERPL-MCNC: 47 MG/DL
HGB BLD-MCNC: 11.9 G/DL (ref 13–17.7)
IMM GRANULOCYTES # BLD AUTO: 0 X10E3/UL (ref 0–0.1)
IMM GRANULOCYTES NFR BLD AUTO: 0 %
IMP & REVIEW OF LAB RESULTS: NORMAL
LDLC SERPL CALC-MCNC: 72 MG/DL (ref 0–99)
LYMPHOCYTES # BLD AUTO: 1.8 X10E3/UL (ref 0.7–3.1)
LYMPHOCYTES NFR BLD AUTO: 31 %
MCH RBC QN AUTO: 27.7 PG (ref 26.6–33)
MCHC RBC AUTO-ENTMCNC: 32.6 G/DL (ref 31.5–35.7)
MCV RBC AUTO: 85 FL (ref 79–97)
MICROALBUMIN UR-MCNC: 1974.6 UG/ML
MONOCYTES # BLD AUTO: 0.5 X10E3/UL (ref 0.1–0.9)
MONOCYTES NFR BLD AUTO: 9 %
NEUTROPHILS # BLD AUTO: 2.9 X10E3/UL (ref 1.4–7)
NEUTROPHILS NFR BLD AUTO: 51 %
PLATELET # BLD AUTO: 261 X10E3/UL (ref 150–450)
POTASSIUM SERPL-SCNC: 5.6 MMOL/L (ref 3.5–5.2)
PROT SERPL-MCNC: 6 G/DL (ref 6–8.5)
RBC # BLD AUTO: 4.3 X10E6/UL (ref 4.14–5.8)
REPORT: NORMAL
SODIUM SERPL-SCNC: 140 MMOL/L (ref 134–144)
SPECIMEN STATUS REPORT, ROLRST: NORMAL
TRIGL SERPL-MCNC: 83 MG/DL (ref 0–149)
VLDLC SERPL CALC-MCNC: 16 MG/DL (ref 5–40)
WBC # BLD AUTO: 5.7 X10E3/UL (ref 3.4–10.8)

## 2023-04-03 RX ORDER — CARVEDILOL 25 MG/1
TABLET ORAL
Qty: 60 TABLET | Refills: 0 | Status: SHIPPED | OUTPATIENT
Start: 2023-04-03

## 2023-04-03 NOTE — TELEPHONE ENCOUNTER
PCP: Tari Liu MD    Last appt: 2/23/2023   Future Appointments   Date Time Provider Marjan Santos   6/16/2023 10:40 AM [facility] LAB VISIT [facility] RACHELL COLLINS   6/23/2023  2:20 PM Tari Liu MD [facility] BS KARINA       Requested Prescriptions     Pending Prescriptions Disp Refills    carvedilol (COREG) 25 MG tablet [Pharmacy Med Name: Carvedilol 25 MG Oral Tablet] 60 tablet 0     Sig: TAKE 1 TABLET BY MOUTH TWICE DAILY WITH MEALS

## 2023-05-08 RX ORDER — CARVEDILOL 25 MG/1
TABLET ORAL
Qty: 60 TABLET | Refills: 5 | Status: SHIPPED | OUTPATIENT
Start: 2023-05-08

## 2023-06-05 ENCOUNTER — TELEPHONE (OUTPATIENT)
Age: 38
End: 2023-06-05

## 2023-06-05 RX ORDER — HYDRALAZINE HYDROCHLORIDE 50 MG/1
50 TABLET, FILM COATED ORAL 3 TIMES DAILY
Qty: 90 TABLET | Refills: 3 | Status: SHIPPED | OUTPATIENT
Start: 2023-06-05

## 2023-06-05 NOTE — TELEPHONE ENCOUNTER
Requested refill:    - hydrALAZINE (APRESOLINE) 50 MG tablet    Pharmacy: [facility]

## 2023-06-15 ENCOUNTER — HOSPITAL ENCOUNTER (OUTPATIENT)
Facility: HOSPITAL | Age: 38
Discharge: HOME | End: 2023-06-18

## 2023-06-15 LAB — LABCORP SPECIMEN COLLECTION: NORMAL

## 2023-07-12 RX ORDER — ATORVASTATIN CALCIUM 40 MG/1
TABLET, FILM COATED ORAL
Qty: 90 TABLET | Refills: 1 | Status: SHIPPED | OUTPATIENT
Start: 2023-07-12

## 2023-07-12 NOTE — TELEPHONE ENCOUNTER
Please refill if appropriate or refuse medication if not appropriate.     PCP: Jenna Caldwell MD     Last appt: 2/23/23    Last refill: 10/6/22      Future Appointments   Date Time Provider [address] [place] [address]   8/23/2023 11:20 AM Jenna Caldwell MD [facility] AMB         Requested Prescriptions     Pending Prescriptions Disp Refills    atorvastatin (LIPITOR) 40 MG tablet [Pharmacy Med Name: Atorvastatin Calcium 40 MG Oral Tablet] 90 tablet 0     Sig: Take 1 tablet by mouth once daily

## 2023-08-09 ENCOUNTER — TELEPHONE (OUTPATIENT)
Age: 38
End: 2023-08-09

## 2023-08-10 ENCOUNTER — TELEPHONE (OUTPATIENT)
Age: 38
End: 2023-08-10

## 2023-08-10 NOTE — TELEPHONE ENCOUNTER
Kait Solo is calling from [employer]. Patient is scheduled to see Dr. Carolann Saldana on 8/23/23. She needs us to fax that note once it has been signed.      Fax: [phone]

## 2023-08-23 ENCOUNTER — OFFICE VISIT (OUTPATIENT)
Age: 38
End: 2023-08-23
Payer: MEDICAID

## 2023-08-23 VITALS
DIASTOLIC BLOOD PRESSURE: 73 MMHG | HEIGHT: 67 IN | TEMPERATURE: 99 F | OXYGEN SATURATION: 98 % | RESPIRATION RATE: 18 BRPM | BODY MASS INDEX: 35.98 KG/M2 | SYSTOLIC BLOOD PRESSURE: 134 MMHG | WEIGHT: 229.25 LBS | HEART RATE: 76 BPM

## 2023-08-23 DIAGNOSIS — E78.5 HYPERLIPIDEMIA, UNSPECIFIED HYPERLIPIDEMIA TYPE: ICD-10-CM

## 2023-08-23 DIAGNOSIS — I10 ESSENTIAL (PRIMARY) HYPERTENSION: ICD-10-CM

## 2023-08-23 DIAGNOSIS — N18.30 STAGE 3 CHRONIC KIDNEY DISEASE, UNSPECIFIED WHETHER STAGE 3A OR 3B CKD (HCC): ICD-10-CM

## 2023-08-23 DIAGNOSIS — E11.22 TYPE 2 DIABETES MELLITUS WITH CHRONIC KIDNEY DISEASE, WITHOUT LONG-TERM CURRENT USE OF INSULIN, UNSPECIFIED CKD STAGE (HCC): Primary | ICD-10-CM

## 2023-08-23 PROCEDURE — 3044F HG A1C LEVEL LT 7.0%: CPT | Performed by: INTERNAL MEDICINE

## 2023-08-23 PROCEDURE — 99211 OFF/OP EST MAY X REQ PHY/QHP: CPT

## 2023-08-23 PROCEDURE — 3074F SYST BP LT 130 MM HG: CPT | Performed by: INTERNAL MEDICINE

## 2023-08-23 PROCEDURE — 99214 OFFICE O/P EST MOD 30 MIN: CPT | Performed by: INTERNAL MEDICINE

## 2023-08-23 PROCEDURE — 3078F DIAST BP <80 MM HG: CPT | Performed by: INTERNAL MEDICINE

## 2023-08-24 ENCOUNTER — TELEPHONE (OUTPATIENT)
Age: 38
End: 2023-08-24

## 2023-08-24 NOTE — TELEPHONE ENCOUNTER
Juana with [employer] calling for last OV note.      Stated pt was referred to them for diabetic shoes by Dr Ulysses Ding, podiatrist.    Ph [phone]   fax [phone]

## 2023-08-29 ASSESSMENT — ENCOUNTER SYMPTOMS
EYE PAIN: 0
SHORTNESS OF BREATH: 0
COUGH: 0
ANAL BLEEDING: 0
SORE THROAT: 0
BLOOD IN STOOL: 0
ABDOMINAL PAIN: 0

## 2023-09-27 RX ORDER — FLUTICASONE PROPIONATE 50 MCG
SPRAY, SUSPENSION (ML) NASAL
Qty: 16 G | Refills: 5 | Status: SHIPPED | OUTPATIENT
Start: 2023-09-27

## 2023-12-28 RX ORDER — EZETIMIBE 10 MG/1
TABLET ORAL
Qty: 90 TABLET | Refills: 0 | Status: SHIPPED | OUTPATIENT
Start: 2023-12-28

## 2023-12-29 NOTE — TELEPHONE ENCOUNTER
Please schedule him for a follow up apt with me with labs scheduled 1 wk before his apt.     Dr. Grupo Hamm  [facility] [address] [place] [address]  Phone: [phone]  Fax: [phone]

## 2024-03-02 DIAGNOSIS — E11.22 TYPE 2 DIABETES MELLITUS WITH CHRONIC KIDNEY DISEASE, WITHOUT LONG-TERM CURRENT USE OF INSULIN, UNSPECIFIED CKD STAGE (HCC): Primary | ICD-10-CM

## 2024-03-02 DIAGNOSIS — N18.30 STAGE 3 CHRONIC KIDNEY DISEASE, UNSPECIFIED WHETHER STAGE 3A OR 3B CKD (HCC): ICD-10-CM

## 2024-03-02 DIAGNOSIS — D63.1 ANEMIA DUE TO STAGE 3 CHRONIC KIDNEY DISEASE, UNSPECIFIED WHETHER STAGE 3A OR 3B CKD (HCC): ICD-10-CM

## 2024-03-02 DIAGNOSIS — N18.30 ANEMIA DUE TO STAGE 3 CHRONIC KIDNEY DISEASE, UNSPECIFIED WHETHER STAGE 3A OR 3B CKD (HCC): ICD-10-CM

## 2024-03-04 NOTE — TELEPHONE ENCOUNTER
Last OV: 8/23/2023  No future appointment  Last refill:    7/112/2023 -- atorvastatin  5/8/2023 -- carvedilol

## 2024-03-06 RX ORDER — ATORVASTATIN CALCIUM 40 MG/1
TABLET, FILM COATED ORAL
Qty: 30 TABLET | Refills: 1 | Status: SHIPPED | OUTPATIENT
Start: 2024-03-06

## 2024-03-06 RX ORDER — CARVEDILOL 25 MG/1
TABLET ORAL
Qty: 60 TABLET | Refills: 2 | Status: SHIPPED | OUTPATIENT
Start: 2024-03-06

## 2024-03-06 NOTE — TELEPHONE ENCOUNTER
Please schedule him for labs 1 wk before his apt. Please have him scheduled to see me in late April or early May.     Dr. Didi Zamora  [facility] [address], [address]  [place] [address]  Phone: [phone]  Fax: [phone]

## 2024-03-07 RX ORDER — HYDRALAZINE HYDROCHLORIDE 50 MG/1
50 TABLET, FILM COATED ORAL 3 TIMES DAILY
Qty: 90 TABLET | Refills: 0 | Status: SHIPPED | OUTPATIENT
Start: 2024-03-07

## 2024-04-14 RX ORDER — EZETIMIBE 10 MG/1
TABLET ORAL
Qty: 90 TABLET | Refills: 0 | Status: SHIPPED | OUTPATIENT
Start: 2024-04-14

## 2024-06-24 RX ORDER — ATORVASTATIN CALCIUM 40 MG/1
TABLET, FILM COATED ORAL
Qty: 30 TABLET | Refills: 1 | Status: SHIPPED | OUTPATIENT
Start: 2024-06-24

## 2024-08-18 DIAGNOSIS — N18.30 ANEMIA DUE TO STAGE 3 CHRONIC KIDNEY DISEASE, UNSPECIFIED WHETHER STAGE 3A OR 3B CKD (HCC): ICD-10-CM

## 2024-08-18 DIAGNOSIS — N18.30 STAGE 3 CHRONIC KIDNEY DISEASE, UNSPECIFIED WHETHER STAGE 3A OR 3B CKD (HCC): ICD-10-CM

## 2024-08-18 DIAGNOSIS — E78.5 HYPERLIPIDEMIA, UNSPECIFIED HYPERLIPIDEMIA TYPE: ICD-10-CM

## 2024-08-18 DIAGNOSIS — D63.1 ANEMIA DUE TO STAGE 3 CHRONIC KIDNEY DISEASE, UNSPECIFIED WHETHER STAGE 3A OR 3B CKD (HCC): ICD-10-CM

## 2024-08-18 DIAGNOSIS — E11.22 TYPE 2 DIABETES MELLITUS WITH CHRONIC KIDNEY DISEASE, WITHOUT LONG-TERM CURRENT USE OF INSULIN, UNSPECIFIED CKD STAGE (HCC): Primary | ICD-10-CM

## 2024-08-18 DIAGNOSIS — I10 ESSENTIAL (PRIMARY) HYPERTENSION: ICD-10-CM

## 2024-08-21 RX ORDER — HYDRALAZINE HYDROCHLORIDE 50 MG/1
50 TABLET, FILM COATED ORAL 3 TIMES DAILY
Qty: 90 TABLET | Refills: 0 | Status: SHIPPED | OUTPATIENT
Start: 2024-08-21

## 2024-08-21 RX ORDER — EZETIMIBE 10 MG/1
TABLET ORAL
Qty: 30 TABLET | Refills: 0 | Status: SHIPPED | OUTPATIENT
Start: 2024-08-21

## 2024-08-22 NOTE — TELEPHONE ENCOUNTER
Please schedule him for an apt. I have not seen him this year at all. He is overdue for labs. Please have him scheduled for labs.      Dr. Didi Zamora  [facility] [address], [address]  [place] [address]  Phone: [phone]  Fax: [phone]

## 2024-10-04 RX ORDER — ATORVASTATIN CALCIUM 40 MG/1
TABLET, FILM COATED ORAL
Qty: 30 TABLET | Refills: 0 | OUTPATIENT
Start: 2024-10-04

## 2024-10-04 RX ORDER — FLUTICASONE PROPIONATE 50 MCG
SPRAY, SUSPENSION (ML) NASAL
Qty: 16 G | Refills: 0 | OUTPATIENT
Start: 2024-10-04

## 2024-10-24 ENCOUNTER — HOSPITAL ENCOUNTER (OUTPATIENT)
Facility: HOSPITAL | Age: 39
Setting detail: SPECIMEN
Discharge: HOME | End: 2024-10-27
Payer: MEDICARE

## 2024-10-24 DIAGNOSIS — N18.30 ANEMIA DUE TO STAGE 3 CHRONIC KIDNEY DISEASE, UNSPECIFIED WHETHER STAGE 3A OR 3B CKD (HCC): ICD-10-CM

## 2024-10-24 DIAGNOSIS — E78.5 HYPERLIPIDEMIA, UNSPECIFIED HYPERLIPIDEMIA TYPE: ICD-10-CM

## 2024-10-24 DIAGNOSIS — E11.22 TYPE 2 DIABETES MELLITUS WITH CHRONIC KIDNEY DISEASE, WITHOUT LONG-TERM CURRENT USE OF INSULIN, UNSPECIFIED CKD STAGE (HCC): ICD-10-CM

## 2024-10-24 DIAGNOSIS — D63.1 ANEMIA DUE TO STAGE 3 CHRONIC KIDNEY DISEASE, UNSPECIFIED WHETHER STAGE 3A OR 3B CKD (HCC): ICD-10-CM

## 2024-10-24 DIAGNOSIS — N18.30 STAGE 3 CHRONIC KIDNEY DISEASE, UNSPECIFIED WHETHER STAGE 3A OR 3B CKD (HCC): ICD-10-CM

## 2024-10-24 DIAGNOSIS — I10 ESSENTIAL (PRIMARY) HYPERTENSION: ICD-10-CM

## 2024-10-24 LAB
ALBUMIN SERPL-MCNC: 3.1 G/DL (ref 3.4–5)
ALBUMIN/GLOB SERPL: 0.9 (ref 0.8–1.7)
ALP SERPL-CCNC: 90 U/L (ref 45–117)
ALT SERPL-CCNC: 25 U/L (ref 16–61)
ANION GAP SERPL CALC-SCNC: 7 MMOL/L (ref 3–18)
AST SERPL-CCNC: 10 U/L (ref 10–38)
BASOPHILS # BLD: 0 K/UL (ref 0–0.1)
BASOPHILS NFR BLD: 0 % (ref 0–2)
BILIRUB SERPL-MCNC: 0.3 MG/DL (ref 0.2–1)
BUN SERPL-MCNC: 71 MG/DL (ref 7–18)
BUN/CREAT SERPL: 15 (ref 12–20)
CALCIUM SERPL-MCNC: 8.2 MG/DL (ref 8.5–10.1)
CHLORIDE SERPL-SCNC: 111 MMOL/L (ref 100–111)
CHOLEST SERPL-MCNC: 232 MG/DL
CO2 SERPL-SCNC: 23 MMOL/L (ref 21–32)
CREAT SERPL-MCNC: 4.61 MG/DL (ref 0.6–1.3)
CREAT UR-MCNC: 72 MG/DL (ref 30–125)
DIFFERENTIAL METHOD BLD: ABNORMAL
EOSINOPHIL # BLD: 0.5 K/UL (ref 0–0.4)
EOSINOPHIL NFR BLD: 6 % (ref 0–5)
ERYTHROCYTE [DISTWIDTH] IN BLOOD BY AUTOMATED COUNT: 12.5 % (ref 11.6–14.5)
EST. AVERAGE GLUCOSE BLD GHB EST-MCNC: 97 MG/DL
FERRITIN SERPL-MCNC: 161 NG/ML (ref 8–388)
GLOBULIN SER CALC-MCNC: 3.3 G/DL (ref 2–4)
GLUCOSE SERPL-MCNC: 93 MG/DL (ref 74–99)
HBA1C MFR BLD: 5 % (ref 4.2–5.6)
HCT VFR BLD AUTO: 34.6 % (ref 36–48)
HDLC SERPL-MCNC: 47 MG/DL (ref 40–60)
HDLC SERPL: 4.9 (ref 0–5)
HGB BLD-MCNC: 11 G/DL (ref 13–16)
IMM GRANULOCYTES # BLD AUTO: 0 K/UL (ref 0–0.04)
IMM GRANULOCYTES NFR BLD AUTO: 0 % (ref 0–0.5)
IRON SATN MFR SERPL: 35 % (ref 20–50)
IRON SERPL-MCNC: 72 UG/DL (ref 50–175)
LDLC SERPL CALC-MCNC: 134.4 MG/DL (ref 0–100)
LIPID PANEL: ABNORMAL
LYMPHOCYTES # BLD: 2.7 K/UL (ref 0.9–3.6)
LYMPHOCYTES NFR BLD: 29 % (ref 21–52)
MCH RBC QN AUTO: 27.9 PG (ref 24–34)
MCHC RBC AUTO-ENTMCNC: 31.8 G/DL (ref 31–37)
MCV RBC AUTO: 87.8 FL (ref 78–100)
MICROALBUMIN UR-MCNC: 308 MG/DL (ref 0–3)
MICROALBUMIN/CREAT UR-RTO: 4278 MG/G (ref 0–30)
MONOCYTES # BLD: 0.7 K/UL (ref 0.05–1.2)
MONOCYTES NFR BLD: 7 % (ref 3–10)
NEUTS SEG # BLD: 5.4 K/UL (ref 1.8–8)
NEUTS SEG NFR BLD: 58 % (ref 40–73)
NRBC # BLD: 0 K/UL (ref 0–0.01)
NRBC BLD-RTO: 0 PER 100 WBC
PLATELET # BLD AUTO: 301 K/UL (ref 135–420)
PMV BLD AUTO: 10.2 FL (ref 9.2–11.8)
POTASSIUM SERPL-SCNC: 4.9 MMOL/L (ref 3.5–5.5)
PROT SERPL-MCNC: 6.4 G/DL (ref 6.4–8.2)
RBC # BLD AUTO: 3.94 M/UL (ref 4.35–5.65)
SODIUM SERPL-SCNC: 141 MMOL/L (ref 136–145)
TIBC SERPL-MCNC: 206 UG/DL (ref 250–450)
TRIGL SERPL-MCNC: 253 MG/DL
VLDLC SERPL CALC-MCNC: 50.6 MG/DL
WBC # BLD AUTO: 9.4 K/UL (ref 4.6–13.2)

## 2024-10-24 PROCEDURE — 83550 IRON BINDING TEST: CPT

## 2024-10-24 PROCEDURE — 80061 LIPID PANEL: CPT

## 2024-10-24 PROCEDURE — 80053 COMPREHEN METABOLIC PANEL: CPT

## 2024-10-24 PROCEDURE — 36415 COLL VENOUS BLD VENIPUNCTURE: CPT

## 2024-10-24 PROCEDURE — 82728 ASSAY OF FERRITIN: CPT

## 2024-10-24 PROCEDURE — 83540 ASSAY OF IRON: CPT

## 2024-10-24 PROCEDURE — 85025 COMPLETE CBC W/AUTO DIFF WBC: CPT

## 2024-10-24 PROCEDURE — 82570 ASSAY OF URINE CREATININE: CPT

## 2024-10-24 PROCEDURE — 82043 UR ALBUMIN QUANTITATIVE: CPT

## 2024-10-24 PROCEDURE — 83036 HEMOGLOBIN GLYCOSYLATED A1C: CPT

## 2024-10-28 ENCOUNTER — TELEPHONE (OUTPATIENT)
Facility: CLINIC | Age: 39
End: 2024-10-28

## 2024-10-28 NOTE — TELEPHONE ENCOUNTER
----- Message from Dr. iDdi Zamora MD sent at 10/25/2024  1:12 PM EDT -----  Regarding: Apt needed  Please have him scheduled to see me to discuss his results. It must be an in office apt since I haven't seen him this yr.      Respectfully,  Dr. Didi Zamora  [facility] [address], [address]  [place] [address]  Phone: [phone]  Fax: [phone]  ----- Message -----  From: Bandar Denis Incoming [facility] For [facility]  Sent: 10/24/2024   7:34 PM EDT  To: Didi Zamora MD

## 2024-10-28 NOTE — TELEPHONE ENCOUNTER
Attempted to call pt to schedule an appt to discuss lab results. No answer. Left VM to return call.      JAMES Gonzalez LPN

## 2024-11-11 RX ORDER — FLUTICASONE PROPIONATE 50 MCG
SPRAY, SUSPENSION (ML) NASAL
Qty: 16 G | Refills: 0 | OUTPATIENT
Start: 2024-11-11

## 2024-11-11 RX ORDER — ATORVASTATIN CALCIUM 40 MG/1
TABLET, FILM COATED ORAL
Qty: 30 TABLET | Refills: 0 | OUTPATIENT
Start: 2024-11-11

## 2024-11-11 NOTE — TELEPHONE ENCOUNTER
Please have him scheduled for an in office appointment to discuss his lab results and for a checkup.  I have not seen him in over a year and will not refill any additional medications without an in office exam.    Dr. Didi Zamora  [facility] [address], [address]  [place] [address]  Phone: [phone]  Fax: [phone]

## 2024-12-27 NOTE — TELEPHONE ENCOUNTER
Last OV: 8/23/2023  No future appointment  Last refill: 11/14/2022 [Ear Pain] : ear pain [Negative] : Genitourinary

## 2025-01-16 ENCOUNTER — TELEPHONE (OUTPATIENT)
Facility: CLINIC | Age: 40
End: 2025-01-16

## 2025-01-16 NOTE — TELEPHONE ENCOUNTER
Patient was scheduled for an in office appt on 1/14/2025, however no-showed the appt.  Pt was left multiple VM according to chart. Pt's wife called who is on PHI concerned about the patient's health status. Pt had labs completed 10/24/2024, however never showed for follow up appt. Pt's wife stated that the patient has been going through a tough time and is struggling to stay up with his health. Pt wife stated that he is in fear of his diagnosis. Last refills was 8/21/2024. No additional refills w/o an appt per PCP. Offered pt and appt with PCP on tomorrow, however pt is unable make the appt and requested that the appt is Virtual. PCP is unable to complete A VV due to patient not being seen in over 1 year. Last appt was 8/2023.

## 2025-01-17 ENCOUNTER — OFFICE VISIT (OUTPATIENT)
Facility: CLINIC | Age: 40
End: 2025-01-17

## 2025-01-17 VITALS
HEIGHT: 67 IN | OXYGEN SATURATION: 100 % | WEIGHT: 260 LBS | HEART RATE: 117 BPM | DIASTOLIC BLOOD PRESSURE: 107 MMHG | SYSTOLIC BLOOD PRESSURE: 209 MMHG | TEMPERATURE: 98.2 F | BODY MASS INDEX: 40.81 KG/M2 | RESPIRATION RATE: 18 BRPM

## 2025-01-17 DIAGNOSIS — Z71.89 ADVANCE CARE PLANNING: ICD-10-CM

## 2025-01-17 DIAGNOSIS — E11.22 TYPE 2 DIABETES MELLITUS WITH CHRONIC KIDNEY DISEASE, WITHOUT LONG-TERM CURRENT USE OF INSULIN, UNSPECIFIED CKD STAGE (HCC): ICD-10-CM

## 2025-01-17 DIAGNOSIS — N18.4 CKD (CHRONIC KIDNEY DISEASE) STAGE 4, GFR 15-29 ML/MIN (HCC): Primary | ICD-10-CM

## 2025-01-17 DIAGNOSIS — I10 ESSENTIAL (PRIMARY) HYPERTENSION: ICD-10-CM

## 2025-01-17 DIAGNOSIS — Z00.00 INITIAL MEDICARE ANNUAL WELLNESS VISIT: ICD-10-CM

## 2025-01-17 DIAGNOSIS — F32.2 CURRENT SEVERE EPISODE OF MAJOR DEPRESSIVE DISORDER WITHOUT PSYCHOTIC FEATURES WITHOUT PRIOR EPISODE (HCC): ICD-10-CM

## 2025-01-17 DIAGNOSIS — E11.3593 PROLIFERATIVE DIABETIC RETINOPATHY OF BOTH EYES ASSOCIATED WITH TYPE 2 DIABETES MELLITUS, UNSPECIFIED PROLIFERATIVE RETINOPATHY TYPE (HCC): ICD-10-CM

## 2025-01-17 PROBLEM — N18.32 STAGE 3B CHRONIC KIDNEY DISEASE (HCC): Status: RESOLVED | Noted: 2021-09-12 | Resolved: 2025-01-17

## 2025-01-17 SDOH — ECONOMIC STABILITY: FOOD INSECURITY: WITHIN THE PAST 12 MONTHS, YOU WORRIED THAT YOUR FOOD WOULD RUN OUT BEFORE YOU GOT MONEY TO BUY MORE.: NEVER TRUE

## 2025-01-17 SDOH — ECONOMIC STABILITY: FOOD INSECURITY: WITHIN THE PAST 12 MONTHS, THE FOOD YOU BOUGHT JUST DIDN'T LAST AND YOU DIDN'T HAVE MONEY TO GET MORE.: NEVER TRUE

## 2025-01-17 ASSESSMENT — COLUMBIA-SUICIDE SEVERITY RATING SCALE - C-SSRS
4. HAVE YOU HAD THESE THOUGHTS AND HAD SOME INTENTION OF ACTING ON THEM?: NO
6. HAVE YOU EVER DONE ANYTHING, STARTED TO DO ANYTHING, OR PREPARED TO DO ANYTHING TO END YOUR LIFE?: NO
1. WITHIN THE PAST MONTH, HAVE YOU WISHED YOU WERE DEAD OR WISHED YOU COULD GO TO SLEEP AND NOT WAKE UP?: NO
2. HAVE YOU ACTUALLY HAD ANY THOUGHTS OF KILLING YOURSELF?: YES
5. HAVE YOU STARTED TO WORK OUT OR WORKED OUT THE DETAILS OF HOW TO KILL YOURSELF? DO YOU INTEND TO CARRY OUT THIS PLAN?: NO
3. HAVE YOU BEEN THINKING ABOUT HOW YOU MIGHT KILL YOURSELF?: NO

## 2025-01-17 ASSESSMENT — PATIENT HEALTH QUESTIONNAIRE - PHQ9
7. TROUBLE CONCENTRATING ON THINGS, SUCH AS READING THE NEWSPAPER OR WATCHING TELEVISION: SEVERAL DAYS
SUM OF ALL RESPONSES TO PHQ QUESTIONS 1-9: 14
2. FEELING DOWN, DEPRESSED OR HOPELESS: NEARLY EVERY DAY
3. TROUBLE FALLING OR STAYING ASLEEP: SEVERAL DAYS
SUM OF ALL RESPONSES TO PHQ9 QUESTIONS 1 & 2: 5
SUM OF ALL RESPONSES TO PHQ QUESTIONS 1-9: 14
1. LITTLE INTEREST OR PLEASURE IN DOING THINGS: MORE THAN HALF THE DAYS
8. MOVING OR SPEAKING SO SLOWLY THAT OTHER PEOPLE COULD HAVE NOTICED. OR THE OPPOSITE, BEING SO FIGETY OR RESTLESS THAT YOU HAVE BEEN MOVING AROUND A LOT MORE THAN USUAL: MORE THAN HALF THE DAYS
5. POOR APPETITE OR OVEREATING: MORE THAN HALF THE DAYS
SUM OF ALL RESPONSES TO PHQ QUESTIONS 1-9: 13
SUM OF ALL RESPONSES TO PHQ QUESTIONS 1-9: 14
9. THOUGHTS THAT YOU WOULD BE BETTER OFF DEAD, OR OF HURTING YOURSELF: SEVERAL DAYS
10. IF YOU CHECKED OFF ANY PROBLEMS, HOW DIFFICULT HAVE THESE PROBLEMS MADE IT FOR YOU TO DO YOUR WORK, TAKE CARE OF THINGS AT HOME, OR GET ALONG WITH OTHER PEOPLE: SOMEWHAT DIFFICULT
6. FEELING BAD ABOUT YOURSELF - OR THAT YOU ARE A FAILURE OR HAVE LET YOURSELF OR YOUR FAMILY DOWN: SEVERAL DAYS

## 2025-01-17 ASSESSMENT — LIFESTYLE VARIABLES
HOW MANY STANDARD DRINKS CONTAINING ALCOHOL DO YOU HAVE ON A TYPICAL DAY: PATIENT DOES NOT DRINK
HOW OFTEN DO YOU HAVE A DRINK CONTAINING ALCOHOL: NEVER

## 2025-01-17 NOTE — PROGRESS NOTES
Carlos Steiner presents today for   Chief Complaint   Patient presents with    Medicare AWV           \"Have you been to the ER, urgent care clinic since your last visit?  Hospitalized since your last visit?\"    NO    “Have you seen or consulted any other health care providers outside of [facility] since your last visit?”    NO         .

## 2025-01-17 NOTE — PROGRESS NOTES
[facility] OF [facility]:  1/23/2025, MRN: [id number]      Carlos Steiner is a 39 y.o. male and presents to clinic for a Medicare AWV and Foot Pain (Left foot; intermittent pain; achy pain)      Subjective:   The patient is a 39-year-old male with history of HTN, type 2 DM, lumbar facet arthropathy, weight loss surgery, obesity, left transmetatarsal amputation secondary to a diabetic foot infection, and HLD.     1. Type 2 DM: Present for yrs.  I last saw him in August 2023 at which time he was diet controlled.  He underwent weight loss surgery and had dramatic improvement in his chronic kidney disease and diabetes. He has not seen doctors since then. His A1C was 5 when checked in October.     2. HTN and CKD Stage 4: BP today:  209/107. Taking: no rx for months.  Last apt with Nephrology: a year ago. Previously treated with zetia 10mg daily and lipitor 40mg daily for CVD risk reduction. Previously treated with hydralazine 50mg tid and coreg 25mg bid.     Latest Reference Range & Units 06/08/22 10:52 10/20/22 14:45 02/23/23 00:00 06/15/23 00:00 10/24/24 13:48   BUN,BUNPL 7.0 - 18 MG/DL 32 (H) 34 (H) 33 (H)  33 (H) 38 (H) 71 (H)   Creatinine 0.6 - 1.3 MG/DL 1.78 (H) 1.98 (H) 1.84 (H)  1.84 (H) 2.34 (H) 4.61 (H)   Bun/Cre 12 - 20   18 17 18  18 16 15   Anion Gap 3.0 - 18 mmol/L     7   Est, Glom Filt Rate >60 ml/min/1.73m2 50 (L) 44 (L) 48 (L)  48 (L) 36 (L) 16 (L)   (H): Data is abnormally high  (L): Data is abnormally low    3. Depression: Today he reports: severe sx. In between apts, he stopped going to [employer]. + but moved out of his home. +Staying at an extended stay hotel. Working. He is here today with his brother and wife.         Patient Active Problem List    Diagnosis Date Noted    Intestinal malabsorption 03/22/2022    S/P laparoscopic sleeve gastrectomy 03/22/2022    Sleep apnea     Hx of diabetic foot ulcer     Acid reflux     Anemia 01/05/2020    Snoring 01/05/2020    Erectile dysfunction

## 2025-01-23 ASSESSMENT — ENCOUNTER SYMPTOMS
BACK PAIN: 0
SORE THROAT: 0
SHORTNESS OF BREATH: 0
ABDOMINAL PAIN: 0
EYE PAIN: 0

## 2025-01-24 NOTE — ACP (ADVANCE CARE PLANNING)
Advance Care Planning     General Advance Care Planning (ACP) Conversation    Date of Conversation: 1/17/25    Conducted with: Patient with Decision Making Capacity    Healthcare Decision Maker:  Today we documented Decision Maker(s) consistent with Legal Next of Kin hierarchy.    Content/Action Overview:  Has NO ACP documents-Information provided    He gave me permission to discuss his care completely/freely with his brother and wife. They are to be his POAs. He will need to complete his advance directive formally though.    Length of Voluntary ACP Conversation in minutes:  <16 minutes (Non-Billable)    Didi Zamora MD

## 2025-01-29 ENCOUNTER — CARE COORDINATION (OUTPATIENT)
Facility: CLINIC | Age: 40
End: 2025-01-29

## 2025-01-29 DIAGNOSIS — I10 ESSENTIAL HYPERTENSION: Primary | ICD-10-CM

## 2025-01-29 DIAGNOSIS — G47.30 SLEEP APNEA, UNSPECIFIED TYPE: ICD-10-CM

## 2025-01-29 DIAGNOSIS — E11.21 CONTROLLED TYPE 2 DIABETES MELLITUS WITH DIABETIC NEPHROPATHY, WITHOUT LONG-TERM CURRENT USE OF INSULIN (HCC): ICD-10-CM

## 2025-01-29 DIAGNOSIS — H26.9 CATARACT OF BOTH EYES, UNSPECIFIED CATARACT TYPE: ICD-10-CM

## 2025-01-29 DIAGNOSIS — E78.5 HYPERLIPIDEMIA LDL GOAL <70: ICD-10-CM

## 2025-01-29 DIAGNOSIS — H35.033 HYPERTENSIVE RETINOPATHY OF BOTH EYES: ICD-10-CM

## 2025-01-29 DIAGNOSIS — D64.9 ANEMIA, UNSPECIFIED TYPE: ICD-10-CM

## 2025-01-29 PROCEDURE — 1111F DSCHRG MED/CURRENT MED MERGE: CPT | Performed by: INTERNAL MEDICINE

## 2025-01-29 RX ORDER — EZETIMIBE 10 MG/1
10 TABLET ORAL DAILY
Qty: 90 TABLET | Refills: 2 | Status: SHIPPED | OUTPATIENT
Start: 2025-01-29

## 2025-01-29 RX ORDER — CARVEDILOL 25 MG/1
TABLET ORAL
Qty: 60 TABLET | Refills: 0 | OUTPATIENT
Start: 2025-01-29

## 2025-01-29 RX ORDER — ATORVASTATIN CALCIUM 40 MG/1
TABLET, FILM COATED ORAL
Qty: 30 TABLET | Refills: 0 | OUTPATIENT
Start: 2025-01-29

## 2025-01-29 RX ORDER — ATORVASTATIN CALCIUM 40 MG/1
40 TABLET, FILM COATED ORAL DAILY
Qty: 90 TABLET | Refills: 3 | Status: SHIPPED | OUTPATIENT
Start: 2025-01-29

## 2025-01-29 RX ORDER — NIFEDIPINE 60 MG/1
60 TABLET, EXTENDED RELEASE ORAL DAILY
COMMUNITY
Start: 2025-01-28

## 2025-01-29 RX ORDER — SEVELAMER CARBONATE 800 MG/1
800 TABLET, FILM COATED ORAL
COMMUNITY
Start: 2025-01-27

## 2025-01-29 RX ORDER — SODIUM BICARBONATE 650 MG/1
1300 TABLET ORAL DAILY
COMMUNITY
Start: 2025-01-28

## 2025-01-29 RX ORDER — CARVEDILOL 25 MG/1
25 TABLET ORAL 2 TIMES DAILY WITH MEALS
Qty: 180 TABLET | Refills: 2 | Status: SHIPPED | OUTPATIENT
Start: 2025-01-29

## 2025-01-29 RX ORDER — EZETIMIBE 10 MG/1
TABLET ORAL
Qty: 30 TABLET | Refills: 0 | OUTPATIENT
Start: 2025-01-29

## 2025-01-29 NOTE — TELEPHONE ENCOUNTER
Last OV: 01/17/25  Next OV: 02/17/25  Last RX: 08/21/24-ezetimibe                 06/24/24- atorvastatin                  03/06/24- carvedilol      Pt request refill.    JAMES Gonzalez LPN

## 2025-01-29 NOTE — CARE COORDINATION
Care Transitions Note    Initial Call - Call within 2 business days of discharge: Yes    Patient Current Location:  [place]    Care Transition Nurse contacted the patient by telephone to perform post hospital discharge assessment, verified name and  as identifiers. Provided introduction to self, and explanation of the Care Transition Nurse role.     Patient: Carlos Steiner    Patient : 1985   MRN: [id number]    Reason for Admission: Hypertensive Emergency and CEE on CKD 2/2 Diabetic Nephropathy  Admission Date: 25  Discharge Date: 25 RURS: No data recorded      Was this an external facility discharge? Yes. Discharge Date: 25. Facility Name: [facility]    Additional needs identified to be addressed with provider   No needs identified             Method of communication with provider: none.    Patients top risk factors for readmission: medical condition-CKD/diabetic nephropathy, HTN    Interventions to address risk factors:   Review of patient management of conditions/medications: Patient expresses high level of motivation to change his diet, exercise habits, and to be compliant with medications. He has reviewed a lot of information on HTN and CKD, red flags, and declines the need for further education. He plans to monitor his sodium intake. He will begin monitoring BP at home and CTN recommended checking TID and keeping a log. He called [facility] and left a  to schedule an appt. He is tentatively scheduled with general surgery for PD catheter placement, but he's hopeful he will be able to improve kidney function with lifestyle modifications.     Care Summary Note: Patient reports he's doing a lot better. Denies any CEE symptoms. UO has been normal and color is light yellow. Denies any edema or SOB. He prefers to hold off on PD catheter placement. He wants to work on diet, exercise, and medication compliance. He has a hx of CEE and notes results improved

## 2025-01-29 NOTE — TELEPHONE ENCOUNTER
Refill request via fax     Medication:   ezetimibe (ZETIA) 10 MG tablet   atorvastatin (LIPITOR) 40 MG tablet   carvedilol (COREG) 25 MG tablet     Pharmacy:   [address]         PCP: Didi Zamora MD    LAST OFFICE VISIT: 01/17/2025       Future Appointments   Date Time Provider Department Center   1/31/2025 11:00 AM Didi Zamora MD [facility] ECC DEP   2/17/2025  2:20 PM Didi Zamora MD [facility] DEP

## 2025-01-31 ENCOUNTER — OFFICE VISIT (OUTPATIENT)
Facility: CLINIC | Age: 40
End: 2025-01-31

## 2025-01-31 VITALS
WEIGHT: 255 LBS | HEIGHT: 67 IN | TEMPERATURE: 98.5 F | SYSTOLIC BLOOD PRESSURE: 101 MMHG | DIASTOLIC BLOOD PRESSURE: 55 MMHG | HEART RATE: 79 BPM | OXYGEN SATURATION: 96 % | RESPIRATION RATE: 18 BRPM | BODY MASS INDEX: 40.02 KG/M2

## 2025-01-31 DIAGNOSIS — E66.01 MORBID (SEVERE) OBESITY DUE TO EXCESS CALORIES: ICD-10-CM

## 2025-01-31 DIAGNOSIS — E11.22 TYPE 2 DIABETES MELLITUS WITH CHRONIC KIDNEY DISEASE, WITHOUT LONG-TERM CURRENT USE OF INSULIN, UNSPECIFIED CKD STAGE (HCC): Primary | ICD-10-CM

## 2025-01-31 DIAGNOSIS — Z09 HOSPITAL DISCHARGE FOLLOW-UP: ICD-10-CM

## 2025-01-31 DIAGNOSIS — F32.A DEPRESSION, UNSPECIFIED DEPRESSION TYPE: ICD-10-CM

## 2025-01-31 DIAGNOSIS — I10 ESSENTIAL (PRIMARY) HYPERTENSION: ICD-10-CM

## 2025-01-31 DIAGNOSIS — K59.09 CHRONIC CONSTIPATION: ICD-10-CM

## 2025-01-31 RX ORDER — HYDRALAZINE HYDROCHLORIDE 100 MG/1
100 TABLET, FILM COATED ORAL 3 TIMES DAILY
Qty: 90 TABLET | Refills: 11
Start: 2025-01-31

## 2025-02-02 RX ORDER — FLUTICASONE PROPIONATE 50 MCG
SPRAY, SUSPENSION (ML) NASAL
Qty: 16 G | Refills: 5 | Status: SHIPPED | OUTPATIENT
Start: 2025-02-02

## 2025-02-05 ENCOUNTER — CARE COORDINATION (OUTPATIENT)
Facility: CLINIC | Age: 40
End: 2025-02-05

## 2025-02-05 NOTE — CARE COORDINATION
Care Transitions Note    Follow Up Call     Patient Current Location:  Home:  [address]  [place] [address]    [facility] Care Coordinator contacted the patient by telephone. Verified name and  as identifiers.    Additional needs identified to be addressed with provider   No needs identified                 Method of communication with provider: none.    Care Summary Note:   Spoke with patient.  Patient states he is doing fine.  Patient followed up with pcp and nephrology on 25. Has follow up with nephrology in March.  Patient reports b/p 125/75.  Patient has no questions or concerns.        Advance Care Planning:   Does patient have an Advance Directive: not on file; education provided - patient would like paperwork mailed to you .    Medication Review:  No changes since last call.     Remote Patient Monitoring:  Offered patient enrollment in the Remote Patient Monitoring (RPM) program for in-home monitoring: Patient is not eligible for RPM program because: insurance coverage.  Predicted PHP at this time    Assessments:  No changes since last call    Follow Up Appointment:   Reviewed upcoming appointment(s).  Future Appointments         Provider Specialty Dept Phone    2025 2:20 PM Didi Zamora MD Internal Medicine [phone]    3/28/2025 11:20 AM Didi Zamora MD Internal Medicine [phone]            LPN Care Coordinator provided contact information.  Plan for follow-up call in 6-10 days based on severity of symptoms and risk factors.  Plan for next call:  assess for any needs      Marie Pickett LPN

## 2025-02-07 NOTE — PROGRESS NOTES
Carlos Steiner presents today for No chief complaint on file.    BP Recheck 101/55      \"Have you been to the ER, urgent care clinic since your last visit?  Hospitalized since your last visit?\"    YES - When: approximately 3 days ago.  Where and Why: renal failure [facility].    “Have you seen or consulted any other health care providers outside of [facility] since your last visit?”    NO            
TRANSITIONS OF CARE FACE-TO-FACE VISIT  [facility]:  2/6/2025, MRN: [id number]  Chief Complaint   Patient presents with    Follow-up         Carlosthierry Steiner is a 39 y.o. male and presents to clinic after recent hospitalization.      Subjective:   Discharged from: [facility]    Summary of hospitalization problems/diagnoses: The patient is a 39-year-old male with history of HTN, type 2 DM, lumbar facet arthropathy, weight loss surgery, obesity, left transmetatarsal amputation secondary to a diabetic foot infection, and HLD.     1. CKD Stage 5: He sees Nephrology later today. He was  traumatized regarding the possibility of needing HD because his mom was on HD x 12 yrs. S/p hospitalization for CEE and HTN. BP is 101/55. On hydralazine 100 mg 3 times daily, nifedipine 60 mg daily, and carvedilol 25 mg twice daily.  He was told that he may require peritoneal dialysis in the future.  He is also on Renvela 800 mg 3 times daily and sodium bicarbonate 603 mg 2 tablets daily.    Hospital Dates:   Admission Date: 1/18/25  Discharge Date: 1/28/25    FIONA phone apt: 1/29/25    2. Depression: \"It's been fine.\" He met with a psychiatrist in the hospital. He was placed on zoloft in the hospital. He started to feel weak like he had no motivation on zoloft. As a result, it was d/c. +.  He identifies as Temple.  He is living at home with his wife.  No suicidal ideation.  He feels he has a good support system.    3. Chronic Constipation: Previously on metamucil. Improved with this rx. He has bouts off/on since having weight loss surgery.       Current Outpatient Medications on File Prior to Visit   Medication Sig Dispense Refill    sevelamer (RENVELA) 800 MG tablet Take 1 tablet by mouth 3 times daily (with meals)      sodium bicarbonate 650 MG tablet Take 2 tablets by mouth daily      NIFEdipine (PROCARDIA XL) 60 MG extended release tablet Take 1 tablet by mouth daily      atorvastatin (LIPITOR) 
fluticasone (FLONASE) 50 MCG/ACT nasal spray Use 2 spray(s) in each nostril once daily 16 g 5    hydrALAZINE (APRESOLINE) 50 MG tablet TAKE 1 TABLET BY MOUTH THREE TIMES DAILY (Patient taking differently: Take 2 tablets by mouth 3 times daily) 90 tablet 0    Lancets MISC USE TO CHECK BLOOD SUGAR TWICE DAILY (Patient not taking: Reported on 2025)       No current facility-administered medications for this visit.       Allergies   Allergen Reactions    Amlodipine Swelling     Had swelling in his legs and feet - states was painful to walk       Past Medical History:   Diagnosis Date    Acid reflux     avoids food triggers    CEE (acute kidney injury) ([facility]) 2021    Anemia 2020    Cataract of both eyes 10/8/2018    Chronic kidney disease     stage 3    Diabetes mellitus ([facility])     Dx'd , HgA1c 9.4% 3/2021    Hx of diabetic foot ulcer     2021    Hyperlipidemia LDL goal <70 2018    Hypertension     Dx'd     Morbid obesity     Morbid obesity with BMI of 45.0-49.9, adult     Proliferative diabetic retinopathy of both eyes ([facility]) 2018    Sleep apnea     New diagnosis, waiting on CPAP    Stage 3b chronic kidney disease ([facility]) 2021    Dr Curtis, nephrology       Past Surgical History:   Procedure Laterality Date    LAP, DIAMOND RESTRICT PROC, LONGITUDINAL GASTRECTOMY  03/10/2022    Dr San    ORTHOPEDIC SURGERY Left     amputation of toes x 5    ORTHOPEDIC SURGERY Left     foot surgery x 3    ORTHOPEDIC SURGERY Left     infection foot, debridement    TOE AMPUTATION  2018       Family History   Problem Relation Age of Onset    Kidney Disease Mother         ESRD - HD       Social History     Tobacco Use    Smoking status: Former     Current packs/day: 0.00     Types: Cigarettes     Quit date: 2008     Years since quittin.0     Passive exposure: Past    Smokeless tobacco: Never   Substance Use Topics    Alcohol use: No       ROS   Review of Systems      Objective     BP (!) 101/55

## 2025-02-10 RX ORDER — SODIUM BICARBONATE 650 MG/1
1300 TABLET ORAL DAILY
Qty: 180 TABLET | Refills: 2 | Status: SHIPPED | OUTPATIENT
Start: 2025-02-10

## 2025-02-10 NOTE — TELEPHONE ENCOUNTER
Refill request via fax     Medication: sodium bicarbonate 650 MG tablet     Pharmacy:   [facility] [address] [place] [address] [id number] [facility]       PCP: Didi Zamora MD    LAST OFFICE VISIT: 1/31/25      Future Appointments   Date Time Provider Department Center   2/17/2025  2:20 PM Didi Zamora MD [facility] DEP   3/28/2025 11:20 AM Didi Zamora MD [facility] DEP

## 2025-02-12 ENCOUNTER — CARE COORDINATION (OUTPATIENT)
Facility: CLINIC | Age: 40
End: 2025-02-12

## 2025-02-12 NOTE — CARE COORDINATION
Care Transitions Note    Final Call     Patient Current Location:  [place]    Care Transition Nurse contacted the patient by telephone. Verified name and  as identifiers.    Patient closed (insurance status-predicted PHP) from the Care Transitions program on 25.  Patient/family verbalizes confidence in the ability to self-manage at this time..      Advance Care Planning:   Does patient have an Advance Directive: reviewed during previous call, see note. .    Handoff:   Patient was not referred to the ACM team due to patient not eligible for ACM services due to insurance.      Care Summary Note: Patient reports doing well. Placed on referral for a kidney transplant and currently does not need dialysis. Patient has been watching what he eats and reducing his stress levels.    Assessments:  Care Transitions Subsequent and Final Call    Subsequent and Final Calls  Do you have any ongoing symptoms?: No  Do you have any questions related to your medications?: No  Do you currently have any active services?: No  Do you have any needs or concerns that I can assist you with?: No  Identified Barriers: None  Care Transitions Interventions  No Identified Needs  Other Interventions:              Upcoming Appointments:    Future Appointments         Provider Specialty Dept Phone    2025 2:20 PM Didi Zamora MD Internal Medicine [phone]    3/28/2025 11:20 AM Didi Zamora MD Internal Medicine [phone]            Patient has agreed to contact primary care provider and/or specialist for any further questions, concerns, or needs.    Gretta Limon RN

## 2025-02-18 RX ORDER — NIFEDIPINE 60 MG/1
60 TABLET, EXTENDED RELEASE ORAL DAILY
Qty: 90 TABLET | Refills: 3 | Status: SHIPPED | OUTPATIENT
Start: 2025-02-18

## 2025-03-12 ENCOUNTER — TELEPHONE (OUTPATIENT)
Facility: CLINIC | Age: 40
End: 2025-03-12

## 2025-03-12 DIAGNOSIS — I10 ESSENTIAL (PRIMARY) HYPERTENSION: ICD-10-CM

## 2025-03-12 RX ORDER — HYDRALAZINE HYDROCHLORIDE 100 MG/1
100 TABLET, FILM COATED ORAL 3 TIMES DAILY
Qty: 90 TABLET | Refills: 11 | Status: SHIPPED | OUTPATIENT
Start: 2025-03-12

## 2025-03-12 NOTE — TELEPHONE ENCOUNTER
Refill request via fax     Medication: hydrALAZINE (APRESOLINE) 100 MG tablet    Quantity: 90  Pharmacy: [address] [phone] [facility] [phone] [[id number]]   Last Fill: 01/31/2025    PCP: Didi Zamora MD    LAST OFFICE VISIT: 01/31/2025       Future Appointments   Date Time Provider Department Center   3/28/2025 11:20 AM Didi Zamora MD [facility] ECC DEP         No difficulties

## 2025-03-28 ENCOUNTER — PATIENT MESSAGE (OUTPATIENT)
Facility: CLINIC | Age: 40
End: 2025-03-28

## 2025-04-02 DIAGNOSIS — N18.5 CKD (CHRONIC KIDNEY DISEASE) STAGE 5, GFR LESS THAN 15 ML/MIN (HCC): ICD-10-CM

## 2025-04-03 RX ORDER — SEVELAMER CARBONATE 800 MG/1
1 TABLET, FILM COATED ORAL
Qty: 90 TABLET | Refills: 3 | Status: SHIPPED | OUTPATIENT
Start: 2025-04-03

## 2025-04-14 ENCOUNTER — OFFICE VISIT (OUTPATIENT)
Facility: CLINIC | Age: 40
End: 2025-04-14
Payer: MEDICARE

## 2025-04-14 VITALS
WEIGHT: 241 LBS | HEART RATE: 82 BPM | DIASTOLIC BLOOD PRESSURE: 68 MMHG | RESPIRATION RATE: 18 BRPM | BODY MASS INDEX: 38.73 KG/M2 | TEMPERATURE: 97.6 F | OXYGEN SATURATION: 97 % | HEIGHT: 66 IN | SYSTOLIC BLOOD PRESSURE: 127 MMHG

## 2025-04-14 DIAGNOSIS — F32.A DEPRESSION, UNSPECIFIED DEPRESSION TYPE: ICD-10-CM

## 2025-04-14 DIAGNOSIS — E11.22 TYPE 2 DIABETES MELLITUS WITH CHRONIC KIDNEY DISEASE, WITHOUT LONG-TERM CURRENT USE OF INSULIN, UNSPECIFIED CKD STAGE (HCC): ICD-10-CM

## 2025-04-14 DIAGNOSIS — N18.5 CKD (CHRONIC KIDNEY DISEASE) STAGE 5, GFR LESS THAN 15 ML/MIN (HCC): ICD-10-CM

## 2025-04-14 DIAGNOSIS — I10 ESSENTIAL (PRIMARY) HYPERTENSION: ICD-10-CM

## 2025-04-14 DIAGNOSIS — N52.9 ERECTILE DYSFUNCTION, UNSPECIFIED ERECTILE DYSFUNCTION TYPE: Primary | ICD-10-CM

## 2025-04-14 PROCEDURE — 99214 OFFICE O/P EST MOD 30 MIN: CPT | Performed by: INTERNAL MEDICINE

## 2025-04-14 PROCEDURE — 1036F TOBACCO NON-USER: CPT | Performed by: INTERNAL MEDICINE

## 2025-04-14 PROCEDURE — 3078F DIAST BP <80 MM HG: CPT | Performed by: INTERNAL MEDICINE

## 2025-04-14 PROCEDURE — 3074F SYST BP LT 130 MM HG: CPT | Performed by: INTERNAL MEDICINE

## 2025-04-14 PROCEDURE — G8427 DOCREV CUR MEDS BY ELIG CLIN: HCPCS | Performed by: INTERNAL MEDICINE

## 2025-04-14 PROCEDURE — 3046F HEMOGLOBIN A1C LEVEL >9.0%: CPT | Performed by: INTERNAL MEDICINE

## 2025-04-14 PROCEDURE — 2022F DILAT RTA XM EVC RTNOPTHY: CPT | Performed by: INTERNAL MEDICINE

## 2025-04-14 PROCEDURE — G8417 CALC BMI ABV UP PARAM F/U: HCPCS | Performed by: INTERNAL MEDICINE

## 2025-04-14 RX ORDER — SILDENAFIL 25 MG/1
TABLET, FILM COATED ORAL
Qty: 30 TABLET | Refills: 5 | Status: SHIPPED | OUTPATIENT
Start: 2025-04-14

## 2025-04-14 RX ORDER — FOLIC ACID 1 MG/1
1000 TABLET ORAL DAILY
COMMUNITY
Start: 2025-03-20

## 2025-04-14 RX ORDER — BUMETANIDE 1 MG/1
1 TABLET ORAL DAILY
COMMUNITY
Start: 2025-04-10

## 2025-04-14 NOTE — PROGRESS NOTES
Carlos Steiner presents today for   Chief Complaint   Patient presents with    Diabetes     8 week follow up           \"Have you been to the ER, urgent care clinic since your last visit?  Hospitalized since your last visit?\"    NO    “Have you seen or consulted any other health care providers outside of [facility] since your last visit?”    NO         
Date    Acid reflux     avoids food triggers    CEE (acute kidney injury) 2021    Anemia 2020    Cataract of both eyes 10/8/2018    Chronic kidney disease     stage 3    Diabetes mellitus (HCC)     Dx'd , HgA1c 9.4% 3/2021    Hx of diabetic foot ulcer     2021    Hyperlipidemia LDL goal <70 2018    Hypertension     Dx'd 2018    Morbid obesity     Morbid obesity with BMI of 45.0-49.9, adult     Proliferative diabetic retinopathy of both eyes (HCC) 2018    Sleep apnea     New diagnosis, waiting on CPAP    Stage 3b chronic kidney disease (HCC) 2021    Dr Curtis, nephrology       Past Surgical History:   Procedure Laterality Date    LAP, DIAMOND RESTRICT PROC, LONGITUDINAL GASTRECTOMY  03/10/2022    Dr San    ORTHOPEDIC SURGERY Left     amputation of toes x 5    ORTHOPEDIC SURGERY Left     foot surgery x 3    ORTHOPEDIC SURGERY Left     infection foot, debridement    TOE AMPUTATION  2018       Family History   Problem Relation Age of Onset    Kidney Disease Mother         ESRD - HD       Social History     Tobacco Use    Smoking status: Former     Current packs/day: 0.00     Types: Cigarettes     Quit date: 2008     Years since quittin.2     Passive exposure: Past    Smokeless tobacco: Never   Substance Use Topics    Alcohol use: No       ROS   Review of Systems   Constitutional:  Negative for fever.   HENT:  Negative for ear pain and sore throat.    Eyes:  Negative for pain.   Respiratory:  Negative for cough and shortness of breath.    Cardiovascular:  Negative for chest pain.   Gastrointestinal:  Negative for abdominal pain, anal bleeding and blood in stool.   Genitourinary:  Negative for dysuria and hematuria.   Musculoskeletal:  Negative for arthralgias and myalgias.   Skin:  Negative for rash.   Neurological:  Negative for headaches.   Hematological:  Does not bruise/bleed easily.   Psychiatric/Behavioral:  Negative for suicidal ideas.          Objective     /68 (BP

## 2025-06-11 ENCOUNTER — HOSPITAL ENCOUNTER (OUTPATIENT)
Facility: HOSPITAL | Age: 40
Setting detail: SPECIMEN
Discharge: HOME | End: 2025-06-14
Payer: MEDICARE

## 2025-06-11 DIAGNOSIS — E11.22 TYPE 2 DIABETES MELLITUS WITH CHRONIC KIDNEY DISEASE, WITHOUT LONG-TERM CURRENT USE OF INSULIN, UNSPECIFIED CKD STAGE (HCC): ICD-10-CM

## 2025-06-11 DIAGNOSIS — N52.9 ERECTILE DYSFUNCTION, UNSPECIFIED ERECTILE DYSFUNCTION TYPE: ICD-10-CM

## 2025-06-11 LAB
EST. AVERAGE GLUCOSE BLD GHB EST-MCNC: 89 MG/DL
HBA1C MFR BLD: 4.7 % (ref 4.2–5.6)

## 2025-06-11 PROCEDURE — 36415 COLL VENOUS BLD VENIPUNCTURE: CPT

## 2025-06-11 PROCEDURE — 83036 HEMOGLOBIN GLYCOSYLATED A1C: CPT

## 2025-06-11 PROCEDURE — 84403 ASSAY OF TOTAL TESTOSTERONE: CPT

## 2025-06-11 PROCEDURE — 84402 ASSAY OF FREE TESTOSTERONE: CPT

## 2025-06-14 LAB
TESTOST FREE SERPL-MCNC: 13.7 PG/ML (ref 6.8–21.5)
TESTOST SERPL-MCNC: 611 NG/DL (ref 264–916)

## 2025-06-16 ENCOUNTER — OFFICE VISIT (OUTPATIENT)
Facility: CLINIC | Age: 40
End: 2025-06-16
Payer: MEDICARE

## 2025-06-16 VITALS
BODY MASS INDEX: 36.88 KG/M2 | DIASTOLIC BLOOD PRESSURE: 63 MMHG | SYSTOLIC BLOOD PRESSURE: 111 MMHG | HEIGHT: 67 IN | WEIGHT: 235 LBS | HEART RATE: 89 BPM | RESPIRATION RATE: 97 BRPM | TEMPERATURE: 98.6 F

## 2025-06-16 DIAGNOSIS — I10 ESSENTIAL HYPERTENSION: Primary | ICD-10-CM

## 2025-06-16 DIAGNOSIS — E11.22 TYPE 2 DIABETES MELLITUS WITH CHRONIC KIDNEY DISEASE, WITHOUT LONG-TERM CURRENT USE OF INSULIN, UNSPECIFIED CKD STAGE (HCC): ICD-10-CM

## 2025-06-16 DIAGNOSIS — N18.5 CKD (CHRONIC KIDNEY DISEASE) STAGE 5, GFR LESS THAN 15 ML/MIN (HCC): ICD-10-CM

## 2025-06-16 PROCEDURE — 1036F TOBACCO NON-USER: CPT | Performed by: INTERNAL MEDICINE

## 2025-06-16 PROCEDURE — 3074F SYST BP LT 130 MM HG: CPT | Performed by: INTERNAL MEDICINE

## 2025-06-16 PROCEDURE — 3044F HG A1C LEVEL LT 7.0%: CPT | Performed by: INTERNAL MEDICINE

## 2025-06-16 PROCEDURE — G8417 CALC BMI ABV UP PARAM F/U: HCPCS | Performed by: INTERNAL MEDICINE

## 2025-06-16 PROCEDURE — 2022F DILAT RTA XM EVC RTNOPTHY: CPT | Performed by: INTERNAL MEDICINE

## 2025-06-16 PROCEDURE — 99214 OFFICE O/P EST MOD 30 MIN: CPT | Performed by: INTERNAL MEDICINE

## 2025-06-16 PROCEDURE — G8427 DOCREV CUR MEDS BY ELIG CLIN: HCPCS | Performed by: INTERNAL MEDICINE

## 2025-06-16 PROCEDURE — 3078F DIAST BP <80 MM HG: CPT | Performed by: INTERNAL MEDICINE

## 2025-06-16 ASSESSMENT — ENCOUNTER SYMPTOMS
SORE THROAT: 0
ANAL BLEEDING: 0
BLOOD IN STOOL: 0
EYE PAIN: 0
SHORTNESS OF BREATH: 0
ABDOMINAL PAIN: 0
COUGH: 0

## 2025-06-16 NOTE — PROGRESS NOTES
[facility]:  6/16/2025, MRN: [id number]      Carlos Steiner is a 39 y.o. male and presents to clinic for Diabetes      Subjective:   The patient is a 39-year-old male with history of HTN, type 2 DM, lumbar facet arthropathy, weight loss surgery, ED, depression, CKD stage 5, obesity, left transmetatarsal amputation secondary to a diabetic foot infection, and HLD.     1.  Type 2 diabetes: Diet controlled.  His most recent lab work shows that his A1c is 4.7.  Since his last appointment he was diagnosed with a left midfoot ulcer.  Followed by podiatry.     Podiatry Note 5/13/25: \"Reviewed diagnosis and current/proposed treatment plan. Discussed the above etiology and pathology. Discussed all treatment options. Wound has with minor pinhole. Dressed with betadine and bandaid. Continue home care. Continue offloading pads. Prescription for diabetic shoes provided. In addition to review of medical records, appropriate lab work, ancillary imaging studies independently interpreted as appropriate with results discussed with the Pt. Will f/u in 3 months.\"     Latest Reference Range & Units 06/11/25 07:30   Hemoglobin A1C 4.2 - 5.6 % 4.7     2.  Hypertension: Blood pressure is 111/63.:  Bumex 1 mg daily, hydralazine 100 mg 3 times daily, nifedipine 60 mg daily, and carvedilol 25 mg twice daily.    4/4/25 Stress Test: Small, mild grade, reversible distal inferior wall defect.  Findings suspicious for a small amount of myocardium at ischemic risk.   No evidence of prior infarct.  Moderately dilated left ventricular size with low normal/borderline reduced global systolic function.  EF ~ 53%. No evidence of transient ischemic left ventricular enlargement. This study is low risk     4/11/25 Heart Scan: The patient has a total calcium (Agatston) score of 114. This is considered moderately elevated.     3.  CKD stage V: Followed by nephrology.  On blood pressure medications as mentioned above and Renvela 800 mg 3 times

## 2025-06-16 NOTE — PROGRESS NOTES
Carlos Steiner presents today for   Chief Complaint   Patient presents with    Diabetes           \"Have you been to the ER, urgent care clinic since your last visit?  Hospitalized since your last visit?\"    No      “Have you seen or consulted any other health care providers outside of [facility] since your last visit?”    no

## 2025-06-17 ENCOUNTER — PATIENT MESSAGE (OUTPATIENT)
Facility: CLINIC | Age: 40
End: 2025-06-17

## 2025-06-20 ENCOUNTER — TELEPHONE (OUTPATIENT)
Facility: CLINIC | Age: 40
End: 2025-06-20

## 2025-06-26 NOTE — TELEPHONE ENCOUNTER
I spoke with the patient letting him know that his forms were faxed over on 6/11. The confirmation faxed has been scanned into his chart.

## 2025-06-30 DIAGNOSIS — E11.22 TYPE 2 DIABETES MELLITUS WITH CHRONIC KIDNEY DISEASE, WITHOUT LONG-TERM CURRENT USE OF INSULIN, UNSPECIFIED CKD STAGE (HCC): Primary | ICD-10-CM

## 2025-07-28 DIAGNOSIS — N18.5 CKD (CHRONIC KIDNEY DISEASE) STAGE 5, GFR LESS THAN 15 ML/MIN (HCC): ICD-10-CM

## 2025-07-28 RX ORDER — SEVELAMER CARBONATE 800 MG/1
1 TABLET, FILM COATED ORAL
Qty: 90 TABLET | Refills: 5 | Status: SHIPPED | OUTPATIENT
Start: 2025-07-28

## 2025-07-28 NOTE — PROGRESS NOTES
Pharmacy Progress Note - Medication Access     S/O: Mr. Carlos Steiner is a 39 y.o. referred by Didi Zamora MD for Medication Access Assistance and Device Education.      Medication:  Ozempic ---- Mounjaro    Referral dated 6/30/25:  Please assist with getting him on GLP1 agonist based rx and with education regarding how to inject this rx.    Today:  Pt's PA for Ozempic was denied.  Mounjaro on formulary and he was previously on Trulicity and is familiar with the injection device.  Advised Mounjaro is the same device.    [facility] Moun[facility]o [facility]: Carlos Steiner ([place]: [id number])  [id number]     Attached recent Didi Zamora MD note as well as labs from 2021 showing A1c > 6.5% to meet criteria.    Mounjaro Education:   - Single-use pen for each dose and is discarded after each use.   - Used once weekly on the same day each week.  - Injection sites - abdomen (at least 2\" from belly button), thigh, outer arm  - Store in the refrigerator and allow to come to room temperature before injection (about 10-15 minutes).   - Discussed proper injection technique   Choose appropriate injection site and clean the area with alcohol wipe, allowing it to dry completely.    Make sure the pen is in the locked position and remove the cap by pulling it straight off.    Place the clear base firmly against the skin at appropriate injection site.  Turn the pen to the unlocked position when ready to inject.  Press and hold the button to begin injection; you will hear a click once the injection starts.    Discard entire pen once injection is completed.   - Educated on potential side effects and that medication may decrease appetite. Overeating or eating greasy foods/red meats can increase risk of GI side effects. Monitor for lumps/swelling/hoarseness in throat and severe abdominal pain.   - Discussed missed doses - take it as soon as remembered as long as there are at least 3 days until

## 2025-07-29 ENCOUNTER — PHARMACY VISIT (OUTPATIENT)
Facility: CLINIC | Age: 40
End: 2025-07-29

## 2025-07-29 DIAGNOSIS — E11.9 CONTROLLED TYPE 2 DIABETES MELLITUS WITHOUT COMPLICATION, WITHOUT LONG-TERM CURRENT USE OF INSULIN (HCC): Primary | ICD-10-CM

## 2025-08-19 DIAGNOSIS — E11.9 CONTROLLED TYPE 2 DIABETES MELLITUS WITHOUT COMPLICATION, WITHOUT LONG-TERM CURRENT USE OF INSULIN (HCC): ICD-10-CM

## 2025-08-22 RX ORDER — TIRZEPATIDE 2.5 MG/.5ML
INJECTION, SOLUTION SUBCUTANEOUS
Qty: 2 ML | Refills: 3 | Status: SHIPPED | OUTPATIENT
Start: 2025-08-22

## (undated) DEVICE — SYRINGE,TOOMEY,IRRIGATION,70CC,STERILE: Brand: MEDLINE

## (undated) DEVICE — TROCAR ENDOSCP L100MM DIA15MM BLDELSS STBL SL ENDOPATH XCEL

## (undated) DEVICE — SEALANT TISS 10 CC FOR HUM FIBRIN VISTASEAL

## (undated) DEVICE — Device

## (undated) DEVICE — VISIGI 3D®  CALIBRATION SYSTEM  SIZE 36FR STD W/ BULB: Brand: BOEHRINGER® VISIGI 3D™ SLEEVE GASTRECTOMY CALIBRATION SYSTEM, SIZE 36FR W/BULB

## (undated) DEVICE — SHEAR HARMONIC 5MMX45CM -- ACE 7+

## (undated) DEVICE — TROCAR RMFG CANN S-SLV 5X100MM --

## (undated) DEVICE — VISUALIZATION SYSTEM: Brand: CLEARIFY

## (undated) DEVICE — SOLUTION SURG PREP 26 CC PURPREP

## (undated) DEVICE — STAPLER SKIN L440MM 32MM LNG 12 FIRING B FRM PWR + GRIPPING

## (undated) DEVICE — GLOVE ORANGE PI 8   MSG9080

## (undated) DEVICE — TUBING, SUCTION, 1/4" X 12', STRAIGHT: Brand: MEDLINE

## (undated) DEVICE — GARMENT,MEDLINE,DVT,INT,CALF,MED, GEN2: Brand: MEDLINE

## (undated) DEVICE — BARIATRIC: Brand: MEDLINE INDUSTRIES, INC.

## (undated) DEVICE — TROCAR ENDOSCP BLDELSS 12X100 MM W/ HNDL STBL SL OPT TIP

## (undated) DEVICE — RESERVOIR,SUCTION,100CC,SILICONE: Brand: MEDLINE

## (undated) DEVICE — APPLIER CLP L SHFT DIA12MM 20 ROT MULT LIGACLP

## (undated) DEVICE — [HIGH FLOW INSUFFLATOR,  DO NOT USE IF PACKAGE IS DAMAGED,  KEEP DRY,  KEEP AWAY FROM SUNLIGHT,  PROTECT FROM HEAT AND RADIOACTIVE SOURCES.]: Brand: PNEUMOSURE

## (undated) DEVICE — FORCEPS BX OVL CUP SERR DISP CAP L 240CM RAD JAW 4

## (undated) DEVICE — NEEDLE INSUF L150MM DIA2MM DISP FOR PNEUMOPERI ENDOPATH

## (undated) DEVICE — SOLUTION LACTATED RINGERS INJECTION USP

## (undated) DEVICE — SOL IRRIGATION INJ NACL 0.9% 500ML BTL

## (undated) DEVICE — SUTURE STRATAFIX 2-0 PDS PLUS 30CM VIO SXPP1A431

## (undated) DEVICE — AGENT HEMSTAT W6XL9IN OXIDIZED REGENERATED CELOS ABSRB FOR

## (undated) DEVICE — AIR SHEET,LAT,COMFORT GLIDE, BLEND 40X80: Brand: MEDLINE

## (undated) DEVICE — SUT MONOCRYL PLUS UD 4-0 --

## (undated) DEVICE — STRIP SKIN CLSR W1XL5IN NYL REINF CURAD

## (undated) DEVICE — STAPLER SKIN LN REINF 60 MM ECHELON ENDOPATH

## (undated) DEVICE — APPLICATOR LAP 35 CM 2 RIGID VISTASEAL

## (undated) DEVICE — TROCAR RMFG ENDOPATH 12X100M --

## (undated) DEVICE — RELOAD STPL L60MM H2.3-4.2MM VERY THCK TISS BLK 6 ROW

## (undated) DEVICE — SUTURE ETHIB EXCL BR GRN TAPR PT 2-0 30 X563H X563H

## (undated) DEVICE — TROCAR LAP L100MM DIA5MM BLDELSS W/ STBL SL ENDOPATH XCEL

## (undated) DEVICE — APPLICATOR VISTASEAL DUAL

## (undated) DEVICE — RELOAD STPL H4.1X2MM DIA60MM THCK TISS GRN 6 ROW PWR GST B

## (undated) DEVICE — GLOVE ORANGE PI 7 1/2   MSG9075

## (undated) DEVICE — ENDOCUT SCISSOR TIP, DISPOSABLE: Brand: RENEW

## (undated) DEVICE — DISPOSABLE SUCTION/IRRIGATOR TUBE SET WITH TIP: Brand: AHTO

## (undated) DEVICE — SUTURE ETHLN SZ 3-0 L30IN NONABSORBABLE BLK FSL L30MM 3/8 1671H

## (undated) DEVICE — SPONGE DRAIN NONWOVEN 4X4IN -- 2/PK

## (undated) DEVICE — SUTURE PDS II SZ 0 L27IN ABSRB VLT L26MM CT-2 1/2 CIR Z334H